# Patient Record
Sex: FEMALE | Race: WHITE | NOT HISPANIC OR LATINO | Employment: FULL TIME | URBAN - NONMETROPOLITAN AREA
[De-identification: names, ages, dates, MRNs, and addresses within clinical notes are randomized per-mention and may not be internally consistent; named-entity substitution may affect disease eponyms.]

---

## 2017-07-11 ENCOUNTER — OFFICE VISIT (OUTPATIENT)
Dept: FAMILY MEDICINE | Facility: CLINIC | Age: 27
End: 2017-07-11
Payer: COMMERCIAL

## 2017-07-11 VITALS
HEIGHT: 67 IN | BODY MASS INDEX: 20.56 KG/M2 | RESPIRATION RATE: 18 BRPM | DIASTOLIC BLOOD PRESSURE: 66 MMHG | TEMPERATURE: 97.7 F | SYSTOLIC BLOOD PRESSURE: 117 MMHG | HEART RATE: 80 BPM | WEIGHT: 131 LBS

## 2017-07-11 DIAGNOSIS — N76.0 BACTERIAL VAGINITIS: ICD-10-CM

## 2017-07-11 DIAGNOSIS — N89.8 VAGINAL DISCHARGE: Primary | ICD-10-CM

## 2017-07-11 DIAGNOSIS — B96.89 BACTERIAL VAGINITIS: ICD-10-CM

## 2017-07-11 LAB
MICRO REPORT STATUS: ABNORMAL
SPECIMEN SOURCE: ABNORMAL
WET PREP SPEC: ABNORMAL

## 2017-07-11 PROCEDURE — 87491 CHLMYD TRACH DNA AMP PROBE: CPT | Performed by: FAMILY MEDICINE

## 2017-07-11 PROCEDURE — 99213 OFFICE O/P EST LOW 20 MIN: CPT | Performed by: FAMILY MEDICINE

## 2017-07-11 PROCEDURE — 87210 SMEAR WET MOUNT SALINE/INK: CPT | Performed by: FAMILY MEDICINE

## 2017-07-11 PROCEDURE — 87591 N.GONORRHOEAE DNA AMP PROB: CPT | Performed by: FAMILY MEDICINE

## 2017-07-11 RX ORDER — METRONIDAZOLE 500 MG/1
500 TABLET ORAL 2 TIMES DAILY
Qty: 14 TABLET | Refills: 0 | Status: SHIPPED | OUTPATIENT
Start: 2017-07-11 | End: 2017-07-17

## 2017-07-11 NOTE — LETTER
[address] [address]  [phone]        July 13, 2017    Jackie Quispe  [address]  [facility] [place] [address]            Dear Jackie,    The results of you recent STD screening for chlamydia and gonorrhoeae were all normal.    If you have any questions or concerns, please call me or my nurse at [phone].    Sincerely,        Tru Savage MD

## 2017-07-11 NOTE — NURSING NOTE
"Chief Complaint   Patient presents with     Vaginal Problem       Initial /66 (Cuff Size: Adult Regular)  Pulse 80  Temp 97.7  F (36.5  C) (Tympanic)  Resp 18  Ht 5' 6.5\" (1.689 m)  Wt 131 lb (59.4 kg)  Breastfeeding? No  BMI 20.83 kg/m2 Estimated body mass index is 20.83 kg/(m^2) as calculated from the following:    Height as of this encounter: 5' 6.5\" (1.689 m).    Weight as of this encounter: 131 lb (59.4 kg).  Medication Reconciliation: complete    Health Maintenance that is potentially due pending provider review:  NONE    n/a    "

## 2017-07-11 NOTE — PATIENT INSTRUCTIONS
Bacterial Vaginosis    You have a vaginal infection called bacterial vaginosis (BV). Both good and bad bacteria are present in a healthy vagina. BV occurs when these bacteria get out of balance. The number of bad bacteria increase. And the number of good bacteria decrease.  BV may or may not cause symptoms. If symptoms do occur, they can include:    Thin, gray, milky-white, or sometimes green discharge    Unpleasant odor or  fishy  smell    Itching, burning, or pain in or around the vagina  It is not known what causes BV, but certain factors can make the problem more likely. This can include:    Douching    Having sex with a new partner    Having sex with more than one partner  BV will sometimes go away on its own. But treatment is usually recommended. This is because untreated BV can increase the risk of more serious health problems such as:    Pelvic inflammatory disease (PID)     delivery (giving birth to a baby early if you re pregnant)    HIV and certain other sexually transmitted diseases (STDs)    Infection after surgery on the reproductive organs  Home care  General care    BV is most often treated with medicines called antibiotics. These may be given as pills or as a vaginal cream. If antibiotics are prescribed, be sure to use them exactly as directed. Also, be sure to complete all of the medicine, even if your symptoms go away.    Avoid douching or having sex during treatment.    If you have sex with a female partner, ask your healthcare provider if she should also be treated.  Prevention    Limit or avoid douching.    Avoid having sex. If you do have sex, then take steps to lower your risk:    Use condoms when having sex.    Limit the number of partners you have sex with.  Follow-up care  Follow up with your healthcare provider, or as advised.  When to seek medical advice  Call your healthcare provider right away if:    You have a fever of 100.4 F (38 C) or higher, or as directed by your  provider.    Your symptoms worsen, or they don t go away within a few days of starting treatment.    You have new pain in the lower belly or pelvic region.    You have side effects that bother you or a reaction to the pills or cream you re prescribed.    You or any partners you have sex with have new symptoms, such as a rash, joint pain, or sores.  Date Last Reviewed: 7/30/2015 2000-2017 [employer]. [address] [place] [address], [place] [address]. All rights reserved. This information is not intended as a substitute for professional medical care. Always follow your healthcare professional's instructions.          Vaginal Infection: Bacterial Vaginosis  Both good and bad bacteria are present in a healthy vagina. Bacterial vaginosis (BV) occurs when these bacteria get out of balance. The numbers of good bacteria decrease. This allows the numbers of bad bacteria to increase and cause BV. In most cases, BV is not a serious problem. This sheet tells you more about BV and how it can be treated.  Causes of Bacterial Vaginosis  The cause of BV is not clear. Douching may lead to it. Having sex with a new partner or more than 1 partner makes it more likely.  Symptoms of Bacterial Vaginosis  Symptoms of BV vary for each woman. Some women have few symptoms or none at all. If symptoms are present, they can include:    Thin, milky white or gray discharge    Unpleasant  fishy  odor    Irritation, itching, and burning at opening of vagina.    Burning or irritation with sex or urination  Diagnosing Bacterial Vaginosis  Your health care provider will ask about your symptoms and health history. He or she will also perform a pelvic exam. This is an exam of your vagina and cervix. A sample of vaginal fluid or discharge may be taken. This sample is checked for signs of BV.  Treating Bacterial Vaginosis  BV is often treated with antibiotics. They may be given in oral pill form or as a vaginal cream. To use these  medications:    Be sure to take all of your medication, even if your symptoms go away.    If you re taking antibiotic pills, do not drink alcohol until you re finished with all of your medication.    If you re using vaginal cream, apply it as directed. Be aware that the cream may make condoms and diaphragms less effective.    Call your health care provider if symptoms do not go away within 4 days of starting treatment. Also call if you have a reaction to the medication.  Why Treatment Matters  Even if you have no symptoms or your symptoms are mild, BV should be treated. Untreated BV can lead to health problems such as:    Increased risk of  delivery if you re pregnant.    Increased risk of complications after surgery on the reproductive organs.    Possible increased risk of pelvic inflammatory disease (PID).     8031-3943 [employer] [address] [place] [address]. All rights reserved. This information is not intended as a substitute for professional medical care. Always follow your healthcare professional's instructions.        Patient Education    Butylparaben, Cetyl Alcohol, Methylparaben, Propylene Glycol, Propylparaben, Sodium Lauryl Sulfate, Stearyl Alcohol, Water Topical emulsion, Metronidazole Topical gel    Metronidazole Oral capsule    Metronidazole Oral tablet    Metronidazole Oral tablet, extended-release    Metronidazole Solution for injection    Metronidazole Topical cream    Metronidazole Topical gel    Metronidazole Topical lotion    Metronidazole Vaginal gel  Metronidazole Oral tablet  What is this medicine?  METRONIDAZOLE (me troe NI da zole) is an antiinfective. It is used to treat certain kinds of bacterial and protozoal infections. It will not work for colds, flu, or other viral infections.  This medicine may be used for other purposes; ask your health care provider or pharmacist if you have questions.  What should I tell my health care provider before I take  this medicine?  They need to know if you have any of these conditions:    anemia or other blood disorders    disease of the nervous system    fungal or yeast infection    if you drink alcohol containing drinks    liver disease    seizures    an unusual or allergic reaction to metronidazole, or other medicines, foods, dyes, or preservatives    pregnant or trying to get pregnant    breast-feeding  How should I use this medicine?  Take this medicine by mouth with a full glass of water. Follow the directions on the prescription label. Take your medicine at regular intervals. Do not take your medicine more often than directed. Take all of your medicine as directed even if you think you are better. Do not skip doses or stop your medicine early.  Talk to your pediatrician regarding the use of this medicine in children. Special care may be needed.  Overdosage: If you think you have taken too much of this medicine contact a poison control center or emergency room at once.  NOTE: This medicine is only for you. Do not share this medicine with others.  What if I miss a dose?  If you miss a dose, take it as soon as you can. If it is almost time for your next dose, take only that dose. Do not take double or extra doses.  What may interact with this medicine?  Do not take this medicine with any of the following medications:    alcohol or any product that contains alcohol    amprenavir oral solution    cisapride    disulfiram    dofetilide    dronedarone    paclitaxel injection    pimozide    ritonavir oral solution    sertraline oral solution    sulfamethoxazole-trimethoprim injection    thioridazine    ziprasidone  This medicine may also interact with the following medications:    birth control pills    cimetidine    lithium    other medicines that prolong the QT interval (cause an abnormal heart rhythm)    phenobarbital    phenytoin    warfarin  This list may not describe all possible interactions. Give your health care provider  a list of all the medicines, herbs, non-prescription drugs, or dietary supplements you use. Also tell them if you smoke, drink alcohol, or use illegal drugs. Some items may interact with your medicine.  What should I watch for while using this medicine?  Tell your doctor or health care professional if your symptoms do not improve or if they get worse.  You may get drowsy or dizzy. Do not drive, use machinery, or do anything that needs mental alertness until you know how this medicine affects you. Do not stand or sit up quickly, especially if you are an older patient. This reduces the risk of dizzy or fainting spells.  Avoid alcoholic drinks while you are taking this medicine and for three days afterward. Alcohol may make you feel dizzy, sick, or flushed.  If you are being treated for a sexually transmitted disease, avoid sexual contact until you have finished your treatment. Your sexual partner may also need treatment.  What side effects may I notice from receiving this medicine?  Side effects that you should report to your doctor or health care professional as soon as possible:    allergic reactions like skin rash or hives, swelling of the face, lips, or tongue    confusion, clumsiness    difficulty speaking    discolored or sore mouth    dizziness    fever, infection    numbness, tingling, pain or weakness in the hands or feet    trouble passing urine or change in the amount of urine    redness, blistering, peeling or loosening of the skin, including inside the mouth    seizures    unusually weak or tired    vaginal irritation, dryness, or discharge  Side effects that usually do not require medical attention (report to your doctor or health care professional if they continue or are bothersome):    diarrhea    headache    irritability    metallic taste    nausea    stomach pain or cramps    trouble sleeping  This list may not describe all possible side effects. Call your doctor for medical advice about side  effects. You may report side effects to FDA at 1-946-RZG-7276.  Where should I keep my medicine?  Keep out of the reach of children.  Store at room temperature below 25 degrees C (77 degrees F). Protect from light. Keep container tightly closed. Throw away any unused medicine after the expiration date.  NOTE:This sheet is a summary. It may not cover all possible information. If you have questions about this medicine, talk to your doctor, pharmacist, or health care provider. Copyright  2016 [employer]

## 2017-07-11 NOTE — MR AVS SNAPSHOT
After Visit Summary   2017    Jackie Quispe    MRN: [id number]           Patient Information     Date Of Birth          1990        Visit Information        Provider Department      2017 12:00 PM Tru Savage MD [facility]        Today's Diagnoses     Vaginal discharge    -  1    Bacterial vaginitis          Care Instructions      Bacterial Vaginosis    You have a vaginal infection called bacterial vaginosis (BV). Both good and bad bacteria are present in a healthy vagina. BV occurs when these bacteria get out of balance. The number of bad bacteria increase. And the number of good bacteria decrease.  BV may or may not cause symptoms. If symptoms do occur, they can include:    Thin, gray, milky-white, or sometimes green discharge    Unpleasant odor or  fishy  smell    Itching, burning, or pain in or around the vagina  It is not known what causes BV, but certain factors can make the problem more likely. This can include:    Douching    Having sex with a new partner    Having sex with more than one partner  BV will sometimes go away on its own. But treatment is usually recommended. This is because untreated BV can increase the risk of more serious health problems such as:    Pelvic inflammatory disease (PID)     delivery (giving birth to a baby early if you re pregnant)    HIV and certain other sexually transmitted diseases (STDs)    Infection after surgery on the reproductive organs  Home care  General care    BV is most often treated with medicines called antibiotics. These may be given as pills or as a vaginal cream. If antibiotics are prescribed, be sure to use them exactly as directed. Also, be sure to complete all of the medicine, even if your symptoms go away.    Avoid douching or having sex during treatment.    If you have sex with a female partner, ask your healthcare provider if she should also be treated.  Prevention    Limit or avoid  douching.    Avoid having sex. If you do have sex, then take steps to lower your risk:    Use condoms when having sex.    Limit the number of partners you have sex with.  Follow-up care  Follow up with your healthcare provider, or as advised.  When to seek medical advice  Call your healthcare provider right away if:    You have a fever of 100.4 F (38 C) or higher, or as directed by your provider.    Your symptoms worsen, or they don t go away within a few days of starting treatment.    You have new pain in the lower belly or pelvic region.    You have side effects that bother you or a reaction to the pills or cream you re prescribed.    You or any partners you have sex with have new symptoms, such as a rash, joint pain, or sores.  Date Last Reviewed: 7/30/2015 2000-2017 [employer]. [address] [place] [address]. All rights reserved. This information is not intended as a substitute for professional medical care. Always follow your healthcare professional's instructions.          Vaginal Infection: Bacterial Vaginosis  Both good and bad bacteria are present in a healthy vagina. Bacterial vaginosis (BV) occurs when these bacteria get out of balance. The numbers of good bacteria decrease. This allows the numbers of bad bacteria to increase and cause BV. In most cases, BV is not a serious problem. This sheet tells you more about BV and how it can be treated.  Causes of Bacterial Vaginosis  The cause of BV is not clear. Douching may lead to it. Having sex with a new partner or more than 1 partner makes it more likely.  Symptoms of Bacterial Vaginosis  Symptoms of BV vary for each woman. Some women have few symptoms or none at all. If symptoms are present, they can include:    Thin, milky white or gray discharge    Unpleasant  fishy  odor    Irritation, itching, and burning at opening of vagina.    Burning or irritation with sex or urination  Diagnosing Bacterial Vaginosis  Your health care  provider will ask about your symptoms and health history. He or she will also perform a pelvic exam. This is an exam of your vagina and cervix. A sample of vaginal fluid or discharge may be taken. This sample is checked for signs of BV.  Treating Bacterial Vaginosis  BV is often treated with antibiotics. They may be given in oral pill form or as a vaginal cream. To use these medications:    Be sure to take all of your medication, even if your symptoms go away.    If you re taking antibiotic pills, do not drink alcohol until you re finished with all of your medication.    If you re using vaginal cream, apply it as directed. Be aware that the cream may make condoms and diaphragms less effective.    Call your health care provider if symptoms do not go away within 4 days of starting treatment. Also call if you have a reaction to the medication.  Why Treatment Matters  Even if you have no symptoms or your symptoms are mild, BV should be treated. Untreated BV can lead to health problems such as:    Increased risk of  delivery if you re pregnant.    Increased risk of complications after surgery on the reproductive organs.    Possible increased risk of pelvic inflammatory disease (PID).     1389-4667 [employer]. [address] [place] [address]. All rights reserved. This information is not intended as a substitute for professional medical care. Always follow your healthcare professional's instructions.        Patient Education    Butylparaben, Cetyl Alcohol, Methylparaben, Propylene Glycol, Propylparaben, Sodium Lauryl Sulfate, Stearyl Alcohol, Water Topical emulsion, Metronidazole Topical gel    Metronidazole Oral capsule    Metronidazole Oral tablet    Metronidazole Oral tablet, extended-release    Metronidazole Solution for injection    Metronidazole Topical cream    Metronidazole Topical gel    Metronidazole Topical lotion    Metronidazole Vaginal gel  Metronidazole Oral tablet  What is  this medicine?  METRONIDAZOLE (ankit manrique) is an antiinfective. It is used to treat certain kinds of bacterial and protozoal infections. It will not work for colds, flu, or other viral infections.  This medicine may be used for other purposes; ask your health care provider or pharmacist if you have questions.  What should I tell my health care provider before I take this medicine?  They need to know if you have any of these conditions:    anemia or other blood disorders    disease of the nervous system    fungal or yeast infection    if you drink alcohol containing drinks    liver disease    seizures    an unusual or allergic reaction to metronidazole, or other medicines, foods, dyes, or preservatives    pregnant or trying to get pregnant    breast-feeding  How should I use this medicine?  Take this medicine by mouth with a full glass of water. Follow the directions on the prescription label. Take your medicine at regular intervals. Do not take your medicine more often than directed. Take all of your medicine as directed even if you think you are better. Do not skip doses or stop your medicine early.  Talk to your pediatrician regarding the use of this medicine in children. Special care may be needed.  Overdosage: If you think you have taken too much of this medicine contact a poison control center or emergency room at once.  NOTE: This medicine is only for you. Do not share this medicine with others.  What if I miss a dose?  If you miss a dose, take it as soon as you can. If it is almost time for your next dose, take only that dose. Do not take double or extra doses.  What may interact with this medicine?  Do not take this medicine with any of the following medications:    alcohol or any product that contains alcohol    amprenavir oral solution    cisapride    disulfiram    dofetilide    dronedarone    paclitaxel injection    pimozide    ritonavir oral solution    sertraline oral  solution    sulfamethoxazole-trimethoprim injection    thioridazine    ziprasidone  This medicine may also interact with the following medications:    birth control pills    cimetidine    lithium    other medicines that prolong the QT interval (cause an abnormal heart rhythm)    phenobarbital    phenytoin    warfarin  This list may not describe all possible interactions. Give your health care provider a list of all the medicines, herbs, non-prescription drugs, or dietary supplements you use. Also tell them if you smoke, drink alcohol, or use illegal drugs. Some items may interact with your medicine.  What should I watch for while using this medicine?  Tell your doctor or health care professional if your symptoms do not improve or if they get worse.  You may get drowsy or dizzy. Do not drive, use machinery, or do anything that needs mental alertness until you know how this medicine affects you. Do not stand or sit up quickly, especially if you are an older patient. This reduces the risk of dizzy or fainting spells.  Avoid alcoholic drinks while you are taking this medicine and for three days afterward. Alcohol may make you feel dizzy, sick, or flushed.  If you are being treated for a sexually transmitted disease, avoid sexual contact until you have finished your treatment. Your sexual partner may also need treatment.  What side effects may I notice from receiving this medicine?  Side effects that you should report to your doctor or health care professional as soon as possible:    allergic reactions like skin rash or hives, swelling of the face, lips, or tongue    confusion, clumsiness    difficulty speaking    discolored or sore mouth    dizziness    fever, infection    numbness, tingling, pain or weakness in the hands or feet    trouble passing urine or change in the amount of urine    redness, blistering, peeling or loosening of the skin, including inside the mouth    seizures    unusually weak or tired    vaginal  irritation, dryness, or discharge  Side effects that usually do not require medical attention (report to your doctor or health care professional if they continue or are bothersome):    diarrhea    headache    irritability    metallic taste    nausea    stomach pain or cramps    trouble sleeping  This list may not describe all possible side effects. Call your doctor for medical advice about side effects. You may report side effects to FDA at 0-719-AME-8157.  Where should I keep my medicine?  Keep out of the reach of children.  Store at room temperature below 25 degrees C (77 degrees F). Protect from light. Keep container tightly closed. Throw away any unused medicine after the expiration date.  NOTE:This sheet is a summary. It may not cover all possible information. If you have questions about this medicine, talk to your doctor, pharmacist, or health care provider. Copyright  2016 [employer]                Follow-ups after your visit        Who to contact     If you have questions or need follow up information about today's clinic visit or your schedule please contact [facility] directly at [phone].  Normal or non-critical lab and imaging results will be communicated to you by [employer], letter or phone within 4 business days after the clinic has received the results. If you do not hear from us within 7 days, please contact the clinic through [employer] or phone. If you have a critical or abnormal lab result, we will notify you by phone as soon as possible.  Submit refill requests through [employer] or call your pharmacy and they will forward the refill request to us. Please allow 3 business days for your refill to be completed.          Additional Information About Your Visit        [employer] Information     [employer] gives you secure access to your electronic health record. If you see a primary care provider, you can also send messages to your care team and make appointments. If you have questions,  "please call your primary care clinic.  If you do not have a primary care provider, please call [phone] and they will assist you.        Care EveryWhere ID     This is your Care EveryWhere ID. This could be used by other organizations to access your [facility] medical records  [id number]        Your Vitals Were     Pulse Temperature Respirations Height Breastfeeding? BMI (Body Mass Index)    80 97.7  F (36.5  C) (Tympanic) 18 5' 6.5\" (1.689 m) No 20.83 kg/m2       Blood Pressure from Last 3 Encounters:   07/11/17 117/66   09/13/16 118/66   08/02/16 114/66    Weight from Last 3 Encounters:   07/11/17 131 lb (59.4 kg)   09/13/16 132 lb (59.9 kg)   08/02/16 130 lb 12.8 oz (59.3 kg)              We Performed the Following     Chlamydia trachomatis PCR     Neisseria gonorrhoeae PCR     Wet prep          Today's Medication Changes          These changes are accurate as of: 7/11/17 12:33 PM.  If you have any questions, ask your nurse or doctor.               Start taking these medicines.        Dose/Directions    metroNIDAZOLE 500 MG tablet   Commonly known as:  FLAGYL   Used for:  Bacterial vaginitis   Started by:  Tru Savage MD        Dose:  500 mg   Take 1 tablet (500 mg) by mouth 2 times daily   Quantity:  14 tablet   Refills:  0         Stop taking these medicines if you haven't already. Please contact your care team if you have questions.     estradiol 1 MG tablet   Commonly known as:  ESTRACE   Stopped by:  Tru Savage MD           nicotine 14 MG/24HR 24 hr patch   Commonly known as:  NICODERM CQ   Stopped by:  Tru Savage MD           nicotine 21 MG/24HR 24 hr patch   Commonly known as:  NICODERM CQ   Stopped by:  Tru Savage MD           nicotine 7 MG/24HR 24 hr patch   Commonly known as:  NICODERM CQ   Stopped by:  Tru Savage MD                Where to get your medicines      [facility] [address] [place] [address] - [address]  [address] " , [facility] [address]     Phone:  [phone]     metroNIDAZOLE 500 MG tablet                Primary Care Provider Office Phone # Fax #    Tammy Beth [phone]       [facility] [address]  [facility] [address]        Equal Access to Services     DILMA QUINONES : Hadii aad ku hadasho Soomaali, waaxda luqadaha, qaybta kaalmada adeegyada, waxay idiin hayaan ademarino vargasdomingojazmin laamandeep lee. So [facility] [phone].    ATENCIÓN: Si habla español, tiene a dang disposición servicios gratuitos de asistencia lingüística. [facility] [phone].    We comply with applicable federal civil rights laws and [place] laws. We do not discriminate on the basis of race, color, national origin, age, disability sex, sexual orientation or gender identity.            Thank you!     Thank you for choosing [facility]  for your care. Our goal is always to provide you with excellent care. Hearing back from our patients is one way we can continue to improve our services. Please take a few minutes to complete the written survey that you may receive in the mail after your visit with us. Thank you!             Your Updated Medication List - Protect others around you: Learn how to safely use, store and throw away your medicines at www.disposemymeds.org.          This list is accurate as of: 7/11/17 12:33 PM.  Always use your most recent med list.                   Brand Name Dispense Instructions for use Diagnosis    levonorgestrel 20 MCG/24HR IUD    MIRENA    1 each    1 each (20 mcg) by Intrauterine route once 7/8/2016        metroNIDAZOLE 500 MG tablet    FLAGYL    14 tablet    Take 1 tablet (500 mg) by mouth 2 times daily    Bacterial vaginitis

## 2017-07-11 NOTE — PROGRESS NOTES
SUBJECTIVE:                                                    Jackie Quispe is a 27 year old female who presents to clinic today for the following health issues:      Vaginal Symptoms      Duration: about a month    Description  vaginal discharge - creamy and pelvic pain    Intensity:  moderate    Accompanying signs and symptoms (fever/dysuria/abdominal or back pain): None    History  Sexually active: yes, single partner, contraception - IUD  Possibility of pregnancy: No  Recent antibiotic use: no     Precipitating or alleviating factors: None    Therapies tried and outcome: none   Outcome:     LMP 06/05/2017, sexually active, no history of STD      Problem list and histories reviewed & adjusted, as indicated.  Additional history: as documented    Patient Active Problem List   Diagnosis     Smoker     Acne      mirena IUD     Dysfunctional uterine bleeding     Past Surgical History:   Procedure Laterality Date     MOUTH SURGERY      wisdom teeth       Social History   Substance Use Topics     Smoking status: Current Every Day Smoker     Packs/day: 0.50     Types: Cigarettes     Smokeless tobacco: Never Used      Comment: smoking  10 cig/day     Alcohol use Yes      Comment: Occas- quit with pregnacny     Family History   Problem Relation Age of Onset     Alcohol/Drug Maternal Grandmother      alcohol     Hypertension Paternal Grandfather      CANCER Paternal Grandfather      HEART DISEASE Paternal Grandfather      CANCER Maternal Grandfather      lung     Alzheimer Disease Paternal Grandmother      Family History Negative Mother      Cardiovascular Father 54     MI     HEART DISEASE Father          Current Outpatient Prescriptions   Medication Sig Dispense Refill     levonorgestrel (MIRENA) 20 MCG/24HR IUD 1 each (20 mcg) by Intrauterine route once 7/8/2016 1 each 0     No Known Allergies  Recent Labs   Lab Test  09/13/16   1750   ALT  17   CR  0.61   GFRESTIMATED  >90  Non  GFR Calc    "  GFRESTBLACK  >90   GFR Calc     POTASSIUM  3.9   TSH  1.22      BP Readings from Last 3 Encounters:   07/11/17 117/66   09/13/16 118/66   08/02/16 114/66    Wt Readings from Last 3 Encounters:   07/11/17 131 lb (59.4 kg)   09/13/16 132 lb (59.9 kg)   08/02/16 130 lb 12.8 oz (59.3 kg)                  Labs reviewed in EPIC    Reviewed and updated as needed this visit by clinical staff       Reviewed and updated as needed this visit by Provider         ROS:  Constitutional, HEENT, cardiovascular, pulmonary, gi and gu systems are negative, except as otherwise noted.    OBJECTIVE:     /66 (Cuff Size: Adult Regular)  Pulse 80  Temp 97.7  F (36.5  C) (Tympanic)  Resp 18  Ht 5' 6.5\" (1.689 m)  Wt 131 lb (59.4 kg)  Breastfeeding? No  BMI 20.83 kg/m2  Body mass index is 20.83 kg/(m^2).  GENERAL: healthy, alert and no distress  NECK: no adenopathy, no asymmetry, masses, or scars and thyroid normal to palpation  RESP: lungs clear to auscultation - no rales, rhonchi or wheezes  CV: regular rate and rhythm, normal S1 S2, no S3 or S4, no murmur, click or rub, no peripheral edema and peripheral pulses strong  ABDOMEN: soft, nontender, no hepatosplenomegaly, no masses and bowel sounds normal  MS: no gross musculoskeletal defects noted, no edema    Diagnostic Test Results:  Results for orders placed or performed in visit on 07/11/17 (from the past 24 hour(s))   Wet prep   Result Value Ref Range    Specimen Description Vagina     Wet Prep (A)      No Trichomonas seen  No yeast seen  Many Clue cells seen      Micro Report Status FINAL 07/11/2017        ASSESSMENT/PLAN:         ICD-10-CM    1. Vaginal discharge N89.8 Wet prep     Chlamydia trachomatis PCR     Neisseria gonorrhoeae PCR   2. Bacterial vaginitis N76.0 metroNIDAZOLE (FLAGYL) 500 MG tablet    B96.89        Symptoms likely secondary to bacterial vaginitis. Flagyl prescribed, common side effect discussed. Shared decision was made to do STD " screening as well. Written information provided. All questions answered.      MEDICATIONS:   Orders Placed This Encounter   Medications     metroNIDAZOLE (FLAGYL) 500 MG tablet     Sig: Take 1 tablet (500 mg) by mouth 2 times daily     Dispense:  14 tablet     Refill:  0       Patient Instructions       Bacterial Vaginosis    You have a vaginal infection called bacterial vaginosis (BV). Both good and bad bacteria are present in a healthy vagina. BV occurs when these bacteria get out of balance. The number of bad bacteria increase. And the number of good bacteria decrease.  BV may or may not cause symptoms. If symptoms do occur, they can include:    Thin, gray, milky-white, or sometimes green discharge    Unpleasant odor or  fishy  smell    Itching, burning, or pain in or around the vagina  It is not known what causes BV, but certain factors can make the problem more likely. This can include:    Douching    Having sex with a new partner    Having sex with more than one partner  BV will sometimes go away on its own. But treatment is usually recommended. This is because untreated BV can increase the risk of more serious health problems such as:    Pelvic inflammatory disease (PID)     delivery (giving birth to a baby early if you re pregnant)    HIV and certain other sexually transmitted diseases (STDs)    Infection after surgery on the reproductive organs  Home care  General care    BV is most often treated with medicines called antibiotics. These may be given as pills or as a vaginal cream. If antibiotics are prescribed, be sure to use them exactly as directed. Also, be sure to complete all of the medicine, even if your symptoms go away.    Avoid douching or having sex during treatment.    If you have sex with a female partner, ask your healthcare provider if she should also be treated.  Prevention    Limit or avoid douching.    Avoid having sex. If you do have sex, then take steps to lower your risk:    Use  condoms when having sex.    Limit the number of partners you have sex with.  Follow-up care  Follow up with your healthcare provider, or as advised.  When to seek medical advice  Call your healthcare provider right away if:    You have a fever of 100.4 F (38 C) or higher, or as directed by your provider.    Your symptoms worsen, or they don t go away within a few days of starting treatment.    You have new pain in the lower belly or pelvic region.    You have side effects that bother you or a reaction to the pills or cream you re prescribed.    You or any partners you have sex with have new symptoms, such as a rash, joint pain, or sores.  Date Last Reviewed: 7/30/2015 2000-2017 [employer]. [address] [place] [address], [place] [address]. All rights reserved. This information is not intended as a substitute for professional medical care. Always follow your healthcare professional's instructions.          Vaginal Infection: Bacterial Vaginosis  Both good and bad bacteria are present in a healthy vagina. Bacterial vaginosis (BV) occurs when these bacteria get out of balance. The numbers of good bacteria decrease. This allows the numbers of bad bacteria to increase and cause BV. In most cases, BV is not a serious problem. This sheet tells you more about BV and how it can be treated.  Causes of Bacterial Vaginosis  The cause of BV is not clear. Douching may lead to it. Having sex with a new partner or more than 1 partner makes it more likely.  Symptoms of Bacterial Vaginosis  Symptoms of BV vary for each woman. Some women have few symptoms or none at all. If symptoms are present, they can include:    Thin, milky white or gray discharge    Unpleasant  fishy  odor    Irritation, itching, and burning at opening of vagina.    Burning or irritation with sex or urination  Diagnosing Bacterial Vaginosis  Your health care provider will ask about your symptoms and health history. He or she will also perform a pelvic  exam. This is an exam of your vagina and cervix. A sample of vaginal fluid or discharge may be taken. This sample is checked for signs of BV.  Treating Bacterial Vaginosis  BV is often treated with antibiotics. They may be given in oral pill form or as a vaginal cream. To use these medications:    Be sure to take all of your medication, even if your symptoms go away.    If you re taking antibiotic pills, do not drink alcohol until you re finished with all of your medication.    If you re using vaginal cream, apply it as directed. Be aware that the cream may make condoms and diaphragms less effective.    Call your health care provider if symptoms do not go away within 4 days of starting treatment. Also call if you have a reaction to the medication.  Why Treatment Matters  Even if you have no symptoms or your symptoms are mild, BV should be treated. Untreated BV can lead to health problems such as:    Increased risk of  delivery if you re pregnant.    Increased risk of complications after surgery on the reproductive organs.    Possible increased risk of pelvic inflammatory disease (PID).     2950-4501 [employer]. [address] [place] [address]. All rights reserved. This information is not intended as a substitute for professional medical care. Always follow your healthcare professional's instructions.        Patient Education    Butylparaben, Cetyl Alcohol, Methylparaben, Propylene Glycol, Propylparaben, Sodium Lauryl Sulfate, Stearyl Alcohol, Water Topical emulsion, Metronidazole Topical gel    Metronidazole Oral capsule    Metronidazole Oral tablet    Metronidazole Oral tablet, extended-release    Metronidazole Solution for injection    Metronidazole Topical cream    Metronidazole Topical gel    Metronidazole Topical lotion    Metronidazole Vaginal gel  Metronidazole Oral tablet  What is this medicine?  METRONIDAZOLE (me amilcare NI da zole) is an antiinfective. It is used to treat  certain kinds of bacterial and protozoal infections. It will not work for colds, flu, or other viral infections.  This medicine may be used for other purposes; ask your health care provider or pharmacist if you have questions.  What should I tell my health care provider before I take this medicine?  They need to know if you have any of these conditions:    anemia or other blood disorders    disease of the nervous system    fungal or yeast infection    if you drink alcohol containing drinks    liver disease    seizures    an unusual or allergic reaction to metronidazole, or other medicines, foods, dyes, or preservatives    pregnant or trying to get pregnant    breast-feeding  How should I use this medicine?  Take this medicine by mouth with a full glass of water. Follow the directions on the prescription label. Take your medicine at regular intervals. Do not take your medicine more often than directed. Take all of your medicine as directed even if you think you are better. Do not skip doses or stop your medicine early.  Talk to your pediatrician regarding the use of this medicine in children. Special care may be needed.  Overdosage: If you think you have taken too much of this medicine contact a poison control center or emergency room at once.  NOTE: This medicine is only for you. Do not share this medicine with others.  What if I miss a dose?  If you miss a dose, take it as soon as you can. If it is almost time for your next dose, take only that dose. Do not take double or extra doses.  What may interact with this medicine?  Do not take this medicine with any of the following medications:    alcohol or any product that contains alcohol    amprenavir oral solution    cisapride    disulfiram    dofetilide    dronedarone    paclitaxel injection    pimozide    ritonavir oral solution    sertraline oral solution    sulfamethoxazole-trimethoprim injection    thioridazine    ziprasidone  This medicine may also interact  with the following medications:    birth control pills    cimetidine    lithium    other medicines that prolong the QT interval (cause an abnormal heart rhythm)    phenobarbital    phenytoin    warfarin  This list may not describe all possible interactions. Give your health care provider a list of all the medicines, herbs, non-prescription drugs, or dietary supplements you use. Also tell them if you smoke, drink alcohol, or use illegal drugs. Some items may interact with your medicine.  What should I watch for while using this medicine?  Tell your doctor or health care professional if your symptoms do not improve or if they get worse.  You may get drowsy or dizzy. Do not drive, use machinery, or do anything that needs mental alertness until you know how this medicine affects you. Do not stand or sit up quickly, especially if you are an older patient. This reduces the risk of dizzy or fainting spells.  Avoid alcoholic drinks while you are taking this medicine and for three days afterward. Alcohol may make you feel dizzy, sick, or flushed.  If you are being treated for a sexually transmitted disease, avoid sexual contact until you have finished your treatment. Your sexual partner may also need treatment.  What side effects may I notice from receiving this medicine?  Side effects that you should report to your doctor or health care professional as soon as possible:    allergic reactions like skin rash or hives, swelling of the face, lips, or tongue    confusion, clumsiness    difficulty speaking    discolored or sore mouth    dizziness    fever, infection    numbness, tingling, pain or weakness in the hands or feet    trouble passing urine or change in the amount of urine    redness, blistering, peeling or loosening of the skin, including inside the mouth    seizures    unusually weak or tired    vaginal irritation, dryness, or discharge  Side effects that usually do not require medical attention (report to your doctor  or health care professional if they continue or are bothersome):    diarrhea    headache    irritability    metallic taste    nausea    stomach pain or cramps    trouble sleeping  This list may not describe all possible side effects. Call your doctor for medical advice about side effects. You may report side effects to FDA at 3-003-YYB-3683.  Where should I keep my medicine?  Keep out of the reach of children.  Store at room temperature below 25 degrees C (77 degrees F). Protect from light. Keep container tightly closed. Throw away any unused medicine after the expiration date.  NOTE:This sheet is a summary. It may not cover all possible information. If you have questions about this medicine, talk to your doctor, pharmacist, or health care provider. Copyright  2016 [employer]            Tru Savage MD  [facility]

## 2017-07-12 LAB
C TRACH DNA SPEC QL NAA+PROBE: NORMAL
N GONORRHOEA DNA SPEC QL NAA+PROBE: NORMAL
SPECIMEN SOURCE: NORMAL
SPECIMEN SOURCE: NORMAL

## 2017-07-17 ENCOUNTER — OFFICE VISIT (OUTPATIENT)
Dept: FAMILY MEDICINE | Facility: CLINIC | Age: 27
End: 2017-07-17
Payer: COMMERCIAL

## 2017-07-17 VITALS
OXYGEN SATURATION: 99 % | BODY MASS INDEX: 20.67 KG/M2 | HEART RATE: 47 BPM | TEMPERATURE: 98.3 F | WEIGHT: 130 LBS | RESPIRATION RATE: 18 BRPM | SYSTOLIC BLOOD PRESSURE: 108 MMHG | DIASTOLIC BLOOD PRESSURE: 72 MMHG

## 2017-07-17 DIAGNOSIS — J02.0 ACUTE STREPTOCOCCAL PHARYNGITIS: ICD-10-CM

## 2017-07-17 DIAGNOSIS — R07.0 THROAT PAIN: Primary | ICD-10-CM

## 2017-07-17 LAB
DEPRECATED S PYO AG THROAT QL EIA: ABNORMAL
MICRO REPORT STATUS: ABNORMAL
SPECIMEN SOURCE: ABNORMAL

## 2017-07-17 PROCEDURE — 99213 OFFICE O/P EST LOW 20 MIN: CPT | Performed by: NURSE PRACTITIONER

## 2017-07-17 PROCEDURE — 87880 STREP A ASSAY W/OPTIC: CPT | Performed by: NURSE PRACTITIONER

## 2017-07-17 RX ORDER — PENICILLIN V POTASSIUM 500 MG/1
500 TABLET, FILM COATED ORAL 2 TIMES DAILY
Qty: 20 TABLET | Refills: 0 | Status: SHIPPED | OUTPATIENT
Start: 2017-07-17 | End: 2017-11-03

## 2017-07-17 NOTE — PROGRESS NOTES
SUBJECTIVE:                                                    Jackie Quispe is a 27 year old female who presents to clinic today for the following health issues:      ENT Symptoms             Symptoms: cc Present Absent Comment   Fever/Chills   x    Fatigue   x    Muscle Aches   x    Eye Irritation   x    Sneezing   x    Nasal Dean/Drg   x    Sinus Pressure/Pain   x    Loss of smell   x    Dental pain   x    Sore Throat  x     Swollen Glands  x     Ear Pain/Fullness  x     Cough  x     Wheeze   x    Chest Pain   x    Shortness of breath   x    Rash   x    Other         Symptom duration:  2 -3 days   Symptom severity:  Sore Throat   Treatments tried:  Tylenol, OTC cold and cough medication   Contacts:  Daughter has some symptoms of URI         Problem list and histories reviewed & adjusted, as indicated.  Additional history: as documented    Patient Active Problem List   Diagnosis     Smoker     Acne      mirena IUD     Dysfunctional uterine bleeding     Past Surgical History:   Procedure Laterality Date     MOUTH SURGERY      wisdom teeth       Social History   Substance Use Topics     Smoking status: Current Every Day Smoker     Packs/day: 0.50     Types: Cigarettes     Smokeless tobacco: Never Used      Comment: smoking  10 cig/day     Alcohol use Yes      Comment: Occas- quit with pregnacny     Family History   Problem Relation Age of Onset     Alcohol/Drug Maternal Grandmother      alcohol     Hypertension Paternal Grandfather      CANCER Paternal Grandfather      HEART DISEASE Paternal Grandfather      CANCER Maternal Grandfather      lung     Alzheimer Disease Paternal Grandmother      Family History Negative Mother      Cardiovascular Father 54     MI     HEART DISEASE Father            Reviewed and updated as needed this visit by clinical staff  Tobacco  Allergies  Med Hx  Surg Hx  Fam Hx  Soc Hx      Reviewed and updated as needed this visit by Provider         ROS:  Constitutional, HEENT,  cardiovascular, pulmonary, gi and gu systems are negative, except as otherwise noted.    OBJECTIVE:                                                    /72 (BP Location: Right arm, Patient Position: Chair, Cuff Size: Adult Regular)  Pulse (!) 47  Temp 98.3  F (36.8  C) (Tympanic)  Resp 18  Wt 130 lb (59 kg)  SpO2 99%  BMI 20.67 kg/m2  Body mass index is 20.67 kg/(m^2).  GENERAL APPEARANCE: healthy, alert and no distress  HENT: ear canals and TM's normal, nose and mouth without ulcers or lesions, tonsillar hypertrophy, tonsillar erythema and tonsillar exudate  NECK: no asymmetry, masses, or scars, thyroid normal to palpation and cervical adenopathy   RESP: lungs clear to auscultation - no rales, rhonchi or wheezes  CV: regular rates and rhythm, normal S1 S2, no S3 or S4 and no murmur, click or rub  ABDOMEN: soft, nontender, without hepatosplenomegaly or masses and bowel sounds normal  SKIN: no suspicious lesions or rashes  PSYCH: mentation appears normal and affect normal/bright    Diagnostic test results:  Diagnostic Test Results:  Results for orders placed or performed in visit on 07/17/17 (from the past 24 hour(s))   Strep, Rapid Screen   Result Value Ref Range    Specimen Description Throat     Rapid Strep A Screen (A)      POSITIVE: Group A Streptococcal antigen detected by immunoassay.    Micro Report Status FINAL 07/17/2017         ASSESSMENT/PLAN:                                                      Patient Instructions   1. Throat pain  - Strep, Rapid Screen    2. Acute streptococcal pharyngitis  - penicillin V potassium (VEETID) 500 MG tablet; Take 1 tablet (500 mg) by mouth 2 times daily  Dispense: 20 tablet; Refill: 0    Throw away tooth brush after treatment for 24 hours and then with completion of antibiotic   Symptomatic care as below         Pharyngitis: Strep (Confirmed)    You have had a positive test for strep throat. Strep throat is a contagious illness. It is spread by coughing,  kissing or by touching others after touching your mouth or nose. Symptoms include throat pain that is worse with swallowing, aching all over, headache, and fever. It is treated with antibiotic medicine. This should help you start to feel better in 1 to 2 days.  Home care    Rest at home. Drink plenty of fluids to you won't get dehydrated.    No work or school for the first 2 days of taking the antibiotics. After this time, you will not be contagious. You can then return to school or work if you are feeling better.     Take antibiotic medicine for the full 10 days, even if you feel better. This is very important to ensure the infection is treated. It is also important to prevent medicine-resistant germs from developing. If you were given an antibiotic shot, you don't need any more antibiotics.    You may use acetaminophen or ibuprofen to control pain or fever, unless another medicine was prescribed for this. Talk with your doctor before taking these medicines if you have chronic liver or kidney disease. Also talk with your doctor if you have had a stomach ulcer or GI bleeding.    Throat lozenges or sprays help reduce pain. Gargling with warm saltwater will also reduce throat pain. Dissolve 1/2 teaspoon of salt in 1 glass of warm water. This may be useful just before meals.     Soft foods are OK. Avoid salty or spicy foods.  Follow-up care  Follow up with your healthcare provider or our staff if you don't get better over the next week.  When to seek medical advice  Call your healthcare provider right away if any of these occur:    Fever of 100.4 F (38 C) or higher, or as directed by your healthcare provider    New or worsening ear pain, sinus pain, or headache    Painful lumps in the back of neck    Stiff neck    Lymph nodes getting larger or becoming soft in the middle    You can't swallow liquids or you can't open your mouth wide because of throat pain    Signs of dehydration. These include very dark urine or no  urine, sunken eyes, and dizziness.    Trouble breathing or noisy breathing    Muffled voice    Rash  Date Last Reviewed: 4/13/2015 2000-2017 [employer]. [address] [place] [address], [place] [address]. All rights reserved. This information is not intended as a substitute for professional medical care. Always follow your healthcare professional's instructions.            Ernestine Campos NP  [facility]

## 2017-07-17 NOTE — PATIENT INSTRUCTIONS
1. Throat pain  - Strep, Rapid Screen    2. Acute streptococcal pharyngitis  - penicillin V potassium (VEETID) 500 MG tablet; Take 1 tablet (500 mg) by mouth 2 times daily  Dispense: 20 tablet; Refill: 0    Throw away tooth brush after treatment for 24 hours and then with completion of antibiotic   Symptomatic care as below         Pharyngitis: Strep (Confirmed)    You have had a positive test for strep throat. Strep throat is a contagious illness. It is spread by coughing, kissing or by touching others after touching your mouth or nose. Symptoms include throat pain that is worse with swallowing, aching all over, headache, and fever. It is treated with antibiotic medicine. This should help you start to feel better in 1 to 2 days.  Home care    Rest at home. Drink plenty of fluids to you won't get dehydrated.    No work or school for the first 2 days of taking the antibiotics. After this time, you will not be contagious. You can then return to school or work if you are feeling better.     Take antibiotic medicine for the full 10 days, even if you feel better. This is very important to ensure the infection is treated. It is also important to prevent medicine-resistant germs from developing. If you were given an antibiotic shot, you don't need any more antibiotics.    You may use acetaminophen or ibuprofen to control pain or fever, unless another medicine was prescribed for this. Talk with your doctor before taking these medicines if you have chronic liver or kidney disease. Also talk with your doctor if you have had a stomach ulcer or GI bleeding.    Throat lozenges or sprays help reduce pain. Gargling with warm saltwater will also reduce throat pain. Dissolve 1/2 teaspoon of salt in 1 glass of warm water. This may be useful just before meals.     Soft foods are OK. Avoid salty or spicy foods.  Follow-up care  Follow up with your healthcare provider or our staff if you don't get better over the next week.  When to  seek medical advice  Call your healthcare provider right away if any of these occur:    Fever of 100.4 F (38 C) or higher, or as directed by your healthcare provider    New or worsening ear pain, sinus pain, or headache    Painful lumps in the back of neck    Stiff neck    Lymph nodes getting larger or becoming soft in the middle    You can't swallow liquids or you can't open your mouth wide because of throat pain    Signs of dehydration. These include very dark urine or no urine, sunken eyes, and dizziness.    Trouble breathing or noisy breathing    Muffled voice    Rash  Date Last Reviewed: 4/13/2015 2000-2017 [employer]. [address] [place] [address]. All rights reserved. This information is not intended as a substitute for professional medical care. Always follow your healthcare professional's instructions.

## 2017-07-17 NOTE — MR AVS SNAPSHOT
After Visit Summary   7/17/2017    Jackie Quispe    MRN: [id number]           Patient Information     Date Of Birth          1990        Visit Information        Provider Department      7/17/2017 8:40 AM Ernestine Campos NP [facility]        Today's Diagnoses     Throat pain    -  1    Acute streptococcal pharyngitis          Care Instructions    1. Throat pain  - Strep, Rapid Screen    2. Acute streptococcal pharyngitis  - penicillin V potassium (VEETID) 500 MG tablet; Take 1 tablet (500 mg) by mouth 2 times daily  Dispense: 20 tablet; Refill: 0    Throw away tooth brush after treatment for 24 hours and then with completion of antibiotic   Symptomatic care as below         Pharyngitis: Strep (Confirmed)    You have had a positive test for strep throat. Strep throat is a contagious illness. It is spread by coughing, kissing or by touching others after touching your mouth or nose. Symptoms include throat pain that is worse with swallowing, aching all over, headache, and fever. It is treated with antibiotic medicine. This should help you start to feel better in 1 to 2 days.  Home care    Rest at home. Drink plenty of fluids to you won't get dehydrated.    No work or school for the first 2 days of taking the antibiotics. After this time, you will not be contagious. You can then return to school or work if you are feeling better.     Take antibiotic medicine for the full 10 days, even if you feel better. This is very important to ensure the infection is treated. It is also important to prevent medicine-resistant germs from developing. If you were given an antibiotic shot, you don't need any more antibiotics.    You may use acetaminophen or ibuprofen to control pain or fever, unless another medicine was prescribed for this. Talk with your doctor before taking these medicines if you have chronic liver or kidney disease. Also talk with your doctor if you have had a stomach ulcer or GI  bleeding.    Throat lozenges or sprays help reduce pain. Gargling with warm saltwater will also reduce throat pain. Dissolve 1/2 teaspoon of salt in 1 glass of warm water. This may be useful just before meals.     Soft foods are OK. Avoid salty or spicy foods.  Follow-up care  Follow up with your healthcare provider or our staff if you don't get better over the next week.  When to seek medical advice  Call your healthcare provider right away if any of these occur:    Fever of 100.4 F (38 C) or higher, or as directed by your healthcare provider    New or worsening ear pain, sinus pain, or headache    Painful lumps in the back of neck    Stiff neck    Lymph nodes getting larger or becoming soft in the middle    You can't swallow liquids or you can't open your mouth wide because of throat pain    Signs of dehydration. These include very dark urine or no urine, sunken eyes, and dizziness.    Trouble breathing or noisy breathing    Muffled voice    Rash  Date Last Reviewed: 4/13/2015 2000-2017 The "[employer]. [address] [place] [address]. All rights reserved. This information is not intended as a substitute for professional medical care. Always follow your healthcare professional's instructions.                Follow-ups after your visit        Who to contact     If you have questions or need follow up information about today's clinic visit or your schedule please contact [facility] directly at [phone].  Normal or non-critical lab and imaging results will be communicated to you by MyChart, letter or phone within 4 business days after the clinic has received the results. If you do not hear from us within 7 days, please contact the clinic through [employer] or phone. If you have a critical or abnormal lab result, we will notify you by phone as soon as possible.  Submit refill requests through [employer] or call your pharmacy and they will forward the refill request to us. Please  allow 3 business days for your refill to be completed.          Additional Information About Your Visit        MyChart Information     [employer] gives you secure access to your electronic health record. If you see a primary care provider, you can also send messages to your care team and make appointments. If you have questions, please call your primary care clinic.  If you do not have a primary care provider, please call [phone] and they will assist you.        Care EveryWhere ID     This is your Care EveryWhere ID. This could be used by other organizations to access your [facility] medical records  [id number]        Your Vitals Were     Pulse Temperature Respirations Pulse Oximetry BMI (Body Mass Index)       47 98.3  F (36.8  C) (Tympanic) 18 99% 20.67 kg/m2        Blood Pressure from Last 3 Encounters:   07/17/17 108/72   07/11/17 117/66   09/13/16 118/66    Weight from Last 3 Encounters:   07/17/17 130 lb (59 kg)   07/11/17 131 lb (59.4 kg)   09/13/16 132 lb (59.9 kg)              We Performed the Following     Strep, Rapid Screen          Today's Medication Changes          These changes are accurate as of: 7/17/17  8:50 AM.  If you have any questions, ask your nurse or doctor.               Start taking these medicines.        Dose/Directions    penicillin V potassium 500 MG tablet   Commonly known as:  VEETID   Used for:  Acute streptococcal pharyngitis   Started by:  Ernestine Campos NP        Dose:  500 mg   Take 1 tablet (500 mg) by mouth 2 times daily   Quantity:  20 tablet   Refills:  0            Where to get your medicines      These medications were sent to [facility] [address] [place] [address] [address]  [address] [facility] [address]     Phone:  [phone]     penicillin V potassium 500 MG tablet                Primary Care Provider Office Phone # Fax #    Tammy Beth [phone]       [facility] [address] [facility] [address]         Equal Access to Services     [facility]: Hadii aad ku hadrobertoraven Vanesamontrell, waenzoda luqlauraha, qabrodie fernandaamilcarsunni pappas. So [facility] [phone].    ATENCIÓN: Si habla español, tiene a dang disposición servicios gratuitos de asistencia lingüística. Llame al [phone].    We comply with applicable federal civil rights laws and [place] laws. We do not discriminate on the basis of race, color, national origin, age, disability sex, sexual orientation or gender identity.            Thank you!     Thank you for choosing [facility]  for your care. Our goal is always to provide you with excellent care. Hearing back from our patients is one way we can continue to improve our services. Please take a few minutes to complete the written survey that you may receive in the mail after your visit with us. Thank you!             Your Updated Medication List - Protect others around you: Learn how to safely use, store and throw away your medicines at www.disposemymeds.org.          This list is accurate as of: 7/17/17  8:50 AM.  Always use your most recent med list.                   Brand Name Dispense Instructions for use Diagnosis    levonorgestrel 20 MCG/24HR IUD    MIRENA    1 each    1 each (20 mcg) by Intrauterine route once 7/8/2016        penicillin V potassium 500 MG tablet    VEETID    20 tablet    Take 1 tablet (500 mg) by mouth 2 times daily    Acute streptococcal pharyngitis

## 2017-07-17 NOTE — LETTER
[facility]  [address] [facility] [address]  Phone: [phone]  Fax: [phone]    July 17, 2017        Jackie Quispe  [address]  [facility] [address]          To whom it may concern:    RE: Jackie Quispe    Patient was seen and treated today at our clinic and missed work due to illness.     Please contact me for questions or concerns.      Sincerely,        Ernestine Campos NP

## 2017-07-17 NOTE — NURSING NOTE
"Chief Complaint   Patient presents with     URI       Initial /72 (BP Location: Right arm, Patient Position: Chair, Cuff Size: Adult Regular)  Pulse (!) 47  Temp 98.3  F (36.8  C) (Tympanic)  Resp 18  Wt 130 lb (59 kg)  SpO2 99%  BMI 20.67 kg/m2 Estimated body mass index is 20.67 kg/(m^2) as calculated from the following:    Height as of 7/11/17: 5' 6.5\" (1.689 m).    Weight as of this encounter: 130 lb (59 kg).  Medication Reconciliation: complete    Health Maintenance that is potentially due pending provider review:  NONE    n/a      "

## 2017-11-03 ENCOUNTER — OFFICE VISIT (OUTPATIENT)
Dept: FAMILY MEDICINE | Facility: CLINIC | Age: 27
End: 2017-11-03
Payer: COMMERCIAL

## 2017-11-03 VITALS
BODY MASS INDEX: 20.35 KG/M2 | OXYGEN SATURATION: 98 % | SYSTOLIC BLOOD PRESSURE: 108 MMHG | DIASTOLIC BLOOD PRESSURE: 62 MMHG | TEMPERATURE: 98 F | RESPIRATION RATE: 16 BRPM | HEART RATE: 102 BPM | WEIGHT: 128 LBS

## 2017-11-03 DIAGNOSIS — F41.1 GAD (GENERALIZED ANXIETY DISORDER): Primary | ICD-10-CM

## 2017-11-03 PROCEDURE — 99214 OFFICE O/P EST MOD 30 MIN: CPT | Performed by: NURSE PRACTITIONER

## 2017-11-03 RX ORDER — ESCITALOPRAM OXALATE 20 MG/1
TABLET ORAL
Qty: 30 TABLET | Refills: 1 | Status: SHIPPED | OUTPATIENT
Start: 2017-11-03 | End: 2017-12-18

## 2017-11-03 RX ORDER — HYDROXYZINE HYDROCHLORIDE 25 MG/1
25-50 TABLET, FILM COATED ORAL EVERY 6 HOURS PRN
Qty: 60 TABLET | Refills: 1 | Status: SHIPPED | OUTPATIENT
Start: 2017-11-03 | End: 2018-05-30

## 2017-11-03 ASSESSMENT — ANXIETY QUESTIONNAIRES
3. WORRYING TOO MUCH ABOUT DIFFERENT THINGS: NEARLY EVERY DAY
2. NOT BEING ABLE TO STOP OR CONTROL WORRYING: NEARLY EVERY DAY
5. BEING SO RESTLESS THAT IT IS HARD TO SIT STILL: NOT AT ALL
7. FEELING AFRAID AS IF SOMETHING AWFUL MIGHT HAPPEN: NOT AT ALL
6. BECOMING EASILY ANNOYED OR IRRITABLE: MORE THAN HALF THE DAYS
1. FEELING NERVOUS, ANXIOUS, OR ON EDGE: NEARLY EVERY DAY
GAD7 TOTAL SCORE: 13

## 2017-11-03 ASSESSMENT — PATIENT HEALTH QUESTIONNAIRE - PHQ9
SUM OF ALL RESPONSES TO PHQ QUESTIONS 1-9: 5
5. POOR APPETITE OR OVEREATING: MORE THAN HALF THE DAYS

## 2017-11-03 NOTE — PROGRESS NOTES
SUBJECTIVE:   Jackie Quispe is a 27 year old female who presents to clinic today for the following health issues:      Abnormal Mood Symptoms      Duration: Few Months    Description:  Depression: no   Anxiety: YES  Panic attacks: YES- not quite panic attacks     Accompanying signs and symptoms: see PHQ-9 and FIDELIA scores    History (similar episodes/previous evaluation): None    Precipitating or alleviating factors: Has some issues going on at home, moving    Therapies tried and outcome: Herbal supplements, helps a little bit but not improving symptoms    Family HX: Mom and sister both with anxiety, reports moms side of family has lots of depression and anxiety     No personal hx of past FIDELIA/depression   Having trouble with relationship issues, her and her boyfriend and seperating   They have 2 children together  Neither of them are happy anymore   They have been together for 6 years   She plans on moving out once she finds a place  Will take the kids every other day   Has been seeing Vivien Alcantara [place] has gone twice   Has tried couples counseling     TSH   Date Value Ref Range Status   09/13/2016 1.22 0.40 - 4.00 mU/L Final         Problem list and histories reviewed & adjusted, as indicated.  Additional history: as documented    Patient Active Problem List   Diagnosis     Smoker     Acne      mirena IUD     Dysfunctional uterine bleeding     Past Surgical History:   Procedure Laterality Date     MOUTH SURGERY      wisdom teeth       Social History   Substance Use Topics     Smoking status: Current Every Day Smoker     Packs/day: 0.50     Types: Cigarettes     Smokeless tobacco: Never Used      Comment: smoking  10 cig/day     Alcohol use Yes      Comment: Occas- quit with pregnacny     Family History   Problem Relation Age of Onset     Alcohol/Drug Maternal Grandmother      alcohol     Hypertension Paternal Grandfather      CANCER Paternal Grandfather      HEART DISEASE Paternal Grandfather      CANCER  Maternal Grandfather      lung     Alzheimer Disease Paternal Grandmother      Family History Negative Mother      Cardiovascular Father 54     MI     HEART DISEASE Father              Reviewed and updated as needed this visit by clinical staffTobacco  Allergies  Med Hx  Surg Hx  Fam Hx  Soc Hx      Reviewed and updated as needed this visit by Provider         ROS:  Constitutional, HEENT, cardiovascular, pulmonary, gi and gu systems are negative, except as otherwise noted.      OBJECTIVE:                                                    /62  Pulse 102  Temp 98  F (36.7  C) (Tympanic)  Resp 16  Wt 128 lb (58.1 kg)  SpO2 98%  BMI 20.35 kg/m2  Body mass index is 20.35 kg/(m^2).  GENERAL APPEARANCE: healthy, alert and no distress  PSYCH: tearful at times     Diagnostic test results:  Diagnostic Test Results:  none        ASSESSMENT/PLAN:                                                    FIDELIA (generalized anxiety disorder)    Continue counseling   Start lexapro- titration start as below   Hydroxyzine as needed for severe anxiety (do not drive until you know how you respond)  - escitalopram (LEXAPRO) 20 MG tablet; Take 1/2 tablet (10 mg) for 1-2 weeks, then increase to 1 tablet orally daily  Dispense: 30 tablet; Refill: 1  - hydrOXYzine (ATARAX) 25 MG tablet; Take 1-2 tablets (25-50 mg) by mouth every 6 hours as needed for anxiety  Dispense: 60 tablet; Refill: 1    Self care discussed- diet, exercise      See me back in 6 weeks for recheck  FYJAYLA due for physical and pap           Ernestine Campos NP  [facility]

## 2017-11-03 NOTE — PATIENT INSTRUCTIONS
FIDELIA (generalized anxiety disorder)  Continue counseling   Start lexapro- titration start as below   Hydroxyzine as needed for severe anxiety (do not drive until you know how you respond)  - escitalopram (LEXAPRO) 20 MG tablet; Take 1/2 tablet (10 mg) for 1-2 weeks, then increase to 1 tablet orally daily  Dispense: 30 tablet; Refill: 1  - hydrOXYzine (ATARAX) 25 MG tablet; Take 1-2 tablets (25-50 mg) by mouth every 6 hours as needed for anxiety  Dispense: 60 tablet; Refill: 1    Self care discussed     See me back in 6 weeks for recheck  FYI due for physical and pap

## 2017-11-03 NOTE — NURSING NOTE
"Chief Complaint   Patient presents with     Anxiety       Initial /62  Pulse 102  Temp 98  F (36.7  C) (Tympanic)  Resp 16  Wt 128 lb (58.1 kg)  SpO2 98%  BMI 20.35 kg/m2 Estimated body mass index is 20.35 kg/(m^2) as calculated from the following:    Height as of 7/11/17: 5' 6.5\" (1.689 m).    Weight as of this encounter: 128 lb (58.1 kg).  Medication Reconciliation: complete    Health Maintenance that is potentially due pending provider review:  Pap    Will schedule at a different time.    Is there anyone who you would like to be able to receive your results? No  If yes have patient fill out JESSICA      "

## 2017-11-03 NOTE — MR AVS SNAPSHOT
After Visit Summary   11/3/2017    Jackie Quispe    MRN: [id number]           Patient Information     Date Of Birth          1990        Visit Information        Provider Department      11/3/2017 2:00 PM Ernestine Campos NP [facility]        Today's Diagnoses     FIDELIA (generalized anxiety disorder)    -  1      Care Instructions    FIDELIA (generalized anxiety disorder)  Continue counseling   Start lexapro- titration start as below   Hydroxyzine as needed for severe anxiety (do not drive until you know how you respond)  - escitalopram (LEXAPRO) 20 MG tablet; Take 1/2 tablet (10 mg) for 1-2 weeks, then increase to 1 tablet orally daily  Dispense: 30 tablet; Refill: 1  - hydrOXYzine (ATARAX) 25 MG tablet; Take 1-2 tablets (25-50 mg) by mouth every 6 hours as needed for anxiety  Dispense: 60 tablet; Refill: 1    Self care discussed     See me back in 6 weeks for recheck  FYI due for physical and pap               Follow-ups after your visit        Who to contact     If you have questions or need follow up information about today's clinic visit or your schedule please contact [facility] directly at [phone].  Normal or non-critical lab and imaging results will be communicated to you by MyChart, letter or phone within 4 business days after the clinic has received the results. If you do not hear from us within 7 days, please contact the clinic through [employer] or phone. If you have a critical or abnormal lab result, we will notify you by phone as soon as possible.  Submit refill requests through [employer] or call your pharmacy and they will forward the refill request to us. Please allow 3 business days for your refill to be completed.          Additional Information About Your Visit        MyChart Information     [employer] gives you secure access to your electronic health record. If you see a primary care provider, you can also send messages to your care team and make  appointments. If you have questions, please call your primary care clinic.  If you do not have a primary care provider, please call [phone] and they will assist you.        Care EveryWhere ID     This is your Care EveryWhere ID. This could be used by other organizations to access your [facility] medical records  [id number]        Your Vitals Were     Pulse Temperature Respirations Pulse Oximetry BMI (Body Mass Index)       102 98  F (36.7  C) (Tympanic) 16 98% 20.35 kg/m2        Blood Pressure from Last 3 Encounters:   11/03/17 108/62   07/17/17 108/72   07/11/17 117/66    Weight from Last 3 Encounters:   11/03/17 128 lb (58.1 kg)   07/17/17 130 lb (59 kg)   07/11/17 131 lb (59.4 kg)              Today, you had the following     No orders found for display         Today's Medication Changes          These changes are accurate as of: 11/3/17  2:12 PM.  If you have any questions, ask your nurse or doctor.               Start taking these medicines.        Dose/Directions    escitalopram 20 MG tablet   Commonly known as:  LEXAPRO   Used for:  FIDELIA (generalized anxiety disorder)   Started by:  Ernestine Campos NP        Take 1/2 tablet (10 mg) for 1-2 weeks, then increase to 1 tablet orally daily   Quantity:  30 tablet   Refills:  1       hydrOXYzine 25 MG tablet   Commonly known as:  ATARAX   Used for:  FIDELIA (generalized anxiety disorder)   Started by:  Ernestine Campos NP        Dose:  25-50 mg   Take 1-2 tablets (25-50 mg) by mouth every 6 hours as needed for anxiety   Quantity:  60 tablet   Refills:  1            Where to get your medicines      These medications were [id number] [address] [facility]  [address] , [facility] [address]     Phone:  [phone]     escitalopram 20 MG tablet    hydrOXYzine 25 MG tablet                Primary Care Provider Office Phone # Fax #    Tammy Beth [phone] [address] [facility] [place] [address]        Equal  Access to Services     [facility]: Hadii aad ku hadrobertoraven Vanesamontrell, waenzoda luqadaha, qatristenta kaamilcarsunni pappas. So [facility] [phone].    ATENCIÓN: Si habla español, tiene a dang disposición servicios gratuitos de asistencia lingüística. Llame al [phone].    We comply with applicable federal civil rights laws and [place] laws. We do not discriminate on the basis of race, color, national origin, age, disability, sex, sexual orientation, or gender identity.            Thank you!     Thank you for choosing [facility]  for your care. Our goal is always to provide you with excellent care. Hearing back from our patients is one way we can continue to improve our services. Please take a few minutes to complete the written survey that you may receive in the mail after your visit with us. Thank you!             Your Updated Medication List - Protect others around you: Learn how to safely use, store and throw away your medicines at www.disposemymeds.org.          This list is accurate as of: 11/3/17  2:12 PM.  Always use your most recent med list.                   Brand Name Dispense Instructions for use Diagnosis    escitalopram 20 MG tablet    LEXAPRO    30 tablet    Take 1/2 tablet (10 mg) for 1-2 weeks, then increase to 1 tablet orally daily    FIDELIA (generalized anxiety disorder)       hydrOXYzine 25 MG tablet    ATARAX    60 tablet    Take 1-2 tablets (25-50 mg) by mouth every 6 hours as needed for anxiety    FIDELIA (generalized anxiety disorder)       levonorgestrel 20 MCG/24HR IUD    MIRENA    1 each    1 each (20 mcg) by Intrauterine route once 7/8/2016

## 2017-11-04 ASSESSMENT — ANXIETY QUESTIONNAIRES: GAD7 TOTAL SCORE: 13

## 2017-12-18 ENCOUNTER — OFFICE VISIT (OUTPATIENT)
Dept: FAMILY MEDICINE | Facility: CLINIC | Age: 27
End: 2017-12-18
Payer: COMMERCIAL

## 2017-12-18 VITALS
RESPIRATION RATE: 18 BRPM | WEIGHT: 129 LBS | BODY MASS INDEX: 20.51 KG/M2 | HEART RATE: 78 BPM | DIASTOLIC BLOOD PRESSURE: 77 MMHG | TEMPERATURE: 98.3 F | SYSTOLIC BLOOD PRESSURE: 115 MMHG

## 2017-12-18 DIAGNOSIS — F41.1 GAD (GENERALIZED ANXIETY DISORDER): ICD-10-CM

## 2017-12-18 PROCEDURE — 99213 OFFICE O/P EST LOW 20 MIN: CPT | Performed by: NURSE PRACTITIONER

## 2017-12-18 RX ORDER — ESCITALOPRAM OXALATE 20 MG/1
20 TABLET ORAL DAILY
Qty: 90 TABLET | Refills: 1 | Status: SHIPPED | OUTPATIENT
Start: 2017-12-18 | End: 2018-03-02

## 2017-12-18 ASSESSMENT — ANXIETY QUESTIONNAIRES
6. BECOMING EASILY ANNOYED OR IRRITABLE: NOT AT ALL
3. WORRYING TOO MUCH ABOUT DIFFERENT THINGS: NOT AT ALL
2. NOT BEING ABLE TO STOP OR CONTROL WORRYING: SEVERAL DAYS
IF YOU CHECKED OFF ANY PROBLEMS ON THIS QUESTIONNAIRE, HOW DIFFICULT HAVE THESE PROBLEMS MADE IT FOR YOU TO DO YOUR WORK, TAKE CARE OF THINGS AT HOME, OR GET ALONG WITH OTHER PEOPLE: NOT DIFFICULT AT ALL
5. BEING SO RESTLESS THAT IT IS HARD TO SIT STILL: NOT AT ALL
GAD7 TOTAL SCORE: 2
1. FEELING NERVOUS, ANXIOUS, OR ON EDGE: SEVERAL DAYS
7. FEELING AFRAID AS IF SOMETHING AWFUL MIGHT HAPPEN: NOT AT ALL

## 2017-12-18 ASSESSMENT — PATIENT HEALTH QUESTIONNAIRE - PHQ9
SUM OF ALL RESPONSES TO PHQ QUESTIONS 1-9: 4
5. POOR APPETITE OR OVEREATING: NOT AT ALL

## 2017-12-18 NOTE — PATIENT INSTRUCTIONS
Refilled lexapro for 6 months   Follow up in 6 months     Vitamin D 800 units daily   Complex B vitamin   Fish oil         Due for physical

## 2017-12-18 NOTE — NURSING NOTE
"Chief Complaint   Patient presents with     Anxiety       Initial /77  Pulse 78  Temp 98.3  F (36.8  C)  Resp 18  Wt 129 lb (58.5 kg)  BMI 20.51 kg/m2 Estimated body mass index is 20.51 kg/(m^2) as calculated from the following:    Height as of 7/11/17: 5' 6.5\" (1.689 m).    Weight as of this encounter: 129 lb (58.5 kg).  Medication Reconciliation: complete   Leyda Jeffers CMA      "

## 2017-12-18 NOTE — MR AVS SNAPSHOT
After Visit Summary   12/18/2017    Jackie Quispe    MRN: [id number]           Patient Information     Date Of Birth          1990        Visit Information        Provider Department      12/18/2017 2:40 PM Ernestine Campos NP [facility]        Today's Diagnoses     FIDELIA (generalized anxiety disorder)          Care Instructions    Refilled lexapro for 6 months   Follow up in 6 months     Vitamin D 800 units daily   Complex B vitamin   Fish oil         Due for physical           Follow-ups after your visit        Who to contact     If you have questions or need follow up information about today's clinic visit or your schedule please contact [facility] directly at [phone].  Normal or non-critical lab and imaging results will be communicated to you by MyChart, letter or phone within 4 business days after the clinic has received the results. If you do not hear from us within 7 days, please contact the clinic through [email or web address] or phone. If you have a critical or abnormal lab result, we will notify you by phone as soon as possible.  Submit refill requests through [facility] or call your pharmacy and they will forward the refill request to us. Please allow 3 business days for your refill to be completed.          Additional Information About Your Visit        MyChart Information     [facility] gives you secure access to your electronic health record. If you see a primary care provider, you can also send messages to your care team and make appointments. If you have questions, please call your primary care clinic.  If you do not have a primary care provider, please call [phone] and they will assist you.        Care EveryWhere ID     This is your Care EveryWhere ID. This could be used by other organizations to access your [place] medical records  [id number]        Your Vitals Were     Pulse Temperature Respirations BMI (Body Mass Index)          78 98.3  F (36.8  C) 18  20.51 kg/m2         Blood Pressure from Last 3 Encounters:   12/18/17 115/77   11/03/17 108/62   07/17/17 108/72    Weight from Last 3 Encounters:   12/18/17 129 lb (58.5 kg)   11/03/17 128 lb (58.1 kg)   07/17/17 130 lb (59 kg)              Today, you had the following     No orders found for display         Today's Medication Changes          These changes are accurate as of: 12/18/17  2:58 PM.  If you have any questions, ask your nurse or doctor.               These medicines have changed or have updated prescriptions.        Dose/Directions    escitalopram 20 MG tablet   Commonly known as:  LEXAPRO   This may have changed:    - how much to take  - how to take this  - when to take this  - additional instructions   Used for:  FIDELIA (generalized anxiety disorder)   Changed by:  Ernestine Campos NP        Dose:  20 mg   Take 1 tablet (20 mg) by mouth daily   Quantity:  90 tablet   Refills:  1            Where to get your medicines      These medications were sent to [facility] [address] [place] [address]  [address] [facility] [address]     Phone:  [phone]     escitalopram 20 MG tablet                Primary Care Provider Office Phone # Fax #    Tammy Beth [phone] [address] [facility] [address]        Equal Access to Services     DILMA QUINONES AH: Nikole marshall hadasho Somarquitaali, waaxda luqadaha, qaybta kaalmada adeegyada, sunni lee. So [facility] [phone].    ATENCIÓN: Si habla español, tiene a dang disposición servicios gratuitos de asistencia lingüística. Llame al [phone].    We comply with applicable federal civil rights laws and [place] laws. We do not discriminate on the basis of race, color, national origin, age, disability, sex, sexual orientation, or gender identity.            Thank you!     Thank you for choosing [facility]  for your care. Our goal is always to provide you with excellent care.  Hearing back from our patients is one way we can continue to improve our services. Please take a few minutes to complete the written survey that you may receive in the mail after your visit with us. Thank you!             Your Updated Medication List - Protect others around you: Learn how to safely use, store and throw away your medicines at www.disposemymeds.org.          This list is accurate as of: 12/18/17  2:58 PM.  Always use your most recent med list.                   Brand Name Dispense Instructions for use Diagnosis    escitalopram 20 MG tablet    LEXAPRO    90 tablet    Take 1 tablet (20 mg) by mouth daily    FIDELIA (generalized anxiety disorder)       hydrOXYzine 25 MG tablet    ATARAX    60 tablet    Take 1-2 tablets (25-50 mg) by mouth every 6 hours as needed for anxiety    FIDELIA (generalized anxiety disorder)       levonorgestrel 20 MCG/24HR IUD    MIRENA    1 each    1 each (20 mcg) by Intrauterine route once 7/8/2016

## 2017-12-18 NOTE — PROGRESS NOTES
SUBJECTIVE:   Jackie Quispe is a 27 year old female who presents to clinic today for the following health issues:      Anxiety Follow-Up    Status since last visit: Improved     Other associated symptoms:None    Complicating factors:   Significant life event: Yes-  Moved    Current substance abuse: None  Depression symptoms: No  FIDELIA-7 SCORE 11/3/2017 12/18/2017   Total Score 13 2     PHQ-9 SCORE 1/14/2016 11/3/2017 12/18/2017   Total Score - - -   Total Score 2 5 4       GAD7              Amount of exercise or physical activity: None    Problems taking medications regularly: No    Medication side effects: loss  of appetite     Diet: regular (no restrictions)    lexapro has been helpful   Some poor appetite      Wt Readings from Last 10 Encounters:   12/18/17 129 lb (58.5 kg)   11/03/17 128 lb (58.1 kg)   07/17/17 130 lb (59 kg)   07/11/17 131 lb (59.4 kg)   09/13/16 132 lb (59.9 kg)   08/02/16 130 lb 12.8 oz (59.3 kg)   07/08/16 127 lb 3.2 oz (57.7 kg)   01/14/16 136 lb 6.4 oz (61.9 kg)   12/01/15 153 lb 3.2 oz (69.5 kg)   11/24/15 152 lb 3.2 oz (69 kg)         Problem list and histories reviewed & adjusted, as indicated.  Additional history: as documented    Patient Active Problem List   Diagnosis     Smoker     Acne      mirena IUD     Dysfunctional uterine bleeding     FIDELIA (generalized anxiety disorder)     Past Surgical History:   Procedure Laterality Date     MOUTH SURGERY      wisdom teeth       Social History   Substance Use Topics     Smoking status: Current Every Day Smoker     Packs/day: 0.50     Types: Cigarettes     Smokeless tobacco: Never Used      Comment: smoking  10 cig/day     Alcohol use Yes      Comment: Occas- quit with pregnacny     Family History   Problem Relation Age of Onset     Alcohol/Drug Maternal Grandmother      alcohol     Hypertension Paternal Grandfather      CANCER Paternal Grandfather      HEART DISEASE Paternal Grandfather      CANCER Maternal Grandfather      lung      Alzheimer Disease Paternal Grandmother      Family History Negative Mother      Cardiovascular Father 54     MI     HEART DISEASE Father              Reviewed and updated as needed this visit by clinical staffTobacco  Allergies  Meds  Med Hx  Surg Hx  Fam Hx  Soc Hx      Reviewed and updated as needed this visit by Provider         ROS:  Constitutional, HEENT, cardiovascular, pulmonary, gi and gu systems are negative, except as otherwise noted.      OBJECTIVE:                                                    /77  Pulse 78  Temp 98.3  F (36.8  C)  Resp 18  Wt 129 lb (58.5 kg)  BMI 20.51 kg/m2  Body mass index is 20.51 kg/(m^2).  GENERAL APPEARANCE: healthy, alert and no distress  PSYCH: mentation appears normal and affect normal/bright    Diagnostic test results:  Diagnostic Test Results:  none        ASSESSMENT/PLAN:                                                    1. FIDELIA (generalized anxiety disorder)  Much improved  - escitalopram (LEXAPRO) 20 MG tablet; Take 1 tablet (20 mg) by mouth daily  Dispense: 90 tablet; Refill: 1      Patient Instructions   Refilled lexapro for 6 months   Follow up in 6 months     Vitamin D 800 units daily   Complex B vitamin   Fish oil         Due for physical       Ernestine Campos NP  [facility]

## 2017-12-19 ASSESSMENT — ANXIETY QUESTIONNAIRES: GAD7 TOTAL SCORE: 2

## 2018-01-07 ENCOUNTER — HEALTH MAINTENANCE LETTER (OUTPATIENT)
Age: 28
End: 2018-01-07

## 2018-01-10 ENCOUNTER — OFFICE VISIT (OUTPATIENT)
Dept: FAMILY MEDICINE | Facility: CLINIC | Age: 28
End: 2018-01-10
Payer: COMMERCIAL

## 2018-01-10 VITALS
HEIGHT: 67 IN | WEIGHT: 126 LBS | SYSTOLIC BLOOD PRESSURE: 126 MMHG | HEART RATE: 76 BPM | TEMPERATURE: 98.1 F | DIASTOLIC BLOOD PRESSURE: 72 MMHG | BODY MASS INDEX: 19.78 KG/M2 | RESPIRATION RATE: 20 BRPM

## 2018-01-10 DIAGNOSIS — F17.200 SMOKER: ICD-10-CM

## 2018-01-10 DIAGNOSIS — Z13.6 CARDIOVASCULAR SCREENING; LDL GOAL LESS THAN 160: ICD-10-CM

## 2018-01-10 DIAGNOSIS — Z00.00 ROUTINE GENERAL MEDICAL EXAMINATION AT A HEALTH CARE FACILITY: Primary | ICD-10-CM

## 2018-01-10 DIAGNOSIS — Z11.3 SCREEN FOR STD (SEXUALLY TRANSMITTED DISEASE): ICD-10-CM

## 2018-01-10 DIAGNOSIS — Z12.4 CERVICAL CANCER SCREENING: ICD-10-CM

## 2018-01-10 LAB
SPECIMEN SOURCE: NORMAL
WET PREP SPEC: NORMAL

## 2018-01-10 PROCEDURE — 99395 PREV VISIT EST AGE 18-39: CPT | Performed by: NURSE PRACTITIONER

## 2018-01-10 PROCEDURE — 87210 SMEAR WET MOUNT SALINE/INK: CPT | Performed by: NURSE PRACTITIONER

## 2018-01-10 PROCEDURE — 87591 N.GONORRHOEAE DNA AMP PROB: CPT | Performed by: NURSE PRACTITIONER

## 2018-01-10 PROCEDURE — 87491 CHLMYD TRACH DNA AMP PROBE: CPT | Performed by: NURSE PRACTITIONER

## 2018-01-10 PROCEDURE — G0145 SCR C/V CYTO,THINLAYER,RESCR: HCPCS | Performed by: NURSE PRACTITIONER

## 2018-01-10 NOTE — LETTER
[facility]  [address] [facility] [address]  [phone]        January 18, 2019    Jackie Quispe  [address] [place]   \[place]\"" [id number]              Dear Jackie Quispe    This is to remind you that your provider wanted you to return to the clinic for fasting lab test(s),    please fast for 10-12 hours. Morning medications can be taken with water.    You may call our office at [facility] at [phone] to schedule an appointment.    Please disregard this notice if you have already had your labs drawn or made an appointment.        Sincerely,        Debbi Nam CNP/ nava

## 2018-01-10 NOTE — NURSING NOTE
"Chief Complaint   Patient presents with     Physical       Initial /72 (BP Location: Right arm, Patient Position: Sitting, Cuff Size: Adult Regular)  Pulse 76  Temp 98.1  F (36.7  C) (Tympanic)  Resp 20  Ht 5' 6.75\" (1.695 m)  Wt 126 lb (57.2 kg)  BMI 19.88 kg/m2 Estimated body mass index is 19.88 kg/(m^2) as calculated from the following:    Height as of this encounter: 5' 6.75\" (1.695 m).    Weight as of this encounter: 126 lb (57.2 kg).  Medication Reconciliation: complete    Health Maintenance that is potentially due pending provider review:  Pap Smear    Possibly completing today per provider review.    Is there anyone who you would like to be able to receive your results? No  If yes have patient fill out JESSICA    "

## 2018-01-10 NOTE — PROGRESS NOTES
SUBJECTIVE:   CC: Jackie Quispe is an 27 year old woman who presents for preventive health visit.     Healthy Habits:    Do you get at least three servings of calcium containing foods daily (dairy, green leafy vegetables, etc.)? yes    Amount of exercise or daily activities, outside of work: none    Problems taking medications regularly No    Medication side effects: No    Have you had an eye exam in the past two years? yes    Do you see a dentist twice per year? yes    Do you have sleep apnea, excessive snoring or daytime drowsiness?no    She would like STD testing    Today's PHQ-2 Score:   PHQ-2 ( 1999 Pf[employer]) 1/10/2018 1/14/2016   Q1: Little interest or pleasure in doing things 0 0   Q2: Feeling down, depressed or hopeless 0 0   PHQ-2 Score 0 0     Abuse: Current or Past(Physical, Sexual or Emotional)- No  Do you feel safe in your environment - Yes    Social History   Substance Use Topics     Smoking status: Current Every Day Smoker     Packs/day: 0.50     Types: Cigarettes     Smokeless tobacco: Never Used      Comment: smoking  10 cig/day     Alcohol use Yes      Comment: Occas- quit with pregnacny     If you drink alcohol do you typically have >3 drinks per day or >7 drinks per week? No                     Reviewed orders with patient.  Reviewed health maintenance and updated orders accordingly - Yes  Labs reviewed in EPIC  Patient Active Problem List   Diagnosis     Smoker     Acne      mirena IUD     Dysfunctional uterine bleeding     FIDELIA (generalized anxiety disorder)     CARDIOVASCULAR SCREENING; LDL GOAL LESS THAN 160     Past Surgical History:   Procedure Laterality Date     MOUTH SURGERY      wisdom teeth       Social History   Substance Use Topics     Smoking status: Current Every Day Smoker     Packs/day: 0.50     Types: Cigarettes     Smokeless tobacco: Never Used      Comment: smoking  10 cig/day     Alcohol use Yes      Comment: Occas- quit with pregnacny     Family History   Problem Relation  Age of Onset     Alcohol/Drug Maternal Grandmother      alcohol     Hypertension Paternal Grandfather      CANCER Paternal Grandfather      HEART DISEASE Paternal Grandfather      CANCER Maternal Grandfather      lung     Alzheimer Disease Paternal Grandmother      Family History Negative Mother      Cardiovascular Father 54     MI     HEART DISEASE Father          Current Outpatient Prescriptions   Medication Sig Dispense Refill     escitalopram (LEXAPRO) 20 MG tablet Take 1 tablet (20 mg) by mouth daily 90 tablet 1     hydrOXYzine (ATARAX) 25 MG tablet Take 1-2 tablets (25-50 mg) by mouth every 6 hours as needed for anxiety 60 tablet 1     levonorgestrel (MIRENA) 20 MCG/24HR IUD 1 each (20 mcg) by Intrauterine route once 7/8/2016 1 each 0         Mammogram not appropriate for this patient based on age.    Pertinent mammograms are reviewed under the imaging tab.  History of abnormal Pap smear: NO - age 21-29 PAP every 3 years recommended  Lab Results   Component Value Date    PAP NIL 11/03/2014    PAP NIL 05/08/2012         Reviewed and updated as needed this visit by clinical staffTobacco  Allergies  Meds  Problems  Med Hx  Surg Hx  Fam Hx  Soc Hx          Reviewed and updated as needed this visit by Provider  Allergies  Meds  Problems         ROS:  C: NEGATIVE for fever, chills, change in weight  I: NEGATIVE for worrisome rashes, moles or lesions  E: NEGATIVE for vision changes or irritation, glasses for distance  ENT: NEGATIVE for ear, mouth and throat problems  R: NEGATIVE for significant cough or SOB  B: NEGATIVE for masses, tenderness or discharge  CV: NEGATIVE for chest pain, palpitations or peripheral edema  GI: NEGATIVE for nausea, abdominal pain, heartburn, or change in bowel habits  : NEGATIVE for unusual urinary or vaginal symptoms. Periods are regular.  M: NEGATIVE for significant arthralgias or myalgia  N: NEGATIVE for weakness, dizziness or paresthesias  P: NEGATIVE for changes in mood or  "affect    OBJECTIVE:   /72 (BP Location: Right arm, Patient Position: Sitting, Cuff Size: Adult Regular)  Pulse 76  Temp 98.1  F (36.7  C) (Tympanic)  Resp 20  Ht 5' 6.75\" (1.695 m)  Wt 126 lb (57.2 kg)  BMI 19.88 kg/m2  EXAM:  GENERAL: healthy, alert and no distress  EYES: Eyes grossly normal to inspection, PERRL and conjunctivae and sclerae normal  HENT: ear canals and TM's normal, nose and mouth without ulcers or lesions  NECK: no adenopathy, no asymmetry, masses, or scars and thyroid normal to palpation  RESP: lungs clear to auscultation - no rales, rhonchi or wheezes  BREAST:deferred  CV: regular rate and rhythm, normal S1 S2, no S3 or S4, no murmur, click or rub, no peripheral edema and peripheral pulses strong  ABDOMEN: soft, nontender, no hepatosplenomegaly, no masses and bowel sounds normal   (female): normal female external genitalia, normal urethral meatus, vaginal mucosa pink, moist, well rugated, and normal cervix/adnexa/uterus without masses or discharge, IUD string in place  MS: no gross musculoskeletal defects noted, no edema  SKIN: no suspicious lesions or rashes  NEURO: Normal strength and tone, mentation intact and speech normal  PSYCH: mentation appears normal, affect normal/bright    ASSESSMENT/PLAN:     1. Routine general medical examination at a health care facility  Physical    2. Screen for STD (sexually transmitted disease)  Screen  - Wet prep  - NEISSERIA GONORRHOEA PCR  - CHLAMYDIA TRACHOMATIS PCR    3. Cervical cancer screening  Screen  - PAP imaged thin layer screen reflex to HPV if ASCUS - recommended age 25 - 29 years    4. Smoker  Chronic, stable  - Let me know if you feel like you want to use something to help you quit- info below.   QUITPLAN: 2-953-327-PLAN (5430)  Everyone in [place] has access to free telephone helpline support to quit tobacco either through their health plan or through QUITPLAN Services. The QUITPLAN web program is free to everyone regardless of " coverage.   The QUITPLAN Helpline, including gum, patches, lozenges, is free to the uninsured and those without coverage.     5. CARDIOVASCULAR SCREENING; LDL GOAL LESS THAN 160  Screen  - Lipid panel reflex to direct LDL Fasting; Future  - Schedule a lab only appointment anytime (fasting 8 hours)    Preventive Health Recommendations  Female Ages 26 - 39  Yearly exam:   See your health care provider every year in order to    Review health changes.     Discuss preventive care.      Review your medicines if you your doctor has prescribed any.    Until age 30: Get a Pap test every three years (more often if you have had an abnormal result).    After age 30: Talk to your doctor about whether you should have a Pap test every 3 years or have a Pap test with HPV screening every 5 years.   You do not need a Pap test if your uterus was removed (hysterectomy) and you have not had cancer.  You should be tested each year for STDs (sexually transmitted diseases), if you're at risk.   Talk to your provider about how often to have your cholesterol checked.  If you are at risk for diabetes, you should have a diabetes test (fasting glucose).  Shots: Get a flu shot each year. Get a tetanus shot every 10 years.   Nutrition:     Eat at least 5 servings of fruits and vegetables each day.    Eat whole-grain bread, whole-wheat pasta and brown rice instead of white grains and rice.    Talk to your provider about Calcium and Vitamin D.     Lifestyle    Exercise at least 150 minutes a week (30 minutes a day, 5 days of the week). This will help you control your weight and prevent disease.    Limit alcohol to one drink per day.    No smoking.     Wear sunscreen to prevent skin cancer.    See your dentist every six months for an exam and cleaning.    HOW TO STOP SMOKING    Cigarette smoking can be a hard habit to break that s because:  Nicotine is physically addicting.  You actually feel a craving for cigarettes.  The force of habit is strong.   Smoking may seem a necessary part of your daily life.    Have you tried quitting already?  You re not alone.  If you re like most smokers, you:  would like to break the habit  have tried to quit at least once  must try to quit several times before you succeed.    Smoking harms your health in many ways.  It s no secret that smoking can lead to:  Respiratory Problems and many different types of cancer.    When you quit smoking, you ll feel better!  You ll breathe more easily, and have more stamina.  Smoker s cough should disappear.  Your sense of taste and smell will improve, and your digestion may improve  Risks will go down for both heart attack and cancer.  Your lungs will work better as harmful elements are cleaned out.    There are medications that can help you to quit smoking-ask your physician.    Consider nicotine replacement.  Your chances of quitting are much better with the nicotine patch or gum - and you don t need a prescription.    Get help.  The more support you get, the better your chances of quitting.  Talk to someone who supports your effort to quit.    Counseling and nicotine replacement together are more effective than either one alone.    Where can I get more information about quitting?  For information and referrals to smoking cessation programs in you area, call:  [place] Quit plan helpline - 2-647-088-PLAN (1-746.115.7513)                    http://quitplan.[employer].com/p/quitplan/triage.jl  [place] Tobacco Quit Line - 1-357-603-STOP (8-657-316-1267)  American Cancer Society- 1-463-VNB-2345 ( 2-552-625-8407)  American Lung Association - 1-800-LUNG-[place] (0-466-398-2167)  National Cancer [place] - 9-380-0-CANCER   (1-562.203.3716)    Don t let your health go up in smoke!  It is possible to quit!  Decide that you want to quit  Set a quit date and stick to it  Get support from your friends, family and healthcare provider        Genital Herpes    Genital herpes is a common sexually transmitted  disease (STD). It is caused by the herpes simplex virus (HSV). One out of five teens and adults carry the herpes virus. During an outbreak, it causes small blisters that break open, leaving small, painful sores in the genital area. Eventually, scabs form and the sores heal. In women, these show up most often on the skin just outside the vaginal opening. They can occur on the buttocks, anus, or cervix. In men, the sores are usually on the tip, sides, or base of the penis. They also occur on the scrotum, buttocks, or thighs.  The first outbreak begins within 2 to 3 weeks after exposure to an infected sexual partner. It may last 1 to 3 weeks. It may cause headache, muscle ache, and fevers. The first outbreak is usually the worst. Because the virus remains in the body even after the sores heal, most people will have more outbreaks. The frequency of outbreaks is different for each person. Some people will never have another outbreak. Others will have several episodes a year. Later outbreaks are usually shorter, milder, and less painful. For many, the number of outbreaks tends to decrease over time. Various factors may trigger an outbreak. These include:    Emotional stress    Menstruation    Presence of another illness (cold, flu, or fever from any cause)    Overexertion and fatigue    Weakened immune system  Home care    It is very important that you do not have sexual relations until all the herpes sores have healed completely.    Wash the affected area gently with mild soap and water. Wash your hands after touching the affected area.    You may use over-the-counter pain medicine unless another pain medicine was prescribed. (Note: If you have chronic liver or kidney disease or have ever had a stomach ulcer or gastrointestinal bleeding, talk with your healthcare provider before using these medicines. Also talk to your provider if you are taking medicine to prevent blood clots.) Aspirin should never be given to anyone  younger than 18 years of age who is ill with a viral infection or fever. It may cause severe liver or brain damage.    Your healthcare provider may prescribe antiviral medicine during the first outbreak. This will help the sores heal faster. Antiviral medicine may also be prescribed so that you have it ready to take at the first sign of another outbreak. This will help the symptoms go away sooner. For people with frequent outbreaks, daily therapy may be prescribed. This will help reduce the frequency of attacks. Daily therapy may also reduce risk of spread of herpes to your sexual partner. Discuss the risks and benefits of daily therapy with your healthcare provider.    If you are a woman who is pregnant now or may become pregnant in the future, let your healthcare provider know that you have had herpes. This may affect the way your baby is delivered.  Preventing spread to others  The virus is spread by sexual contact with someone who has the herpes virus. The risk of spread is highest when the sores are present. However, there is a chance of spreading the virus even when sores are not visible. Inform future sexual partners that you have herpes and that they may become infected. To reduce the risk of passing the virus to a partner who has never had herpes, avoid sexual relations at the first sign of an outbreak and until the sores are fully healed. Latex barriers, such as condoms, reduce the risk of spread between outbreaks if the infected site is covered, but they do not guarantee protection.  Follow-up care  Follow up with your healthcare provider, or as advised.  People who have just learned that they have herpes may feel upset. Getting the facts about herpes can help you feel more in control. Follow up with your healthcare provider or the public health department for complete STD screening, including HIV testing. For more information about herpes, visit the Centers for Disease Control (CDC) Genital Herpes  "website at: www.cdc.gov/std/Herpes/default.htm.  When to seek medical advice  Call your healthcare provider right away if any of these occur:    Inability to urinate due to pain    Swelling or increasing redness in the genital area    Unusual drowsiness, weakness, or confusion    Headache or stiff neck    Discharge from the vagina or penis    Increasing back or abdominal pain    Rash or joint pain  Date Last Reviewed: 9/25/2015 2000-2017 [employer]. [address] [place] [address]. All rights reserved. This information is not intended as a substitute for professional medical care. Always follow your healthcare professional's instructions.              COUNSELING:   Reviewed preventive health counseling, as reflected in patient instructions       Regular exercise       Healthy diet/nutrition       Safe sex practices/STD prevention       Vitamin D         reports that she has been smoking Cigarettes.  She has been smoking about 0.50 packs per day. She has never used smokeless tobacco.  Tobacco Cessation Action Plan: Information offered: Patient not interested at this time  Estimated body mass index is 19.88 kg/(m^2) as calculated from the following:    Height as of this encounter: 5' 6.75\" (1.695 m).    Weight as of this encounter: 126 lb (57.2 kg).       Counseling Resources:  ATP IV Guidelines  Pooled Cohorts Equation Calculator  Breast Cancer Risk Calculator  FRAX Risk Assessment  ICSI Preventive Guidelines  Dietary Guidelines for Americans, 2010  USDA's MyPlate  ASA Prophylaxis  Lung CA Screening    Debbi Nam, CNP  [facility]  "

## 2018-01-10 NOTE — LETTER
January 11, 2018      Jackie Quispe  [address] [facility] [address]        Dear ,    We are writing to inform you of your test results.    Negative Chlamydia and gonorrhea.      Resulted Orders   Wet prep   Result Value Ref Range    Specimen Description Vagina     Wet Prep No Trichomonas seen     Wet Prep No clue cells seen     Wet Prep No yeast seen    NEISSERIA GONORRHOEA PCR   Result Value Ref Range    Specimen Descrip Cervix     N Gonorrhea PCR Negative NEG^Negative      Comment:      Negative for N. gonorrhoeae rRNA by transcription mediated amplification.  A negative result by transcription mediated amplification does not preclude   the presence of N. gonorrhoeae infection because results are dependent on   proper and adequate collection, absence of inhibitors, and sufficient rRNA to   be detected.     CHLAMYDIA TRACHOMATIS PCR   Result Value Ref Range    Specimen Description Cervix     Chlamydia Trachomatis PCR Negative NEG^Negative      Comment:      Negative for C. trachomatis rRNA by transcription mediated amplification.  A negative result by transcription mediated amplification does not preclude   the presence of C. trachomatis infection because results are dependent on   proper and adequate collection, absence of inhibitors, and sufficient rRNA to   be detected.     Sincerely, Debbi Nam CNP    If you have any questions or concerns, please call the clinic at the number listed above.

## 2018-01-10 NOTE — MR AVS SNAPSHOT
After Visit Summary   1/10/2018    Jackie Quispe    MRN: [id number]           Patient Information     Date Of Birth          1990        Visit Information        Provider Department      1/10/2018 4:00 PM Debbi Nam, CNP [facility]        Today's Diagnoses     Routine general medical examination at a health care facility    -  1    Screen for STD (sexually transmitted disease)        Cervical cancer screening        Smoker        CARDIOVASCULAR SCREENING; LDL GOAL LESS THAN 160          Care Instructions    1. Routine general medical examination at a health care facility  Physical    2. Screen for STD (sexually transmitted disease)  Screen  - Wet prep  - NEISSERIA GONORRHOEA PCR  - CHLAMYDIA TRACHOMATIS PCR    3. Cervical cancer screening  Screen  - PAP imaged thin layer screen reflex to HPV if ASCUS - recommended age 25 - 29 years    4. Smoker  Chronic, stable  - Let me know if you feel like you want to use something to help you quit- info below.   QUITPLAN: 5-455-313-PLAN (4146)  Everyone in [place] has access to free telephone helpline support to quit tobacco either through their health plan or through QUITPLAN Services. The QUITPLAN web program is free to everyone regardless of coverage.   The QUITPLAN Helpline, including gum, patches, lozenges, is free to the uninsured and those without coverage.     5. CARDIOVASCULAR SCREENING; LDL GOAL LESS THAN 160  Screen  - Lipid panel reflex to direct LDL Fasting; Future  - Schedule a lab only appointment anytime (fasting 8 hours)    Preventive Health Recommendations  Female Ages 26 - 39  Yearly exam:   See your health care provider every year in order to    Review health changes.     Discuss preventive care.      Review your medicines if you your doctor has prescribed any.    Until age 30: Get a Pap test every three years (more often if you have had an abnormal result).    After age 30: Talk to your doctor about whether  you should have a Pap test every 3 years or have a Pap test with HPV screening every 5 years.   You do not need a Pap test if your uterus was removed (hysterectomy) and you have not had cancer.  You should be tested each year for STDs (sexually transmitted diseases), if you're at risk.   Talk to your provider about how often to have your cholesterol checked.  If you are at risk for diabetes, you should have a diabetes test (fasting glucose).  Shots: Get a flu shot each year. Get a tetanus shot every 10 years.   Nutrition:     Eat at least 5 servings of fruits and vegetables each day.    Eat whole-grain bread, whole-wheat pasta and brown rice instead of white grains and rice.    Talk to your provider about Calcium and Vitamin D.     Lifestyle    Exercise at least 150 minutes a week (30 minutes a day, 5 days of the week). This will help you control your weight and prevent disease.    Limit alcohol to one drink per day.    No smoking.     Wear sunscreen to prevent skin cancer.    See your dentist every six months for an exam and cleaning.    HOW TO STOP SMOKING    Cigarette smoking can be a hard habit to break that s because:  Nicotine is physically addicting.  You actually feel a craving for cigarettes.  The force of habit is strong.  Smoking may seem a necessary part of your daily life.    Have you tried quitting already?  You re not alone.  If you re like most smokers, you:  would like to break the habit  have tried to quit at least once  must try to quit several times before you succeed.    Smoking harms your health in many ways.  It s no secret that smoking can lead to:  Respiratory Problems and many different types of cancer.    When you quit smoking, you ll feel better!  You ll breathe more easily, and have more stamina.  Smoker s cough should disappear.  Your sense of taste and smell will improve, and your digestion may improve  Risks will go down for both heart attack and cancer.  Your lungs will work better  as harmful elements are cleaned out.    There are medications that can help you to quit smoking-ask your physician.    Consider nicotine replacement.  Your chances of quitting are much better with the nicotine patch or gum - and you don t need a prescription.    Get help.  The more support you get, the better your chances of quitting.  Talk to someone who supports your effort to quit.    Counseling and nicotine replacement together are more effective than either one alone.    Where can I get more information about quitting?  For information and referrals to smoking cessation programs in you area, call:  [place] Quit plan helpline - 8-441-722-PLAN (1-376.349.3548)                    http://quitplan.[email or web address]/quitplan/[email or web address]  [place] Tobacco Quit Line - 6-508-173-STOP (1-640.441.4553)  American Cancer Society- 1-695-KDL-2345 ( 1-714.952.9357)  American Lung Association - 1-800-LUNG-USA (1-235.897.1484)  National Cancer [place] - 3-624-6-CANCER   (1-610.595.2847)    Don t let your health go up in smoke!  It is possible to quit!  Decide that you want to quit  Set a quit date and stick to it  Get support from your friends, family and healthcare provider        Genital Herpes    Genital herpes is a common sexually transmitted disease (STD). It is caused by the herpes simplex virus (HSV). One out of five teens and adults carry the herpes virus. During an outbreak, it causes small blisters that break open, leaving small, painful sores in the genital area. Eventually, scabs form and the sores heal. In women, these show up most often on the skin just outside the vaginal opening. They can occur on the buttocks, anus, or cervix. In men, the sores are usually on the tip, sides, or base of the penis. They also occur on the scrotum, buttocks, or thighs.  The first outbreak begins within 2 to 3 weeks after exposure to an infected sexual partner. It may last 1 to 3 weeks. It may cause headache, muscle ache, and  fevers. The first outbreak is usually the worst. Because the virus remains in the body even after the sores heal, most people will have more outbreaks. The frequency of outbreaks is different for each person. Some people will never have another outbreak. Others will have several episodes a year. Later outbreaks are usually shorter, milder, and less painful. For many, the number of outbreaks tends to decrease over time. Various factors may trigger an outbreak. These include:    Emotional stress    Menstruation    Presence of another illness (cold, flu, or fever from any cause)    Overexertion and fatigue    Weakened immune system  Home care    It is very important that you do not have sexual relations until all the herpes sores have healed completely.    Wash the affected area gently with mild soap and water. Wash your hands after touching the affected area.    You may use over-the-counter pain medicine unless another pain medicine was prescribed. (Note: If you have chronic liver or kidney disease or have ever had a stomach ulcer or gastrointestinal bleeding, talk with your healthcare provider before using these medicines. Also talk to your provider if you are taking medicine to prevent blood clots.) Aspirin should never be given to anyone younger than 18 years of age who is ill with a viral infection or fever. It may cause severe liver or brain damage.    Your healthcare provider may prescribe antiviral medicine during the first outbreak. This will help the sores heal faster. Antiviral medicine may also be prescribed so that you have it ready to take at the first sign of another outbreak. This will help the symptoms go away sooner. For people with frequent outbreaks, daily therapy may be prescribed. This will help reduce the frequency of attacks. Daily therapy may also reduce risk of spread of herpes to your sexual partner. Discuss the risks and benefits of daily therapy with your healthcare provider.    If you are  a woman who is pregnant now or may become pregnant in the future, let your healthcare provider know that you have had herpes. This may affect the way your baby is delivered.  Preventing spread to others  The virus is spread by sexual contact with someone who has the herpes virus. The risk of spread is highest when the sores are present. However, there is a chance of spreading the virus even when sores are not visible. Inform future sexual partners that you have herpes and that they may become infected. To reduce the risk of passing the virus to a partner who has never had herpes, avoid sexual relations at the first sign of an outbreak and until the sores are fully healed. Latex barriers, such as condoms, reduce the risk of spread between outbreaks if the infected site is covered, but they do not guarantee protection.  Follow-up care  Follow up with your healthcare provider, or as advised.  People who have just learned that they have herpes may feel upset. Getting the facts about herpes can help you feel more in control. Follow up with your healthcare provider or the public health department for complete STD screening, including HIV testing. For more information about herpes, visit the Centers for Disease Control (CDC) Genital Herpes website at: www.cdc.gov/std/Herpes/default.htm.  When to seek medical advice  Call your healthcare provider right away if any of these occur:    Inability to urinate due to pain    Swelling or increasing redness in the genital area    Unusual drowsiness, weakness, or confusion    Headache or stiff neck    Discharge from the vagina or penis    Increasing back or abdominal pain    Rash or joint pain  Date Last Reviewed: 9/25/2015 2000-2017 [employer]. [address] [place] [address], [place] [address]. All rights reserved. This information is not intended as a substitute for professional medical care. Always follow your healthcare professional's instructions.                 "Follow-ups after your visit        Future tests that were ordered for you today     Open Future Orders        Priority Expected Expires Ordered    Lipid panel reflex to direct LDL Fasting Routine 1/10/2018 1/10/2019 1/10/2018            Who to contact     If you have questions or need follow up information about today's clinic visit or your schedule please contact [facility] directly at [phone].  Normal or non-critical lab and imaging results will be communicated to you by Astrostarhart, letter or phone within 4 business days after the clinic has received the results. If you do not hear from us within 7 days, please contact the clinic through [employer] Intelligence System[employer] or phone. If you have a critical or abnormal lab result, we will notify you by phone as soon as possible.  Submit refill requests through [employer]ink or call your pharmacy and they will forward the refill request to us. Please allow 3 business days for your refill to be completed.          Additional Information About Your Visit        Astrostarhart Information     [employer] gives you secure access to your electronic health record. If you see a primary care provider, you can also send messages to your care team and make appointments. If you have questions, please call your primary care clinic.  If you do not have a primary care provider, please call [phone] and they will assist you.        Care EveryWhere ID     This is your Care EveryWhere ID. This could be used by other organizations to access your [facility] medical records  [id number]        Your Vitals Were     Pulse Temperature Respirations Height BMI (Body Mass Index)       76 98.1  F (36.7  C) (Tympanic) 20 5' 6.75\" (1.695 m) 19.88 kg/m2        Blood Pressure from Last 3 Encounters:   01/10/18 126/72   12/18/17 115/77   11/03/17 108/62    Weight from Last 3 Encounters:   01/10/18 126 lb (57.2 kg)   12/18/17 129 lb (58.5 kg)   11/03/17 128 lb (58.1 kg)              We Performed the Following     CHLAMYDIA " TRACHOMATIS PCR     NEISSERIA GONORRHOEA PCR     PAP imaged thin layer screen reflex to HPV if ASCUS - recommended age 25 - 29 years     Wet prep        Primary Care Provider Office Phone # Fax #    Tammy Beth [phone] [address] [facility] [place] [place] [address]        Equal Access to Services     DILMA QUINONES : Hadii aad ku hadasho Soomaali, waaxda luqadaha, qaybta kaalmada ademarinoyada, sunni vargasdomingojazmin lee. So [facility] [phone].    ATENCIÓN: Si habla español, tiene a dang disposición servicios gratuitos de asistencia lingüística. [facility] [phone].    We comply with applicable federal civil rights laws and [place] laws. We do not discriminate on the basis of race, color, national origin, age, disability, sex, sexual orientation, or gender identity.            Thank you!     Thank you for choosing [facility]  for your care. Our goal is always to provide you with excellent care. Hearing back from our patients is one way we can continue to improve our services. Please take a few minutes to complete the written survey that you may receive in the mail after your visit with us. Thank you!             Your Updated Medication List - Protect others around you: Learn how to safely use, store and throw away your medicines at www.disposemymeds.org.          This list is accurate as of: 1/10/18  5:13 PM.  Always use your most recent med list.                   Brand Name Dispense Instructions for use Diagnosis    escitalopram 20 MG tablet    LEXAPRO    90 tablet    Take 1 tablet (20 mg) by mouth daily    FIDELIA (generalized anxiety disorder)       hydrOXYzine 25 MG tablet    ATARAX    60 tablet    Take 1-2 tablets (25-50 mg) by mouth every 6 hours as needed for anxiety    FIDELIA (generalized anxiety disorder)       levonorgestrel 20 MCG/24HR IUD    MIRENA    1 each    1 each (20 mcg) by Intrauterine route once 7/8/2016

## 2018-01-10 NOTE — PATIENT INSTRUCTIONS
1. Routine general medical examination at a health care facility  Physical    2. Screen for STD (sexually transmitted disease)  Screen  - Wet prep  - NEISSERIA GONORRHOEA PCR  - CHLAMYDIA TRACHOMATIS PCR    3. Cervical cancer screening  Screen  - PAP imaged thin layer screen reflex to HPV if ASCUS - recommended age 25 - 29 years    4. Smoker  Chronic, stable  - Let me know if you feel like you want to use something to help you quit- info below.   QUITPLAN: 6-828-976-PLAN (2190)  Everyone in [place] has access to free telephone helpline support to quit tobacco either through their health plan or through QUITPLAN Services. The QUITPLAN web program is free to everyone regardless of coverage.   The [employer] Helpline, including gum, patches, lozenges, is free to the uninsured and those without coverage.     5. CARDIOVASCULAR SCREENING; LDL GOAL LESS THAN 160  Screen  - Lipid panel reflex to direct LDL Fasting; Future  - Schedule a lab only appointment anytime (fasting 8 hours)    Preventive Health Recommendations  Female Ages 26 - 39  Yearly exam:   See your health care provider every year in order to    Review health changes.     Discuss preventive care.      Review your medicines if you your doctor has prescribed any.    Until age 30: Get a Pap test every three years (more often if you have had an abnormal result).    After age 30: Talk to your doctor about whether you should have a Pap test every 3 years or have a Pap test with HPV screening every 5 years.   You do not need a Pap test if your uterus was removed (hysterectomy) and you have not had cancer.  You should be tested each year for STDs (sexually transmitted diseases), if you're at risk.   Talk to your provider about how often to have your cholesterol checked.  If you are at risk for diabetes, you should have a diabetes test (fasting glucose).  Shots: Get a flu shot each year. Get a tetanus shot every 10 years.   Nutrition:     Eat at least 5 servings of  fruits and vegetables each day.    Eat whole-grain bread, whole-wheat pasta and brown rice instead of white grains and rice.    Talk to your provider about Calcium and Vitamin D.     Lifestyle    Exercise at least 150 minutes a week (30 minutes a day, 5 days of the week). This will help you control your weight and prevent disease.    Limit alcohol to one drink per day.    No smoking.     Wear sunscreen to prevent skin cancer.    See your dentist every six months for an exam and cleaning.    HOW TO STOP SMOKING    Cigarette smoking can be a hard habit to break that s because:  Nicotine is physically addicting.  You actually feel a craving for cigarettes.  The force of habit is strong.  Smoking may seem a necessary part of your daily life.    Have you tried quitting already?  You re not alone.  If you re like most smokers, you:  would like to break the habit  have tried to quit at least once  must try to quit several times before you succeed.    Smoking harms your health in many ways.  It s no secret that smoking can lead to:  Respiratory Problems and many different types of cancer.    When you quit smoking, you ll feel better!  You ll breathe more easily, and have more stamina.  Smoker s cough should disappear.  Your sense of taste and smell will improve, and your digestion may improve  Risks will go down for both heart attack and cancer.  Your lungs will work better as harmful elements are cleaned out.    There are medications that can help you to quit smoking-ask your physician.    Consider nicotine replacement.  Your chances of quitting are much better with the nicotine patch or gum   and you don t need a prescription.    Get help.  The more support you get, the better your chances of quitting.  Talk to someone who supports your effort to quit.    Counseling and nicotine replacement together are more effective than either one alone.    Where can I get more information about quitting?  For information and referrals  to smoking cessation programs in you area, call:  [place] Quit plan helpline - 4-804-505-PLAN (1-646.687.6510)                    http://[email or web address]/[email or web address]  [place] Tobacco Quit Line - 8-202-114-STOP (1-804.172.1013)  American Cancer Society- 6-322-ENT-2345 ( 1-453.897.5645)  American Lung Association - 1-800-LUNG-USA (1-888.407.6479)  National Cancer [place] - 0-399-1-CANCER   (1-341.829.3911)    Don t let your health go up in smoke!  It is possible to quit!  Decide that you want to quit  Set a quit date and stick to it  Get support from your friends, family and healthcare provider        Genital Herpes    Genital herpes is a common sexually transmitted disease (STD). It is caused by the herpes simplex virus (HSV). One out of five teens and adults carry the herpes virus. During an outbreak, it causes small blisters that break open, leaving small, painful sores in the genital area. Eventually, scabs form and the sores heal. In women, these show up most often on the skin just outside the vaginal opening. They can occur on the buttocks, anus, or cervix. In men, the sores are usually on the tip, sides, or base of the penis. They also occur on the scrotum, buttocks, or thighs.  The first outbreak begins within 2 to 3 weeks after exposure to an infected sexual partner. It may last 1 to 3 weeks. It may cause headache, muscle ache, and fevers. The first outbreak is usually the worst. Because the virus remains in the body even after the sores heal, most people will have more outbreaks. The frequency of outbreaks is different for each person. Some people will never have another outbreak. Others will have several episodes a year. Later outbreaks are usually shorter, milder, and less painful. For many, the number of outbreaks tends to decrease over time. Various factors may trigger an outbreak. These include:    Emotional stress    Menstruation    Presence of another illness (cold, flu, or fever  from any cause)    Overexertion and fatigue    Weakened immune system  Home care    It is very important that you do not have sexual relations until all the herpes sores have healed completely.    Wash the affected area gently with mild soap and water. Wash your hands after touching the affected area.    You may use over-the-counter pain medicine unless another pain medicine was prescribed. (Note: If you have chronic liver or kidney disease or have ever had a stomach ulcer or gastrointestinal bleeding, talk with your healthcare provider before using these medicines. Also talk to your provider if you are taking medicine to prevent blood clots.) Aspirin should never be given to anyone younger than 18 years of age who is ill with a viral infection or fever. It may cause severe liver or brain damage.    Your healthcare provider may prescribe antiviral medicine during the first outbreak. This will help the sores heal faster. Antiviral medicine may also be prescribed so that you have it ready to take at the first sign of another outbreak. This will help the symptoms go away sooner. For people with frequent outbreaks, daily therapy may be prescribed. This will help reduce the frequency of attacks. Daily therapy may also reduce risk of spread of herpes to your sexual partner. Discuss the risks and benefits of daily therapy with your healthcare provider.    If you are a woman who is pregnant now or may become pregnant in the future, let your healthcare provider know that you have had herpes. This may affect the way your baby is delivered.  Preventing spread to others  The virus is spread by sexual contact with someone who has the herpes virus. The risk of spread is highest when the sores are present. However, there is a chance of spreading the virus even when sores are not visible. Inform future sexual partners that you have herpes and that they may become infected. To reduce the risk of passing the virus to a partner who  has never had herpes, avoid sexual relations at the first sign of an outbreak and until the sores are fully healed. Latex barriers, such as condoms, reduce the risk of spread between outbreaks if the infected site is covered, but they do not guarantee protection.  Follow-up care  Follow up with your healthcare provider, or as advised.  People who have just learned that they have herpes may feel upset. Getting the facts about herpes can help you feel more in control. Follow up with your healthcare provider or the public health department for complete STD screening, including HIV testing. For more information about herpes, visit the Centers for Disease Control (CDC) Genital Herpes website at: www.cdc.gov/std/Herpes/default.htm.  When to seek medical advice  Call your healthcare provider right away if any of these occur:    Inability to urinate due to pain    Swelling or increasing redness in the genital area    Unusual drowsiness, weakness, or confusion    Headache or stiff neck    Discharge from the vagina or penis    Increasing back or abdominal pain    Rash or joint pain  Date Last Reviewed: 9/25/2015 2000-2017 [employer]. [address] [place] [address]. All rights reserved. This information is not intended as a substitute for professional medical care. Always follow your healthcare professional's instructions.

## 2018-01-11 LAB
C TRACH DNA SPEC QL NAA+PROBE: NEGATIVE
N GONORRHOEA DNA SPEC QL NAA+PROBE: NEGATIVE
SPECIMEN SOURCE: NORMAL
SPECIMEN SOURCE: NORMAL

## 2018-01-11 NOTE — PROGRESS NOTES
Dear Jackie,  Gonorrhea negative  Chlamydia negative    Please contact our clinic via phone or My Chart if you have any questions or concerns.  Thanks,  ROBERT Mcduffie

## 2018-01-11 NOTE — PROGRESS NOTES
Dear Jackie,  Wet prep: negative for Trichomonas, clue cells or yeast  Other labs pending  Please contact our clinic via phone or My Chart if you have any questions or concerns.  Thanks,  ROBERT Mcduffie

## 2018-01-12 DIAGNOSIS — F41.1 GAD (GENERALIZED ANXIETY DISORDER): ICD-10-CM

## 2018-01-12 LAB
COPATH REPORT: NORMAL
PAP: NORMAL

## 2018-01-12 RX ORDER — ESCITALOPRAM OXALATE 20 MG/1
20 TABLET ORAL DAILY
Qty: 90 TABLET | Refills: 1 | Status: SHIPPED | OUTPATIENT
Start: 2018-01-12 | End: 2018-05-30

## 2018-01-12 NOTE — TELEPHONE ENCOUNTER
"Requested Prescriptions   Pending Prescriptions Disp Refills     escitalopram (LEXAPRO) 20 MG tablet [Pharmacy Med Name: ESCITALOPRAM 20MG TAB] 30 tablet 1     Sig: TAKE ONE-HALF TABLET BY MOUTH ONCE DAILY FOR  ONE  TO  TWO  WEEKS,  THEN  INCREASE  TO  ONE  TABLET  ONCE  DAILY    SSRIs Protocol Passed    1/12/2018  7:36 AM       Passed - Recent or future visit with authorizing provider    Patient had office visit in the last year or has a visit in the next 30 days with authorizing provider.  See \"Patient Info\" tab in inbasket, or \"Choose Columns\" in Meds & Orders section of the refill encounter.              Passed - Patient is age 18 or older       Passed - No active pregnancy on record       Passed - No positive pregnancy test in last 12 months        escitalopram (LEXAPRO) 20 MG tablet  Last Written Prescription Date:  12/18/2017  Last Fill Quantity: 90 tablet,  # refills: 1   Last Office Visit with G, P or [facility] prescribing provider:  01/10/2018   Future Office Visit:       PHQ-9 SCORE 1/14/2016 11/3/2017 12/18/2017   Total Score - - -   Total Score 2 5 4     FIDELIA-7 SCORE 11/3/2017 12/18/2017   Total Score 13 2       Zahida Stapleton RT (R) (M)    "

## 2018-02-14 ENCOUNTER — OFFICE VISIT (OUTPATIENT)
Dept: FAMILY MEDICINE | Facility: CLINIC | Age: 28
End: 2018-02-14
Payer: COMMERCIAL

## 2018-02-14 VITALS
DIASTOLIC BLOOD PRESSURE: 78 MMHG | SYSTOLIC BLOOD PRESSURE: 108 MMHG | WEIGHT: 129.6 LBS | RESPIRATION RATE: 16 BRPM | HEART RATE: 76 BPM | BODY MASS INDEX: 20.45 KG/M2 | TEMPERATURE: 97.9 F

## 2018-02-14 DIAGNOSIS — Z30.430 ENCOUNTER FOR INSERTION OF MIRENA IUD: ICD-10-CM

## 2018-02-14 DIAGNOSIS — N92.1 METRORRHAGIA: ICD-10-CM

## 2018-02-14 DIAGNOSIS — N93.8 DYSFUNCTIONAL UTERINE BLEEDING: Primary | ICD-10-CM

## 2018-02-14 PROCEDURE — 99213 OFFICE O/P EST LOW 20 MIN: CPT | Performed by: NURSE PRACTITIONER

## 2018-02-14 RX ORDER — ESTRADIOL 1 MG/1
1 TABLET ORAL DAILY
Qty: 21 TABLET | Refills: 0 | Status: SHIPPED | OUTPATIENT
Start: 2018-02-14 | End: 2018-04-09

## 2018-02-14 NOTE — NURSING NOTE
"Chief Complaint   Patient presents with     Abnormal Bleeding Problem       Initial /78  Pulse 76  Temp 97.9  F (36.6  C) (Tympanic)  Resp 16  Wt 129 lb 9.6 oz (58.8 kg)  BMI 20.45 kg/m2 Estimated body mass index is 20.45 kg/(m^2) as calculated from the following:    Height as of 1/10/18: 5' 6.75\" (1.695 m).    Weight as of this encounter: 129 lb 9.6 oz (58.8 kg).  Medication Reconciliation: complete    "

## 2018-02-14 NOTE — PATIENT INSTRUCTIONS
1. Dysfunctional uterine bleeding  Acute    2.  mirena IUD  Chronic, stable  - estradiol (ESTRACE) 1 MG tablet; Take 1 tablet (1 mg) by mouth daily  Dispense: 21 tablet; Refill: 0    3. Metrorrhagia  Acute  - estradiol (ESTRACE) 1 MG tablet; Take 1 tablet (1 mg) by mouth daily  Dispense: 21 tablet; Refill: 0  - Re-try Estradiol for 21 days  - Check with Dr. Falcon as far as long term thoughts about Mirena and long-term Estradiol

## 2018-02-14 NOTE — MR AVS SNAPSHOT
After Visit Summary   2/14/2018    Jackie Quispe    MRN: [id number]           Patient Information     Date Of Birth          1990        Visit Information        Provider Department      2/14/2018 4:00 PM Debbi Nam, CNP [facility]        Today's Diagnoses     Dysfunctional uterine bleeding    -  1     mirena IUD        Metrorrhagia          Care Instructions    1. Dysfunctional uterine bleeding  Acute    2.  mirena IUD  Chronic, stable  - estradiol (ESTRACE) 1 MG tablet; Take 1 tablet (1 mg) by mouth daily  Dispense: 21 tablet; Refill: 0    3. Metrorrhagia  Acute  - estradiol (ESTRACE) 1 MG tablet; Take 1 tablet (1 mg) by mouth daily  Dispense: 21 tablet; Refill: 0  - Re-try Estradiol for 21 days  - Check with Dr. Falcon as far as long term thoughts about Mirena and long-term Estradiol          Follow-ups after your visit        Who to contact     If you have questions or need follow up information about today's clinic visit or your schedule please contact [facility] directly at [phone].  Normal or non-critical lab and imaging results will be communicated to you by Golfsmithhart, letter or phone within 4 business days after the clinic has received the results. If you do not hear from us within 7 days, please contact the clinic through Scanntecht or phone. If you have a critical or abnormal lab result, we will notify you by phone as soon as possible.  Submit refill requests through Wowcracy or call your pharmacy and they will forward the refill request to us. Please allow 3 business days for your refill to be completed.          Additional Information About Your Visit        MyChart Information     Wowcracy gives you secure access to your electronic health record. If you see a primary care provider, you can also send messages to your care team and make appointments. If you have questions, please call your primary care clinic.  If you do not have a primary  care provider, please call [phone] and they will assist you.        Care EveryWhere ID     This is your Care EveryWhere ID. This could be used by other organizations to access your [facility] medical records  [id number]        Your Vitals Were     Pulse Temperature Respirations BMI (Body Mass Index)          76 97.9  F (36.6  C) (Tympanic) 16 20.45 kg/m2         Blood Pressure from Last 3 Encounters:   02/14/18 108/78   01/10/18 126/72   12/18/17 115/77    Weight from Last 3 Encounters:   02/14/18 129 lb 9.6 oz (58.8 kg)   01/10/18 126 lb (57.2 kg)   12/18/17 129 lb (58.5 kg)              Today, you had the following     No orders found for display         Today's Medication Changes          These changes are accurate as of 2/14/18  4:26 PM.  If you have any questions, ask your nurse or doctor.               Start taking these medicines.        Dose/Directions    estradiol 1 MG tablet   Commonly known as:  ESTRACE   Used for:  Encounter for insertion of mirena IUD, Metrorrhagia   Started by:  Debbi Nam CNP        Dose:  1 mg   Take 1 tablet (1 mg) by mouth daily   Quantity:  21 tablet   Refills:  0            Where to get your medicines      These medications were sent to [facility] [address] [place] [address]  [address] [facility] [address]     Phone:  [phone]     estradiol 1 MG tablet                Primary Care Provider Office Phone # Fax #    Tammy Beth [phone] [address] [facility] [address]        Equal Access to Services     [facility]: Hadii daniel shabazz Somontrell, waaxda luqadaha, qaybta kaalsunni hull. So [facility] [phone].    ATENCIÓN: Si habla español, tiene a dang disposición servicios gratuitos de asistencia lingüística. Llame al [phone].    We comply with applicable federal civil rights laws and [place] laws. We do not discriminate on the basis of race, color,  national origin, age, disability, sex, sexual orientation, or gender identity.            Thank you!     Thank you for choosing [facility]  for your care. Our goal is always to provide you with excellent care. Hearing back from our patients is one way we can continue to improve our services. Please take a few minutes to complete the written survey that you may receive in the mail after your visit with us. Thank you!             Your Updated Medication List - Protect others around you: Learn how to safely use, store and throw away your medicines at www.disposemymeds.org.          This list is accurate as of 2/14/18  4:26 PM.  Always use your most recent med list.                   Brand Name Dispense Instructions for use Diagnosis    * escitalopram 20 MG tablet    LEXAPRO    90 tablet    Take 1 tablet (20 mg) by mouth daily    FIDELIA (generalized anxiety disorder)       * escitalopram 20 MG tablet    LEXAPRO    90 tablet    Take 1 tablet (20 mg) by mouth daily    FIDELIA (generalized anxiety disorder)       estradiol 1 MG tablet    ESTRACE    21 tablet    Take 1 tablet (1 mg) by mouth daily    Encounter for insertion of mirena IUD, Metrorrhagia       hydrOXYzine 25 MG tablet    ATARAX    60 tablet    Take 1-2 tablets (25-50 mg) by mouth every 6 hours as needed for anxiety    FIDELIA (generalized anxiety disorder)       levonorgestrel 20 MCG/24HR IUD    MIRENA    1 each    1 each (20 mcg) by Intrauterine route once 7/8/2016        * Notice:  This list has 2 medication(s) that are the same as other medications prescribed for you. Read the directions carefully, and ask your doctor or other care provider to review them with you.

## 2018-02-14 NOTE — PROGRESS NOTES
"  SUBJECTIVE:   Jackie Quispe is a 28 year old female who presents to clinic today for the following health issues:    27 yo female seen in clinic today because of irregular periods. Patient currently has an IUD in place. She states she has had it since 3/2016. She states she had a period for about 6 months straight right away. She went to OB/GYN and they put her on estrogen and that eventually leveled out her DUB til about 2 months ago. Patient said she has had a period now for 2 months. It's more then spotting but not super heavy. Some cramping but not horrible. No other symptoms that she can think of. Patient  Notes \"I'm just sick of always having a period\". She denies dizziness, fatigue.     8/2/2016 OBGYN: Mirena IUD 7/8/2016. Strings were in place.   HPI:   PCP:  Tammy Beth [phone]    Patient Active Problem List   Diagnosis     Smoker     Acne      mirena IUD     Dysfunctional uterine bleeding     FIDELIA (generalized anxiety disorder)     CARDIOVASCULAR SCREENING; LDL GOAL LESS THAN 160     Current Outpatient Prescriptions   Medication     estradiol (ESTRACE) 1 MG tablet     levonorgestrel (MIRENA) 20 MCG/24HR IUD     escitalopram (LEXAPRO) 20 MG tablet     escitalopram (LEXAPRO) 20 MG tablet     hydrOXYzine (ATARAX) 25 MG tablet     No current facility-administered medications for this visit.        Health Maintenance Due   Topic Date Due     INFLUENZA VACCINE (SYSTEM ASSIGNED)  09/01/2017       Reviewed and updated:  Tobacco  Allergies  Meds  Med Hx  Surg Hx  Fam Hx  Soc Hx     ROS:  Constitutional, HEENT, cardiovascular, pulmonary, gi and gu systems are negative, except as otherwise noted.    PHYSICAL EXAM:   /78  Pulse 76  Temp 97.9  F (36.6  C) (Tympanic)  Resp 16  Wt 129 lb 9.6 oz (58.8 kg)  BMI 20.45 kg/m2  Body mass index is 20.45 kg/(m^2).  GENERAL APPEARANCE: healthy, alert and no distress  RESP: lungs clear to auscultation - no rales, rhonchi or wheezes  CV: regular " rates and rhythm, normal S1 S2, no S3 or S4 and no murmur, click or rub  PSYCH: mentation appears normal and affect normal/bright    ASSESSMENT & PLAN:   1. Dysfunctional uterine bleeding  Acute    2.  mirena IUD  Chronic, stable  - estradiol (ESTRACE) 1 MG tablet; Take 1 tablet (1 mg) by mouth daily  Dispense: 21 tablet; Refill: 0    3. Metrorrhagia  Acute  - estradiol (ESTRACE) 1 MG tablet; Take 1 tablet (1 mg) by mouth daily  Dispense: 21 tablet; Refill: 0  - Re-try Estradiol for 21 days  - Check with Dr. Falcon as far as long term thoughts about Mirena and long-term Estradiol    Risks, benefits, side effects and rationale for treatment plan fully discussed with the patient and understanding expressed.    [facility]

## 2018-02-16 ENCOUNTER — OFFICE VISIT (OUTPATIENT)
Dept: FAMILY MEDICINE | Facility: CLINIC | Age: 28
End: 2018-02-16
Payer: COMMERCIAL

## 2018-02-16 VITALS
HEART RATE: 77 BPM | BODY MASS INDEX: 20.29 KG/M2 | WEIGHT: 128.6 LBS | DIASTOLIC BLOOD PRESSURE: 71 MMHG | RESPIRATION RATE: 14 BRPM | TEMPERATURE: 96.5 F | SYSTOLIC BLOOD PRESSURE: 110 MMHG

## 2018-02-16 DIAGNOSIS — T43.226A: ICD-10-CM

## 2018-02-16 DIAGNOSIS — N92.6 IRREGULAR MENSES: Primary | ICD-10-CM

## 2018-02-16 LAB — HGB BLD-MCNC: 13.1 G/DL (ref 11.7–15.7)

## 2018-02-16 PROCEDURE — 85018 HEMOGLOBIN: CPT | Performed by: NURSE PRACTITIONER

## 2018-02-16 PROCEDURE — 36415 COLL VENOUS BLD VENIPUNCTURE: CPT | Performed by: NURSE PRACTITIONER

## 2018-02-16 PROCEDURE — 99213 OFFICE O/P EST LOW 20 MIN: CPT | Performed by: NURSE PRACTITIONER

## 2018-02-16 NOTE — NURSING NOTE
"Chief Complaint   Patient presents with     Dizziness     Nausea       Initial /71 (BP Location: Right arm, Cuff Size: Adult Regular)  Pulse 77  Temp 96.5  F (35.8  C) (Tympanic)  Resp 14  Wt 128 lb 9.6 oz (58.3 kg)  BMI 20.29 kg/m2 Estimated body mass index is 20.29 kg/(m^2) as calculated from the following:    Height as of 1/10/18: 5' 6.75\" (1.695 m).    Weight as of this encounter: 128 lb 9.6 oz (58.3 kg).      Health Maintenance that is potentially due pending provider review:  NONE    n/a    Is there anyone who you would like to be able to receive your results? Not Applicable  If yes have patient fill out JESSICA    .Franky Smith M.A.    "

## 2018-02-16 NOTE — MR AVS SNAPSHOT
After Visit Summary   2/16/2018    Jackie Quispe    MRN: [id number]           Patient Information     Date Of Birth          1990        Visit Information        Provider Department      2/16/2018 2:20 PM Cele Martin APRN [facility]        Today's Diagnoses     Irregular menses    -  1    Underdosing of selective serotonin reuptake inhibitor (SSRI), initial encounter          Care Instructions    Restart you lexapro and set up appointment with your primary care provider to taper it or try another medication.    Follow-up with your primary care provider next week and as needed.    Indications for emergent return to emergency department discussed with patient, who verbalized good understanding and agreement.  Patient understands the limitations of today's evaluation.         Selective Serotonin Reuptake Inhibitors  Your healthcare provider prescribed a selective serotonin reuptake inhibitor (SSRI) for you. An SSRI is an antidepressant. SSRIs help reduce the extreme sadness, hopelessness, and lack of interest in life that are typical in people with depression. SSRIs are also used to treat panic disorder and obsessive compulsive disorder (OCD).     The name of my SSRI is ____________________________________________.   Guidelines for use    Follow the fact sheet that came with your medicine. It tells you when and how to take your medicine. Ask for a sheet if you didn t get one.    Before starting your medicine, tell your healthcare provider if you have:    Manic depression or bipolar disorder    Kidney disease    Thyroid disease    Diabetes    Liver disease    Seizure disorders    A history or current problem with drug abuse or dependence    Tell your healthcare provider about any other medicines you are taking. This includes over-the-counter or herbal medicines.    Take your medicine exactly as directed. This medicine takes several weeks to reach its full effect.  Because of this, it is important to take this medicine every day, even if you believe that it is not helping your symptoms. You may need to take this medicine for a few months. Or you may need to take it for the rest of your life. It depends on your symptoms.    If you miss a dose, take it as soon as you remember--unless it s almost time for your next dose. In that case, skip the dose you missed. Don t take a double dose.    Take your medicine with food.    Limit alcohol intake while taking this medicine. Or if possible, don t have any alcohol at all while taking this medicine.    Don t take an SSRI if you are currently taking a monoamine oxidase inhibitor (MAO inhibitor).    Don t stop taking your medicine without talking with your healthcare provider. If you wish to stop taking your SSRI, your healthcare provider will need to help you reduce the medicine slowly.    Before using new over-the-counter medicines, check with the pharmacist to be sure it will not interact with the SSRI.    Don t share your medicine or use another person's medicine, even if it is the same medicine and dose. Check with your provider if you have trouble affording your prescription.  Possible side effects  Tell your healthcare provider if you have any of these side effects. Don t stop taking the medicine until your healthcare provider tells you to. Mild side effects include:    Anxiety    Trouble sleeping    Nausea    Diarrhea    Headaches    Loss of sex drive or problems with orgasm    Sweating     When to call your healthcare provider  Call your healthcare provider right away if you have any of the following:    Increased feelings of depression    Unusual joint or muscle pain    Trouble breathing    Shaking chills    Excessive excitement    Trouble controlling your emotions or actions    Skin rash    Hives    Tremors   Date Last Reviewed: 5/1/2017 2000-2017 [employer]. [address] [place] [address]. All  rights reserved. This information is not intended as a substitute for professional medical care. Always follow your healthcare professional's instructions.                Follow-ups after your visit        Follow-up notes from your care team     See patient instructions section of the AVS Return in about 3 days (around 2/19/2018) for Follow up with your primary care provider.      Your next 10 appointments already scheduled     Mar 08, 2018  3:15 PM CST   [facility]    [address] [facility] [address] [phone]           Bring a current list of meds and any records pertaining to this visit. For Physicals, please bring immunization records and any forms needing to be filled out. Please arrive 10 minutes early to complete paperwork.              Who to contact     If you have questions or need follow up information about today's clinic visit or your schedule please contact [facility] directly at [phone].  Normal or non-critical lab and imaging results will be communicated to you by EZ-Appshart, letter or phone within 4 business days after the clinic has received the results. If you do not hear from us within 7 days, please contact the clinic through Medypalt or phone. If you have a critical or abnormal lab result, we will notify you by phone as soon as possible.  Submit refill requests through LabDoor or call your pharmacy and they will forward the refill request to us. Please allow 3 business days for your refill to be completed.          Additional Information About Your Visit        MyChart Information     LabDoor gives you secure access to your electronic health record. If you see a primary care provider, you can also send messages to your care team and make appointments. If you have questions, please call your primary care clinic.  If you do not have a primary care provider, please call  [phone] and they will assist you.        Care EveryWhere ID     This is your Care EveryWhere ID. This could be used by other organizations to access your [facility] medical records  [id number]        Your Vitals Were     Pulse Temperature Respirations BMI (Body Mass Index)          77 96.5  F (35.8  C) (Tympanic) 14 20.29 kg/m2         Blood Pressure from Last 3 Encounters:   02/16/18 110/71   02/14/18 108/78   01/10/18 126/72    Weight from Last 3 Encounters:   02/16/18 128 lb 9.6 oz (58.3 kg)   02/14/18 129 lb 9.6 oz (58.8 kg)   01/10/18 126 lb (57.2 kg)              We Performed the Following     Hemoglobin        Primary Care Provider Office Phone # Fax #    Tammy Bteh [phone] [address] [facility] [address]        Equal Access to Services     DILMA QUINONES : Hadii aad ku hadasho Somontrell, waaxda luqadaha, qaybta kaalmada adeegyada, sunni charlton . So [facility] [phone].    ATENCIÓN: Si habla español, tiene a dang disposición servicios gratuitos de asistencia lingüística. Llame al [phone].    We comply with applicable federal civil rights laws and [place] laws. We do not discriminate on the basis of race, color, national origin, age, disability, sex, sexual orientation, or gender identity.            Thank you!     Thank you for choosing [facility]  for your care. Our goal is always to provide you with excellent care. Hearing back from our patients is one way we can continue to improve our services. Please take a few minutes to complete the written survey that you may receive in the mail after your visit with us. Thank you!             Your Updated Medication List - Protect others around you: Learn how to safely use, store and throw away your medicines at www.disposemymeds.org.          This list is accurate as of 2/16/18  3:16 PM.  Always use your most recent med list.                   Brand Name Dispense Instructions  for use Diagnosis    * escitalopram 20 MG tablet    LEXAPRO    90 tablet    Take 1 tablet (20 mg) by mouth daily    FIDELIA (generalized anxiety disorder)       * escitalopram 20 MG tablet    LEXAPRO    90 tablet    Take 1 tablet (20 mg) by mouth daily    FIDELIA (generalized anxiety disorder)       estradiol 1 MG tablet    ESTRACE    21 tablet    Take 1 tablet (1 mg) by mouth daily    Encounter for insertion of mirena IUD, Metrorrhagia       hydrOXYzine 25 MG tablet    ATARAX    60 tablet    Take 1-2 tablets (25-50 mg) by mouth every 6 hours as needed for anxiety    FIDELIA (generalized anxiety disorder)       levonorgestrel 20 MCG/24HR IUD    MIRENA    1 each    1 each (20 mcg) by Intrauterine route once 7/8/2016        * Notice:  This list has 2 medication(s) that are the same as other medications prescribed for you. Read the directions carefully, and ask your doctor or other care provider to review them with you.

## 2018-02-16 NOTE — PATIENT INSTRUCTIONS
Restart you lexapro and set up appointment with your primary care provider to taper it or try another medication.    Follow-up with your primary care provider next week and as needed.    Indications for emergent return to emergency department discussed with patient, who verbalized good understanding and agreement.  Patient understands the limitations of today's evaluation.         Selective Serotonin Reuptake Inhibitors  Your healthcare provider prescribed a selective serotonin reuptake inhibitor (SSRI) for you. An SSRI is an antidepressant. SSRIs help reduce the extreme sadness, hopelessness, and lack of interest in life that are typical in people with depression. SSRIs are also used to treat panic disorder and obsessive compulsive disorder (OCD).     The name of my SSRI is ____________________________________________.   Guidelines for use    Follow the fact sheet that came with your medicine. It tells you when and how to take your medicine. Ask for a sheet if you didn t get one.    Before starting your medicine, tell your healthcare provider if you have:    Manic depression or bipolar disorder    Kidney disease    Thyroid disease    Diabetes    Liver disease    Seizure disorders    A history or current problem with drug abuse or dependence    Tell your healthcare provider about any other medicines you are taking. This includes over-the-counter or herbal medicines.    Take your medicine exactly as directed. This medicine takes several weeks to reach its full effect. Because of this, it is important to take this medicine every day, even if you believe that it is not helping your symptoms. You may need to take this medicine for a few months. Or you may need to take it for the rest of your life. It depends on your symptoms.    If you miss a dose, take it as soon as you remember--unless it s almost time for your next dose. In that case, skip the dose you missed. Don t take a double dose.    Take your medicine with  food.    Limit alcohol intake while taking this medicine. Or if possible, don t have any alcohol at all while taking this medicine.    Don t take an SSRI if you are currently taking a monoamine oxidase inhibitor (MAO inhibitor).    Don t stop taking your medicine without talking with your healthcare provider. If you wish to stop taking your SSRI, your healthcare provider will need to help you reduce the medicine slowly.    Before using new over-the-counter medicines, check with the pharmacist to be sure it will not interact with the SSRI.    Don t share your medicine or use another person's medicine, even if it is the same medicine and dose. Check with your provider if you have trouble affording your prescription.  Possible side effects  Tell your healthcare provider if you have any of these side effects. Don t stop taking the medicine until your healthcare provider tells you to. Mild side effects include:    Anxiety    Trouble sleeping    Nausea    Diarrhea    Headaches    Loss of sex drive or problems with orgasm    Sweating     When to call your healthcare provider  Call your healthcare provider right away if you have any of the following:    Increased feelings of depression    Unusual joint or muscle pain    Trouble breathing    Shaking chills    Excessive excitement    Trouble controlling your emotions or actions    Skin rash    Hives    Tremors   Date Last Reviewed: 5/1/2017 2000-2017 [employer]. [address] [place] [address], [place] [address]. All rights reserved. This information is not intended as a substitute for professional medical care. Always follow your healthcare professional's instructions.

## 2018-02-16 NOTE — PROGRESS NOTES
SUBJECTIVE:   Jackie Quispe is a 28 year old female who presents to clinic today for the following health issues:    Dizziness  Onset: 2 days    Description:   Do you feel faint:  YES  Does it feel like the surroundings (bed, room) are moving: no   Unsteady/off balance: YES  Have you passed out or fallen: no     Intensity: mild    Progression of Symptoms:  same    Accompanying Signs & Symptoms:  Heart palpitations: no   Nausea, vomiting: YES- nasuea  Weakness in arms or legs: YES- weakness  Fatigue: YES  Vision or speech changes: no   Ringing in ears (Tinnitus): no   Hearing Loss: no     History:   Head trauma/concussion hx: no   Previous similar symptoms: no   Recent bleeding history: no     Precipitating factors:   Worse with activity or head movement: YES  Any new medications (BP?): no   Alcohol/drug abuse/withdrawal: no   Did stop anxiety medication 7 days ago- Lexapro 20 mg daily     Alleviating factors:   Does staying in a fixed position give relief:  YES    Therapies Tried and outcome: NA         Problem list and histories reviewed & adjusted, as indicated.  Additional history: as documented    Patient Active Problem List   Diagnosis     Smoker     Acne      mirena IUD     Dysfunctional uterine bleeding     FIDELIA (generalized anxiety disorder)     CARDIOVASCULAR SCREENING; LDL GOAL LESS THAN 160     Past Surgical History:   Procedure Laterality Date     MOUTH SURGERY      wisdom teeth       Social History   Substance Use Topics     Smoking status: Current Every Day Smoker     Packs/day: 0.50     Types: Cigarettes     Smokeless tobacco: Never Used      Comment: smoking  10 cig/day     Alcohol use Yes      Comment: Occas- quit with pregnacny     Family History   Problem Relation Age of Onset     Alcohol/Drug Maternal Grandmother      alcohol     Hypertension Paternal Grandfather      CANCER Paternal Grandfather      HEART DISEASE Paternal Grandfather      CANCER Maternal Grandfather      lung     Alzheimer  Disease Paternal Grandmother      Family History Negative Mother      Cardiovascular Father 54     MI     HEART DISEASE Father          Current Outpatient Prescriptions   Medication Sig Dispense Refill     estradiol (ESTRACE) 1 MG tablet Take 1 tablet (1 mg) by mouth daily 21 tablet 0     levonorgestrel (MIRENA) 20 MCG/24HR IUD 1 each (20 mcg) by Intrauterine route once 7/8/2016 1 each 0     escitalopram (LEXAPRO) 20 MG tablet Take 1 tablet (20 mg) by mouth daily (Patient not taking: Reported on 2/14/2018) 90 tablet 1     escitalopram (LEXAPRO) 20 MG tablet Take 1 tablet (20 mg) by mouth daily (Patient not taking: Reported on 2/14/2018) 90 tablet 1     hydrOXYzine (ATARAX) 25 MG tablet Take 1-2 tablets (25-50 mg) by mouth every 6 hours as needed for anxiety (Patient not taking: Reported on 2/14/2018) 60 tablet 1     No Known Allergies  Labs reviewed in EPIC    Reviewed and updated as needed this visit by clinical staff  Tobacco  Allergies  Meds  Problems  Med Hx  Surg Hx  Fam Hx  Soc Hx        Reviewed and updated as needed this visit by Provider  Allergies  Meds  Problems         ROS:  Constitutional, HEENT, cardiovascular, pulmonary, GI, , musculoskeletal, neuro, skin, endocrine and psych systems are negative, except as otherwise noted.    OBJECTIVE:     /71 (BP Location: Right arm, Cuff Size: Adult Regular)  Pulse 77  Temp 96.5  F (35.8  C) (Tympanic)  Resp 14  Wt 128 lb 9.6 oz (58.3 kg)  BMI 20.29 kg/m2  Body mass index is 20.29 kg/(m^2).   GENERAL: healthy, alert and no distress, nontoxic in appearance  EYES: Eyes grossly normal to inspection, PERRL and conjunctivae and sclerae normal  HENT: ear canals and TM's normal, nose and mouth without ulcers or lesions  NECK: no adenopathy, supple with full ROM  RESP: lungs clear to auscultation - no rales, rhonchi or wheezes  CV: regular rate and rhythm, normal S1 S2, no S3 or S4, no murmur, click or rub, no peripheral edema and peripheral pulses  strong  ABDOMEN: soft, nontender, no hepatosplenomegaly, no masses and bowel sounds normal  MS: no gross musculoskeletal defects noted, no edema  No rash  CN 2-12 intact    Diagnostic Test Results:  No results found for this or any previous visit (from the past 24 hour(s)).    ASSESSMENT/PLAN:   Pt is to restart her lexapro as this is most likely withdrawal from abrupt discontinuation of lexapro by patient. She is to see her PCP for a plan to wean slowly or change meds.  Problem List Items Addressed This Visit     None      Visit Diagnoses     Irregular menses    -  Primary    Relevant Orders    Hemoglobin (Completed)    Underdosing of selective serotonin reuptake inhibitor (SSRI), initial encounter                   Patient Instructions     Restart you lexapro and set up appointment with your primary care provider to taper it or try another medication.    Follow-up with your primary care provider next week and as needed.    Indications for emergent return to emergency department discussed with patient, who verbalized good understanding and agreement.  Patient understands the limitations of today's evaluation.         Selective Serotonin Reuptake Inhibitors  Your healthcare provider prescribed a selective serotonin reuptake inhibitor (SSRI) for you. An SSRI is an antidepressant. SSRIs help reduce the extreme sadness, hopelessness, and lack of interest in life that are typical in people with depression. SSRIs are also used to treat panic disorder and obsessive compulsive disorder (OCD).     The name of my SSRI is ____________________________________________.   Guidelines for use    Follow the fact sheet that came with your medicine. It tells you when and how to take your medicine. Ask for a sheet if you didn t get one.    Before starting your medicine, tell your healthcare provider if you have:    Manic depression or bipolar disorder    Kidney disease    Thyroid disease    Diabetes    Liver disease    Seizure  disorders    A history or current problem with drug abuse or dependence    Tell your healthcare provider about any other medicines you are taking. This includes over-the-counter or herbal medicines.    Take your medicine exactly as directed. This medicine takes several weeks to reach its full effect. Because of this, it is important to take this medicine every day, even if you believe that it is not helping your symptoms. You may need to take this medicine for a few months. Or you may need to take it for the rest of your life. It depends on your symptoms.    If you miss a dose, take it as soon as you remember--unless it s almost time for your next dose. In that case, skip the dose you missed. Don t take a double dose.    Take your medicine with food.    Limit alcohol intake while taking this medicine. Or if possible, don t have any alcohol at all while taking this medicine.    Don t take an SSRI if you are currently taking a monoamine oxidase inhibitor (MAO inhibitor).    Don t stop taking your medicine without talking with your healthcare provider. If you wish to stop taking your SSRI, your healthcare provider will need to help you reduce the medicine slowly.    Before using new over-the-counter medicines, check with the pharmacist to be sure it will not interact with the SSRI.    Don t share your medicine or use another person's medicine, even if it is the same medicine and dose. Check with your provider if you have trouble affording your prescription.  Possible side effects  Tell your healthcare provider if you have any of these side effects. Don t stop taking the medicine until your healthcare provider tells you to. Mild side effects include:    Anxiety    Trouble sleeping    Nausea    Diarrhea    Headaches    Loss of sex drive or problems with orgasm    Sweating     When to call your healthcare provider  Call your healthcare provider right away if you have any of the following:    Increased feelings of  depression    Unusual joint or muscle pain    Trouble breathing    Shaking chills    Excessive excitement    Trouble controlling your emotions or actions    Skin rash    Hives    Tremors   Date Last Reviewed: 5/1/2017 2000-2017 [employer]. [address] [place] [address], [place] [address]. All rights reserved. This information is not intended as a substitute for professional medical care. Always follow your healthcare professional's instructions.            CADY Perez [facility]

## 2018-03-02 ENCOUNTER — OFFICE VISIT (OUTPATIENT)
Dept: OBGYN | Facility: CLINIC | Age: 28
End: 2018-03-02
Payer: COMMERCIAL

## 2018-03-02 VITALS
WEIGHT: 129.4 LBS | SYSTOLIC BLOOD PRESSURE: 109 MMHG | HEART RATE: 71 BPM | RESPIRATION RATE: 16 BRPM | TEMPERATURE: 97.9 F | DIASTOLIC BLOOD PRESSURE: 65 MMHG | HEIGHT: 67 IN | BODY MASS INDEX: 20.31 KG/M2

## 2018-03-02 DIAGNOSIS — Z30.430 ENCOUNTER FOR INSERTION OF MIRENA IUD: ICD-10-CM

## 2018-03-02 DIAGNOSIS — F41.1 GAD (GENERALIZED ANXIETY DISORDER): ICD-10-CM

## 2018-03-02 DIAGNOSIS — N93.8 DYSFUNCTIONAL UTERINE BLEEDING: Primary | ICD-10-CM

## 2018-03-02 LAB — HCG UR QL: NEGATIVE

## 2018-03-02 PROCEDURE — 99213 OFFICE O/P EST LOW 20 MIN: CPT | Mod: 25 | Performed by: OBSTETRICS & GYNECOLOGY

## 2018-03-02 PROCEDURE — 58301 REMOVE INTRAUTERINE DEVICE: CPT | Performed by: OBSTETRICS & GYNECOLOGY

## 2018-03-02 PROCEDURE — 81025 URINE PREGNANCY TEST: CPT | Performed by: OBSTETRICS & GYNECOLOGY

## 2018-03-02 PROCEDURE — 58300 INSERT INTRAUTERINE DEVICE: CPT | Performed by: OBSTETRICS & GYNECOLOGY

## 2018-03-02 RX ORDER — ESCITALOPRAM OXALATE 10 MG/1
10 TABLET ORAL DAILY
Qty: 90 TABLET | Refills: 0 | Status: SHIPPED | OUTPATIENT
Start: 2018-03-02 | End: 2018-05-30

## 2018-03-02 NOTE — MR AVS SNAPSHOT
"              After Visit Summary   3/2/2018    Jackie Quispe    MRN: [id number]           Patient Information     Date Of Birth          1990        Visit Information        Provider Department      3/2/2018 1:30 PM Tammy Beth MD [facility]        Today's Diagnoses     Dysfunctional uterine bleeding    -  1    FIDELIA (generalized anxiety disorder)        Mirena IUD- remove by 3/2022           Follow-ups after your visit        Who to contact     If you have questions or need follow up information about today's clinic visit or your schedule please contact [facility] directly at [phone].  Normal or non-critical lab and imaging results will be communicated to you by MyChart, letter or phone within 4 business days after the clinic has received the results. If you do not hear from us within 7 days, please contact the clinic through [employer] or phone. If you have a critical or abnormal lab result, we will notify you by phone as soon as possible.  Submit refill requests through [employer] or call your pharmacy and they will forward the refill request to us. Please allow 3 business days for your refill to be completed.          Additional Information About Your Visit        MyChart Information     [employer] gives you secure access to your electronic health record. If you see a primary care provider, you can also send messages to your care team and make appointments. If you have questions, please call your primary care clinic.  If you do not have a primary care provider, please call [phone] and they will assist you.        Care EveryWhere ID     This is your Care EveryWhere ID. This could be used by other organizations to access your [place] medical records  [id number]        Your Vitals Were     Pulse Temperature Respirations Height BMI (Body Mass Index)       71 97.9  F (36.6  C) (Tympanic) 16 5' 6.75\" (1.695 m) 20.42 kg/m2        Blood Pressure from Last 3 Encounters: "   03/02/18 109/65   02/16/18 110/71   02/14/18 108/78    Weight from Last 3 Encounters:   03/02/18 129 lb 6.4 oz (58.7 kg)   02/16/18 128 lb 9.6 oz (58.3 kg)   02/14/18 129 lb 9.6 oz (58.8 kg)              We Performed the Following     HC LEVONORGESTREL IU 52MG 5 YR     HCG Qual, Urine - CSC and Range ([id number])     INSERTION INTRAUTERINE DEVICE     REMOVE INTRAUTERINE DEVICE          Today's Medication Changes          These changes are accurate as of 3/2/18  3:21 PM.  If you have any questions, ask your nurse or doctor.               These medicines have changed or have updated prescriptions.        Dose/Directions    * escitalopram 20 MG tablet   Commonly known as:  LEXAPRO   This may have changed:  Another medication with the same name was changed. Make sure you understand how and when to take each.   Used for:  FIDELIA (generalized anxiety disorder)   Changed by:  Tammy Beth MD        Dose:  20 mg   Take 1 tablet (20 mg) by mouth daily   Quantity:  90 tablet   Refills:  1       * escitalopram 10 MG tablet   Commonly known as:  LEXAPRO   This may have changed:    - medication strength  - how much to take   Used for:  FIDELIA (generalized anxiety disorder)   Changed by:  Tammy Beth MD        Dose:  10 mg   Take 1 tablet (10 mg) by mouth daily   Quantity:  90 tablet   Refills:  0       * Notice:  This list has 2 medication(s) that are the same as other medications prescribed for you. Read the directions carefully, and ask your doctor or other care provider to review them with you.         Where to get your medicines      These medications were sent to [facility] [address] [place] [address] [address] [place] [address]  [address] [facility] [address]     Phone:  [phone]     escitalopram 10 MG tablet                Primary Care Provider Office Phone # Fax #    Tammy Beth [phone] [address] [facility] [address]        Equal Access to Services     DILMA QUINONES AH: Nikole sanchez  joanna Rodríguez, waenzoda luqadaha, qaybta kaalmada eliel, sunni rylandin hayaawali branhammarino aliciafrank laLorinnereida rosa. [place] [phone].    ATENCIÓN: Si raf chandler, tiene a dang disposición servicios gratuitos de asistencia lingüística. Leonid al [phone].    We comply with applicable federal civil rights laws and [place] laws. We do not discriminate on the basis of race, color, national origin, age, disability, sex, sexual orientation, or gender identity.            Thank you!     Thank you for choosing [facility]  for your care. Our goal is always to provide you with excellent care. Hearing back from our patients is one way we can continue to improve our services. Please take a few minutes to complete the written survey that you may receive in the mail after your visit with us. Thank you!             Your Updated Medication List - Protect others around you: Learn how to safely use, store and throw away your medicines at www.disposemymeds.org.          This list is accurate as of 3/2/18  3:21 PM.  Always use your most recent med list.                   Brand Name Dispense Instructions for use Diagnosis    * escitalopram 20 MG tablet    LEXAPRO    90 tablet    Take 1 tablet (20 mg) by mouth daily    FIDELIA (generalized anxiety disorder)       * escitalopram 10 MG tablet    LEXAPRO    90 tablet    Take 1 tablet (10 mg) by mouth daily    FIDELIA (generalized anxiety disorder)       estradiol 1 MG tablet    ESTRACE    21 tablet    Take 1 tablet (1 mg) by mouth daily    Encounter for insertion of mirena IUD, Metrorrhagia       hydrOXYzine 25 MG tablet    ATARAX    60 tablet    Take 1-2 tablets (25-50 mg) by mouth every 6 hours as needed for anxiety    FIDELIA (generalized anxiety disorder)       levonorgestrel 20 MCG/24HR IUD    MIRENA    1 each    1 each (20 mcg) by Intrauterine route once 7/8/2016        * Notice:  This list has 2 medication(s) that are the same as other medications prescribed for you. Read the  directions carefully, and ask your doctor or other care provider to review them with you.

## 2018-03-02 NOTE — PROGRESS NOTES
"Jackie is a 28 year old  here for follow up of irregular bleeding with Mirena.  Had placed in 2016.  Had 6 months of bleeding immediately after placement.  She tried estrace to stabilize the bleeding.  Had done well until recently when she began having regular daily bleeding since end of December.  Intermittently needing a tampon where she has to change it a couple of times a day.   Had testing for GC/Chl that was negative.  Urine pregnancy test is negative.      Patient is open to the idea of replacing the current device in the event that it is a device defect.  Overall she likes the convenience, but she is irritated by the irregular bleeding.     ROS: Ten point review of systems was reviewed and negative except the above.    PMH: Her past medical, surgical, and obstetric histories were reviewed and are documented in their appropriate chart areas.    ALL/Meds: Her medication and allergy histories were reviewed and are documented in their appropriate chart areas.    Social History   Substance Use Topics     Smoking status: Current Every Day Smoker     Packs/day: 0.50     Types: Cigarettes     Smokeless tobacco: Never Used      Comment: smoking  10 cig/day     Alcohol use Yes      Comment: Occas- quit with pregnacny        PE: /65 (BP Location: Right arm, Patient Position: Chair, Cuff Size: Adult Regular)  Pulse 71  Temp 97.9  F (36.6  C) (Tympanic)  Resp 16  Ht 5' 6.75\" (1.695 m)  Wt 129 lb 6.4 oz (58.7 kg)  BMI 20.42 kg/m2    General Appearance:  healthy, alert, active, no distress  HEENT: NCAT  Abdomen: Soft, nontender.  Normal bowel sounds.  No masses  Pelvic exam: normal vagina and vulva, normal cervix without lesions or tenderness.  IUD strings visible    A/P 28 year old  here for     ICD-10-CM    1. Dysfunctional uterine bleeding N93.8 HCG Qual, Urine - CSC and Range ([id number])   2. FIDELIA (generalized anxiety disorder) F41.1 escitalopram (LEXAPRO) 10 MG tablet        1. DUB: unclear why " this is occurring although could be a bad mirena device or poorly situated.  We discussed consideration for removal and replacement to see if symptoms improve.  Patient amenable and see note below.  If symptoms don't clear, may consider adding oral contraceptive pills to regulate menses and IUD for contraception.     2. FIDELIA: patient planning on weaning.  Would like next lower dose.    Tammy Beth M.D.      Jackie Quispe is a 28 year old  who presents today requesting replacement of an Mirena iud.    The patient meets and is agreeable to the following conditions:  She is not interested in conception in the near future. y  She currently is in a stable, monogamous relationship. n/a  There is no previous history of pelvic inflammatory disease. y  There is no previous history of ectopic pregnancy. y  She is willing to check monthly for the IUD string. y  She is at least 8 weeks post-partum. y  There is no history of unresolved abnormal uterine bleeding. current  There is no history of an unresolved abnormal PAP smear. y  She has no history of Speedy's disease or an allergy to copper (for Paraguard). y  She has no history of diabetes, AIDS, leukemia, IV drug use or chronic steroid use. y  She is willing to return annually for PAP smears. y  She has had a PAP smear within the past 6 months. y  She has had negative GC/Chlamydia testing within the past month. y  She denies the possibility of pregnancy. y  Pregnancy test today is negative. y    We discussed risks, benefits, and alternatives including but not limited to:   Possibility of pregnancy and ectopic pregnancy.y  Possibility of pelvic inflammatory disease, particularly with new partners.y  Risk of uterine perforation or IUD expulsion.y  Possibility of difficult removal.y  Spotting or heavy bleeding.y  Cramping, pain or infection during or after insertion.y    The patient was given patient information on the IUD and the patient education brochure from the  .  The patient has given consent to proceed with placement of the IUD.  She wishes to proceed.  All questions answered.    PROCEDURE:    Type of IUD: Mirena    She is placed in a dorsal lithotomy potion and a pelvic exam is performed to determine the position of the uterus.  The cervix is identified and cleaned with betadine.  The existing IUD strings were grasped and the IUD removed intact without complication.  The IUD did appear to be low in the lower uterine segment.  A single tooth tenaculum is applied to the anterior lip of the cervix for stabilization.  The uterus sounded to 8.0 cm. (Target sound depth is 6.5 cm to 8.5 cm.)  The IUD insertion tube is prepared to manufacturers recommendations and inserted into the uterus under sterile conditions in the usual fashion.  The IUD string is then cut to 2.0 cm.    The patient tolerated this procedure without immediate complication.  The patient is to return or call immediately for any unexplained fever, abdominal or pelvic pain, excessive bleeding, possibility of pregnancy, foul-smelling discharge, sense that the IUD has been expelled.  All questions were answered.    Tammy Beth M.D.

## 2018-04-08 ENCOUNTER — MYC MEDICAL ADVICE (OUTPATIENT)
Dept: FAMILY MEDICINE | Facility: CLINIC | Age: 28
End: 2018-04-08

## 2018-04-09 ENCOUNTER — OFFICE VISIT (OUTPATIENT)
Dept: FAMILY MEDICINE | Facility: CLINIC | Age: 28
End: 2018-04-09
Payer: COMMERCIAL

## 2018-04-09 VITALS
HEIGHT: 67 IN | TEMPERATURE: 98.5 F | WEIGHT: 129 LBS | SYSTOLIC BLOOD PRESSURE: 100 MMHG | BODY MASS INDEX: 20.25 KG/M2 | RESPIRATION RATE: 18 BRPM | DIASTOLIC BLOOD PRESSURE: 68 MMHG | HEART RATE: 110 BPM | OXYGEN SATURATION: 98 %

## 2018-04-09 DIAGNOSIS — J06.9 VIRAL URI: Primary | ICD-10-CM

## 2018-04-09 DIAGNOSIS — R07.0 THROAT PAIN: ICD-10-CM

## 2018-04-09 LAB
DEPRECATED S PYO AG THROAT QL EIA: NORMAL
SPECIMEN SOURCE: NORMAL

## 2018-04-09 PROCEDURE — 87880 STREP A ASSAY W/OPTIC: CPT | Performed by: FAMILY MEDICINE

## 2018-04-09 PROCEDURE — 87081 CULTURE SCREEN ONLY: CPT | Performed by: FAMILY MEDICINE

## 2018-04-09 PROCEDURE — 99213 OFFICE O/P EST LOW 20 MIN: CPT | Performed by: FAMILY MEDICINE

## 2018-04-09 NOTE — PROGRESS NOTES
SUBJECTIVE:   Jackie Quispe is a 28 year old female who presents to clinic today for the following health issues:      RESPIRATORY SYMPTOMS      Duration: FRIDAY     Description  nasal congestion, rhinorrhea, sore throat, facial pain/pressure, cough, wheezing, fever, ear pain both, fatigue/malaise and hoarse voice    Severity: moderate    Accompanying signs and symptoms: None    History (predisposing factors):  none    Precipitating or alleviating factors: None    Therapies tried and outcome:  rest and fluids, dayquil, nyquil, vicks       Problem list and histories reviewed & adjusted, as indicated.  Additional history: as documented    Patient Active Problem List   Diagnosis     Smoker     Acne     Dysfunctional uterine bleeding     FIDELIA (generalized anxiety disorder)     CARDIOVASCULAR SCREENING; LDL GOAL LESS THAN 160     Mirena IUD- remove by 3/2022     Past Surgical History:   Procedure Laterality Date     MOUTH SURGERY      wisdom teeth       Social History   Substance Use Topics     Smoking status: Current Every Day Smoker     Packs/day: 0.50     Types: Cigarettes     Smokeless tobacco: Never Used      Comment: smoking  10 cig/day     Alcohol use Yes      Comment: Occas- quit with pregnacny     Family History   Problem Relation Age of Onset     Alcohol/Drug Maternal Grandmother      alcohol     Hypertension Paternal Grandfather      CANCER Paternal Grandfather      HEART DISEASE Paternal Grandfather      CANCER Maternal Grandfather      lung     Alzheimer Disease Paternal Grandmother      Family History Negative Mother      Cardiovascular Father 54     MI     HEART DISEASE Father          Current Outpatient Prescriptions   Medication Sig Dispense Refill     escitalopram (LEXAPRO) 20 MG tablet Take 1 tablet (20 mg) by mouth daily 90 tablet 1     hydrOXYzine (ATARAX) 25 MG tablet Take 1-2 tablets (25-50 mg) by mouth every 6 hours as needed for anxiety 60 tablet 1     levonorgestrel (MIRENA) 20 MCG/24HR IUD  "1 each (20 mcg) by Intrauterine route once 7/8/2016 1 each 0     escitalopram (LEXAPRO) 10 MG tablet Take 1 tablet (10 mg) by mouth daily (Patient not taking: Reported on 4/9/2018) 90 tablet 0     No Known Allergies  Recent Labs   Lab Test  09/13/16   1750   ALT  17   CR  0.61   GFRESTIMATED  >90  Non  GFR Calc     GFRESTBLACK  >90   GFR Calc     POTASSIUM  3.9   TSH  1.22      BP Readings from Last 3 Encounters:   04/09/18 100/68   03/02/18 109/65   02/16/18 110/71    Wt Readings from Last 3 Encounters:   04/09/18 129 lb (58.5 kg)   03/02/18 129 lb 6.4 oz (58.7 kg)   02/16/18 128 lb 9.6 oz (58.3 kg)                  Labs reviewed in EPIC    Reviewed and updated as needed this visit by clinical staff       Reviewed and updated as needed this visit by Provider         ROS:  Constitutional, HEENT, cardiovascular, pulmonary, gi and gu systems are negative, except as otherwise noted.    OBJECTIVE:     /68 (Cuff Size: Adult Regular)  Pulse 110  Temp 98.5  F (36.9  C) (Tympanic)  Resp 18  Ht 5' 6.75\" (1.695 m)  Wt 129 lb (58.5 kg)  SpO2 98%  Breastfeeding? No  BMI 20.36 kg/m2  Body mass index is 20.36 kg/(m^2).  GENERAL: healthy, alert and no distress  EYES: Eyes grossly normal to inspection, PERRL and conjunctivae and sclerae normal  HENT: normal cephalic/atraumatic, ear canals and TM's normal, oral mucous membranes moist, oropharxnx crowded and sinuses: not tender  NECK: no adenopathy, no asymmetry, masses, or scars and thyroid normal to palpation  RESP: lungs clear to auscultation - no rales, rhonchi or wheezes  CV: tachycardia, normal S1 S2, no S3 or S4, no murmur, click or rub and peripheral pulses strong  ABDOMEN: soft, nontender, no hepatosplenomegaly, no masses and bowel sounds normal  MS: no gross musculoskeletal defects noted, no edema    Diagnostic Test Results:  Results for orders placed or performed in visit on 04/09/18 (from the past 24 hour(s))   Strep, Rapid " Screen   Result Value Ref Range    Specimen Description Throat     Rapid Strep A Screen       NEGATIVE: No Group A streptococcal antigen detected by immunoassay, await culture report.       ASSESSMENT/PLAN:         ICD-10-CM    1. Viral URI J06.9     B97.89    2. Throat pain R07.0 Strep, Rapid Screen     Beta strep group A culture       Discussed in detail differentials and further management. Symptoms are likely secondary to viral infection. Recommended well hydration, over-the-counter analgesia, warm fluids and saline gargles. Written instructions/information provided. Patient understood and in agreement with the above plan. All questions are answered. Follow-up if symptoms persist or worsen.        Patient Instructions     Viral Upper Respiratory Illness (Adult)  You have a viral upper respiratory illness (URI), which is another term for the common cold. This illness is contagious during the first few days. It is spread through the air by coughing and sneezing. It may also be spread by direct contact (touching the sick person and then touching your own eyes, nose, or mouth). Frequent handwashing will decrease risk of spread. Most viral illnesses go away within 7 to 10 days with rest and simple home remedies. Sometimes the illness may last for several weeks. Antibiotics will not kill a virus, and they are generally not prescribed for this condition.    Home care    If symptoms are severe, rest at home for the first 2 to 3 days. When you resume activity, don't let yourself get too tired.    Avoid being exposed to cigarette smoke (yours or others ).    You may use acetaminophen or ibuprofen to control pain and fever, unless another medicine was prescribed. (Note: If you have chronic liver or kidney disease, have ever had a stomach ulcer or gastrointestinal bleeding, or are taking blood-thinning medicines, talk with your healthcare provider before using these medicines.) Aspirin should never be given to anyone under  18 years of age who is ill with a viral infection or fever. It may cause severe liver or brain damage.    Your appetite may be poor, so a light diet is fine. Avoid dehydration by drinking 6 to 8 glasses of fluids per day (water, soft drinks, juices, tea, or soup). Extra fluids will help loosen secretions in the nose and lungs.    Over-the-counter cold medicines will not shorten the length of time you re sick, but they may be helpful for the following symptoms: cough, sore throat, and nasal and sinus congestion. (Note: Do not use decongestants if you have high blood pressure.)  Follow-up care  Follow up with your healthcare provider, or as advised.  When to seek medical advice  Call your healthcare provider right away if any of these occur:    Cough with lots of colored sputum (mucus)    Severe headache; face, neck, or ear pain    Difficulty swallowing due to throat pain    Fever of 100.4 F (38 C)  Call 911, or get immediate medical care  Call emergency services right away if any of these occur:    Chest pain, shortness of breath, wheezing, or difficulty breathing    Coughing up blood    Inability to swallow due to throat pain  Date Last Reviewed: 9/13/2015 2000-2017 [employer]. [address] [place] [address], [place] [address]. All rights reserved. This information is not intended as a substitute for professional medical care. Always follow your healthcare professional's instructions.            Tru Savage MD  [facility]

## 2018-04-09 NOTE — NURSING NOTE
"Chief Complaint   Patient presents with     URI       Initial /68 (Cuff Size: Adult Regular)  Pulse 110  Temp 98.5  F (36.9  C) (Tympanic)  Resp 18  Ht 5' 6.75\" (1.695 m)  Wt 129 lb (58.5 kg)  SpO2 98%  Breastfeeding? No  BMI 20.36 kg/m2 Estimated body mass index is 20.36 kg/(m^2) as calculated from the following:    Height as of this encounter: 5' 6.75\" (1.695 m).    Weight as of this encounter: 129 lb (58.5 kg).      Health Maintenance that is potentially due pending provider review:  NONE    n/a    Is there anyone who you would like to be able to receive your results? Not Applicable  If yes have patient fill out JESSICA    "

## 2018-04-09 NOTE — PATIENT INSTRUCTIONS

## 2018-04-09 NOTE — MR AVS SNAPSHOT
After Visit Summary   4/9/2018    Jackie Quispe    MRN: [id number]           Patient Information     Date Of Birth          1990        Visit Information        Provider Department      4/9/2018 3:20 PM Tru Savage MD [facility]        Today's Diagnoses     Viral URI    -  1    Throat pain          Care Instructions      Viral Upper Respiratory Illness (Adult)  You have a viral upper respiratory illness (URI), which is another term for the common cold. This illness is contagious during the first few days. It is spread through the air by coughing and sneezing. It may also be spread by direct contact (touching the sick person and then touching your own eyes, nose, or mouth). Frequent handwashing will decrease risk of spread. Most viral illnesses go away within 7 to 10 days with rest and simple home remedies. Sometimes the illness may last for several weeks. Antibiotics will not kill a virus, and they are generally not prescribed for this condition.    Home care    If symptoms are severe, rest at home for the first 2 to 3 days. When you resume activity, don't let yourself get too tired.    Avoid being exposed to cigarette smoke (yours or others ).    You may use acetaminophen or ibuprofen to control pain and fever, unless another medicine was prescribed. (Note: If you have chronic liver or kidney disease, have ever had a stomach ulcer or gastrointestinal bleeding, or are taking blood-thinning medicines, talk with your healthcare provider before using these medicines.) Aspirin should never be given to anyone under 18 years of age who is ill with a viral infection or fever. It may cause severe liver or brain damage.    Your appetite may be poor, so a light diet is fine. Avoid dehydration by drinking 6 to 8 glasses of fluids per day (water, soft drinks, juices, tea, or soup). Extra fluids will help loosen secretions in the nose and lungs.    Over-the-counter cold medicines will  not shorten the length of time you re sick, but they may be helpful for the following symptoms: cough, sore throat, and nasal and sinus congestion. (Note: Do not use decongestants if you have high blood pressure.)  Follow-up care  Follow up with your healthcare provider, or as advised.  When to seek medical advice  Call your healthcare provider right away if any of these occur:    Cough with lots of colored sputum (mucus)    Severe headache; face, neck, or ear pain    Difficulty swallowing due to throat pain    Fever of 100.4 F (38 C)  Call 911, or get immediate medical care  Call emergency services right away if any of these occur:    Chest pain, shortness of breath, wheezing, or difficulty breathing    Coughing up blood    Inability to swallow due to throat pain  Date Last Reviewed: 9/13/2015 2000-2017 [employer]. [address] [place] [address]. All rights reserved. This information is not intended as a substitute for professional medical care. Always follow your healthcare professional's instructions.                Follow-ups after your visit        Who to contact     If you have questions or need follow up information about today's clinic visit or your schedule please contact [facility] directly at [phone].  Normal or non-critical lab and imaging results will be communicated to you by MyChart, letter or phone within 4 business days after the clinic has received the results. If you do not hear from us within 7 days, please contact the clinic through [employer] or phone. If you have a critical or abnormal lab result, we will notify you by phone as soon as possible.  Submit refill requests through [employer] or call your pharmacy and they will forward the refill request to us. Please allow 3 business days for your refill to be completed.          Additional Information About Your Visit        [employer] Information     [employer] gives you secure access to your electronic  "health record. If you see a primary care provider, you can also send messages to your care team and make appointments. If you have questions, please call your primary care clinic.  If you do not have a primary care provider, please call [phone] and they will assist you.        Care EveryWhere ID     This is your Care EveryWhere ID. This could be used by other organizations to access your [place] medical records  [id number]        Your Vitals Were     Pulse Temperature Respirations Height Pulse Oximetry Breastfeeding?    110 98.5  F (36.9  C) (Tympanic) 18 5' 6.75\" (1.695 m) 98% No    BMI (Body Mass Index)                   20.36 kg/m2            Blood Pressure from Last 3 Encounters:   04/09/18 100/68   03/02/18 109/65   02/16/18 110/71    Weight from Last 3 Encounters:   04/09/18 129 lb (58.5 kg)   03/02/18 129 lb 6.4 oz (58.7 kg)   02/16/18 128 lb 9.6 oz (58.3 kg)              We Performed the Following     Strep, Rapid Screen          Today's Medication Changes          These changes are accurate as of 4/9/18  3:47 PM.  If you have any questions, ask your nurse or doctor.               Stop taking these medicines if you haven't already. Please contact your care team if you have questions.     estradiol 1 MG tablet   Commonly known as:  ESTRACE   Stopped by:  Tru Savage MD                    Primary Care Provider Office Phone # Fax #    Tammy Teagan Beth [phone]       [facility] [address]        Equal Access to Services     PHIL QUINONES : Hadii daniel shabazz Somontrell, waaxda luqadaha, qaybta kaalmasunni pappas. So [facility] [phone].    ATENCIÓN: Si habla español, tiene a dang disposición servicios gratuitos de asistencia lingüística. Llame al [phone].    We comply with applicable federal civil rights laws and [place] laws. We do not discriminate on the basis of race, color, national origin, age, disability, sex, " sexual orientation, or gender identity.            Thank you!     Thank you for choosing [facility]  for your care. Our goal is always to provide you with excellent care. Hearing back from our patients is one way we can continue to improve our services. Please take a few minutes to complete the written survey that you may receive in the mail after your visit with us. Thank you!             Your Updated Medication List - Protect others around you: Learn how to safely use, store and throw away your medicines at www.disposemymeds.org.          This list is accurate as of 4/9/18  3:47 PM.  Always use your most recent med list.                   Brand Name Dispense Instructions for use Diagnosis    * escitalopram 20 MG tablet    LEXAPRO    90 tablet    Take 1 tablet (20 mg) by mouth daily    FIDELIA (generalized anxiety disorder)       * escitalopram 10 MG tablet    LEXAPRO    90 tablet    Take 1 tablet (10 mg) by mouth daily    FIDELIA (generalized anxiety disorder)       hydrOXYzine 25 MG tablet    ATARAX    60 tablet    Take 1-2 tablets (25-50 mg) by mouth every 6 hours as needed for anxiety    FIDELIA (generalized anxiety disorder)       levonorgestrel 20 MCG/24HR IUD    MIRENA    1 each    1 each (20 mcg) by Intrauterine route once 7/8/2016        * Notice:  This list has 2 medication(s) that are the same as other medications prescribed for you. Read the directions carefully, and ask your doctor or other care provider to review them with you.

## 2018-04-10 LAB
BACTERIA SPEC CULT: NORMAL
SPECIMEN SOURCE: NORMAL

## 2018-04-11 ENCOUNTER — MYC MEDICAL ADVICE (OUTPATIENT)
Dept: FAMILY MEDICINE | Facility: CLINIC | Age: 28
End: 2018-04-11

## 2018-04-11 NOTE — TELEPHONE ENCOUNTER
Last OV 4-9-18 with Dr Savage, pt is not improving.  Should she come back, DX URI.  Shyanne Otoole RN

## 2018-05-30 ENCOUNTER — OFFICE VISIT (OUTPATIENT)
Dept: FAMILY MEDICINE | Facility: CLINIC | Age: 28
End: 2018-05-30
Payer: COMMERCIAL

## 2018-05-30 ENCOUNTER — RADIANT APPOINTMENT (OUTPATIENT)
Dept: GENERAL RADIOLOGY | Facility: CLINIC | Age: 28
End: 2018-05-30
Attending: NURSE PRACTITIONER
Payer: COMMERCIAL

## 2018-05-30 VITALS
BODY MASS INDEX: 18.68 KG/M2 | RESPIRATION RATE: 18 BRPM | OXYGEN SATURATION: 100 % | DIASTOLIC BLOOD PRESSURE: 62 MMHG | WEIGHT: 119 LBS | HEIGHT: 67 IN | HEART RATE: 88 BPM | SYSTOLIC BLOOD PRESSURE: 116 MMHG | TEMPERATURE: 98 F

## 2018-05-30 DIAGNOSIS — S23.41XA SPRAIN OF COSTAL CARTILAGE, INITIAL ENCOUNTER: ICD-10-CM

## 2018-05-30 DIAGNOSIS — M94.0 COSTOCHONDRITIS: ICD-10-CM

## 2018-05-30 DIAGNOSIS — J20.9 ACUTE BRONCHITIS WITH SYMPTOMS > 10 DAYS: Primary | ICD-10-CM

## 2018-05-30 DIAGNOSIS — R07.81 RIB PAIN ON LEFT SIDE: ICD-10-CM

## 2018-05-30 PROCEDURE — 71101 X-RAY EXAM UNILAT RIBS/CHEST: CPT | Mod: LT

## 2018-05-30 PROCEDURE — 99214 OFFICE O/P EST MOD 30 MIN: CPT | Performed by: NURSE PRACTITIONER

## 2018-05-30 RX ORDER — BENZONATATE 100 MG/1
100 CAPSULE ORAL 3 TIMES DAILY PRN
Qty: 42 CAPSULE | Refills: 0 | Status: SHIPPED | OUTPATIENT
Start: 2018-05-30 | End: 2018-06-20

## 2018-05-30 RX ORDER — PREDNISONE 20 MG/1
TABLET ORAL
Qty: 10 TABLET | Refills: 0 | Status: SHIPPED | OUTPATIENT
Start: 2018-05-30 | End: 2018-06-07

## 2018-05-30 NOTE — NURSING NOTE
"Chief Complaint   Patient presents with     Cough     Rib Pain       Initial /62 (BP Location: Right arm, Cuff Size: Adult Regular)  Pulse 88  Temp 98  F (36.7  C) (Tympanic)  Resp 18  Ht 5' 6.75\" (1.695 m)  Wt 119 lb (54 kg)  SpO2 100%  BMI 18.78 kg/m2 Estimated body mass index is 18.78 kg/(m^2) as calculated from the following:    Height as of this encounter: 5' 6.75\" (1.695 m).    Weight as of this encounter: 119 lb (54 kg).      Health Maintenance that is potentially due pending provider review:  NONE    Is there anyone who you would like to be able to receive your results? No  If yes have patient fill out JESSICA      "

## 2018-05-30 NOTE — PATIENT INSTRUCTIONS
Use Medication as directed    Hydrate with fluids, rest, cool humidifier.  May use acetaminophen, ibuprofen prn.    For your Cough   The Buckwheat Honey Dose: Given   hour Prior to Bedtime  For children age under 1 year -Do not use due to botulism risk     2-5 years -  tsp (2.5 mL)    6-11 years -1 tsp (5 mL)    12-18 years -2 tsp (10 mL)     Guaifenesin     Adult dose -Not to exceed 2.4 g (2400mg) per day    Child age 6-12 years -100 mg every 4 hr, not to exceed 1.2 g (1200mg) per day     Pediatric, 2-6 years -50 mg every 4 hr as needed, not to exceed 600 mg per day    Cough medications is not recommended in children under 2 years.  With use of cough medications have combination medications be aware of products in the cough medication you are using to avoid overdose    Follow up with PCP in 1 weeks.    Go to Emergency Room if sx worsen or change, Shortness of breath, chest pain, persistent fevers, or painful breathing occur.     Patient voiced understanding of instructions given.          Viral Bronchitis (Adult)    You have a viral bronchitis. Bronchitis is inflammation and swelling of the lining of the lungs. This is often caused by an infection. Symptoms include a dry, hacking cough that is worse at night. The cough may bring up yellow-green mucus. You may also feel short of breath or wheeze. Other symptoms may include tiredness, chest discomfort, and chills.  Bronchitis that is caused by a virus is not treated with antibiotics. Instead, medicines may be given to help relieve symptoms. Symptoms can last up to 2 weeks, although the cough may last much longer.  This illness is contagious during the first few days and is spread through the air by coughing and sneezing, or by direct contact (touching the sick person and then touching your own eyes, nose, or mouth).  Most viral illnesses resolve within 10 to 14 days with rest and simple home remedies, although they may sometimes last for several weeks.  Home  care    If symptoms are severe, rest at home for the first 2 to 3 days. When you go back to your usual activities, don't let yourself get too tired.    Do not smoke. Also avoid being exposed to secondhand smoke.    You may use over-the-counter medicine to control fever or pain, unless another pain medicine was prescribed. If you have chronic liver or kidney disease or have ever had a stomach ulcer or gastrointestinal bleeding, talk with your healthcare provider before using these medicines. Also talk to your provider if you are taking medicine to prevent blood clots. Aspirin should never be given to anyone younger than 18 years of age who is ill with a viral infection or fever. It may cause severe liver or brain damage.    Your appetite may be poor, so a light diet is fine. Avoid dehydration by drinking 6 to 8 glasses of fluids per day (such as water, soft drinks, sports drinks, juices, tea, or soup). Extra fluids will help loosen secretions in the nose and lungs.    Over-the-counter cough, cold, and sore-throat medicines will not shorten the length of the illness, but they may help to reduce symptoms. Don't use decongestants if you have high blood pressure.  Follow-up care  Follow up with your healthcare provider, or as advised. If you had an X-ray or ECG (electrocardiogram), a specialist will review it. You will be notified of any new findings that may affect your care.  If you are age 65 or older, or if you have a chronic lung disease or condition that affects your immune system, or you smoke, ask your healthcare provider about getting a pneumococcal vaccine and a yearly flu shot (influenza vaccine).  When to seek medical advice  Call your healthcare provider right away if any of these occur:    Fever of 100.4 F (38 C) or higher, or as directed by your healthcare provider    Coughing up increased amounts of colored sputum    Weakness, drowsiness, headache, facial pain, ear pain, or a stiff neck  Call 911  Call  911 if any of these occur:    Coughing up blood    Worsening weakness, drowsiness, headache, or stiff neck    Trouble breathing, wheezing, or pain with breathing  Date Last Reviewed: 9/13/2015 2000-2017 [employer]. [address] [place] [address], [place] [address]. All rights reserved. This information is not intended as a substitute for professional medical care. Always follow your healthcare professional's instructions.        Costochondritis    Costochondritis is inflammation of a rib or the cartilage that connects a rib to your breastbone (sternum). It causes tenderness, and sometimes chest pain may be sharp or aching, or it may feel like pressure. Pain may get worse with deep breathing, movement, or exercise. In some cases, the pain is mistaken for a heart attack. Despite this, the condition is not serious. Read on to learn more about the condition and how it can be treated.  What causes costochondritis?  The cause of costochondritis is not completely clear, but it may happen after a chest injury, chest infection, or coughing episode. Some physical activities can sometimes lead to costochondritis. Large-breasted women may be more likely to have the condition. Often, the reason for the inflammation is unknown.  Diagnosing costochondritis  There is no test for costochondritis. The condition is diagnosed by the symptoms you have. Your healthcare provider will perform a physical exam. He or she will ask you about your symptoms and examine your chest for tenderness. In some cases, tests are done to rule out more serious problems. These tests may include imaging tests such as chest X-ray, CT scan, or an ECG.  Treating costochondritis  If an underlying cause is found, treatment for that will likely relieve the problem. Costochondritis often goes away on its own. The course of the condition varies from person to person. It usually lasts from weeks to months. In some cases, mild symptoms continue for months to  years. To ease symptoms:    Take medicine as directed. These relieve pain and swelling. Ibuprofen or other NSAIDs are often recommended. In some cases, you may be given prescription medicine, such as muscle relaxants.    Avoid activities that put stress on the chest or spine.    Apply a heating pad (set to warm, not too high, heat) to the breastbone several times a day.    Perform stretching exercises as directed.  Call the healthcare provider right away if you have any of the following:    Pain that is not relieved by medicine    Shortness of breath    Lightheadedness, dizziness, or fainting    Feeling of irregular heartbeat or fast pulse  Anyone with chest pain should see a healthcare provider, especially those who are older and may be at risk for heart disease.   Date Last Reviewed: 10/1/2016    7612-7632 [employer]. [address] [place] [address], [place] [id number]. All rights reserved. This information is not intended as a substitute for professional medical care. Always follow your healthcare professional's instructions.        Rib Contusion     A rib contusion is a bruise to one or more rib bones. It may cause pain, tenderness, swelling and a purplish discoloration. There may be a sharp pain while breathing.  You will be assessed for other injuries. You will likely be given pain medicine. Rib contusions heal on their own, without further treatment. However, pain may take weeks to months to go away.   Note that a small crack (fracture) in the rib may cause the same symptoms as a rib contusion. The small crack may not be seen on a chest X-ray. However, the conditions are managed in the same way.  Home care    Rest. Avoid heavy lifting, strenuous exertion, or any activity that causes pain.    Ice the area to reduce pain and swelling. Put ice cubes in a plastic bag or use a cold pack. (Wrap the cold source in a thin towel. Do not place it directly on your skin.) Ice the injured area for 20 minutes every 1  to 2 hours the first day. Continue with ice packs 3 to 4 times a day for the next 2 days, then as needed for the relief of pain and swelling.    Take any prescribed pain medicine as directed by your healthcare provider. If none was prescribed, take acetaminophen, ibuprofen, or naproxen to control pain.    If you have a significant injury, you may be given a device called an incentive spirometer to keep your lungs healthy. Use as directed.  Follow-up care  Follow up with your healthcare provider during the next week or as directed.  When to seek medical advice  Call your healthcare provider for any of the following:    Shortness of breath or trouble breathing    Increasing chest pain with breathing    Coughing    Dizziness, weakness, or fainting    New or worsening pain    Fever of 100.4 F (38 C) or higher, or as directed by your healthcare provider  Date Last Reviewed: 2/1/2017 2000-2017 [employer]. [address] [place] [address], [place] [address]. All rights reserved. This information is not intended as a substitute for professional medical care. Always follow your healthcare professional's instructions.

## 2018-05-30 NOTE — PROGRESS NOTES
SUBJECTIVE:   Jackie Quispe is a 28 year old female who presents to clinic today for the following health issues:    ENT Symptoms             Symptoms: cc Present Absent Comment   Fever/Chills   x    Fatigue   x    Muscle Aches  x     Eye Irritation   x    Sneezing  x     Nasal Dean/Drg   x    Sinus Pressure/Pain   x    Loss of smell   x    Dental pain   x    Sore Throat   x    Swollen Glands   x    Ear Pain/Fullness   x    Cough  x     Wheeze  x     Chest Pain  x     Shortness of breath  x     Rash   x    Other  x  Left sided rib pain     Symptom duration:  two months   Symptom severity:  moderate   Treatments tried:  seeing the chiropractor for rib pain, ice, ibuprofen   Contacts:  none         Problem list and histories reviewed & adjusted, as indicated.  Additional history: as documented    Patient Active Problem List   Diagnosis     Smoker     Acne     Dysfunctional uterine bleeding     FIDELIA (generalized anxiety disorder)     CARDIOVASCULAR SCREENING; LDL GOAL LESS THAN 160     Mirena IUD- remove by 3/2022     Rib pain     Past Surgical History:   Procedure Laterality Date     MOUTH SURGERY      wisdom teeth       Social History   Substance Use Topics     Smoking status: Current Every Day Smoker     Packs/day: 0.50     Types: Cigarettes     Smokeless tobacco: Never Used      Comment: smoking  10 cig/day     Alcohol use Yes      Comment: Occas- quit with pregnbenji     Family History   Problem Relation Age of Onset     Alcohol/Drug Maternal Grandmother      alcohol     Hypertension Paternal Grandfather      CANCER Paternal Grandfather      HEART DISEASE Paternal Grandfather      CANCER Maternal Grandfather      lung     Alzheimer Disease Paternal Grandmother      Family History Negative Mother      Cardiovascular Father 54     MI     HEART DISEASE Father          Current Outpatient Prescriptions   Medication Sig Dispense Refill     benzonatate (TESSALON PERLES) 100 MG capsule Take 1 capsule (100 mg) by mouth  "3 times daily as needed 42 capsule 0     levonorgestrel (MIRENA) 20 MCG/24HR IUD 1 each (20 mcg) by Intrauterine route once 7/8/2016 1 each 0     predniSONE (DELTASONE) 20 MG tablet Take one tablet twice a day for 5 days. 10 tablet 0     No Known Allergies    Reviewed and updated as needed this visit by clinical staff       Reviewed and updated as needed this visit by Provider         ROS:  Constitutional, HEENT, cardiovascular, pulmonary, gi and gu systems are negative, except as otherwise noted.    OBJECTIVE:     /62 (BP Location: Right arm, Cuff Size: Adult Regular)  Pulse 88  Temp 98  F (36.7  C) (Tympanic)  Resp 18  Ht 5' 6.75\" (1.695 m)  Wt 119 lb (54 kg)  SpO2 100%  BMI 18.78 kg/m2  Body mass index is 18.78 kg/(m^2).  GENERAL: healthy, alert and no distress  EYES: Eyes grossly normal to inspection, PERRL and conjunctivae and sclerae normal  HENT: ear canals and TM's normal, nose and mouth without ulcers or lesions  NECK: no adenopathy, no asymmetry, masses, or scars and thyroid normal to palpation  RESP: lungs clear to auscultation - no rales, rhonchi or wheezes  CV: regular rate and rhythm, normal S1 S2, no S3 or S4, no murmur, click or rub, no peripheral edema and peripheral pulses strong  CV:  Tenderness to palpation to the 10-12 th rib at the left midclavicular line  MS: no gross musculoskeletal defects noted, no edema  SKIN: no suspicious lesions or rashes  NEURO: Normal strength and tone, mentation intact and speech normal  PSYCH: mentation appears normal, affect normal/bright    XR RIBS & CHEST LT 3VW 5/30/2018 10:25 AM     COMPARISON: None.     HISTORY: Left-sided rib pain.         IMPRESSION: Cardiac silhouette and pulmonary vasculature are within  normal limits. No focal airspace disease, pleural effusion or  pneumothorax. No rib fractures are seen.       ASSESSMENT/PLAN:       ICD-10-CM    1. Acute bronchitis with symptoms > 10 days J20.9 benzonatate (TESSALON PERLES) 100 MG capsule "   2. Sprain of costal cartilage, initial encounter S23.41XA predniSONE (DELTASONE) 20 MG tablet     PHYSICAL THERAPY REFERRAL   3. Costochondritis M94.0    4. Rib pain on left side R07.81 XR Ribs & Chest Left G/E 3 Views       Patient Instructions     Use Medication as directed    Hydrate with fluids, rest, cool humidifier.  May use acetaminophen, ibuprofen prn.    For your Cough   The Buckwheat Honey Dose: Given   hour Prior to Bedtime  For children age under 1 year -Do not use due to botulism risk     2-5 years -  tsp (2.5 mL)    6-11 years -1 tsp (5 mL)    12-18 years -2 tsp (10 mL)     Guaifenesin     Adult dose -Not to exceed 2.4 g (2400mg) per day    Child age 6-12 years -100 mg every 4 hr, not to exceed 1.2 g (1200mg) per day     Pediatric, 2-6 years -50 mg every 4 hr as needed, not to exceed 600 mg per day    Cough medications is not recommended in children under 2 years.  With use of cough medications have combination medications be aware of products in the cough medication you are using to avoid overdose    Follow up with PCP in 1 weeks.    Go to Emergency Room if sx worsen or change, Shortness of breath, chest pain, persistent fevers, or painful breathing occur.     Patient voiced understanding of instructions given.          Viral Bronchitis (Adult)    You have a viral bronchitis. Bronchitis is inflammation and swelling of the lining of the lungs. This is often caused by an infection. Symptoms include a dry, hacking cough that is worse at night. The cough may bring up yellow-green mucus. You may also feel short of breath or wheeze. Other symptoms may include tiredness, chest discomfort, and chills.  Bronchitis that is caused by a virus is not treated with antibiotics. Instead, medicines may be given to help relieve symptoms. Symptoms can last up to 2 weeks, although the cough may last much longer.  This illness is contagious during the first few days and is spread through the air by coughing and  sneezing, or by direct contact (touching the sick person and then touching your own eyes, nose, or mouth).  Most viral illnesses resolve within 10 to 14 days with rest and simple home remedies, although they may sometimes last for several weeks.  Home care    If symptoms are severe, rest at home for the first 2 to 3 days. When you go back to your usual activities, don't let yourself get too tired.    Do not smoke. Also avoid being exposed to secondhand smoke.    You may use over-the-counter medicine to control fever or pain, unless another pain medicine was prescribed. If you have chronic liver or kidney disease or have ever had a stomach ulcer or gastrointestinal bleeding, talk with your healthcare provider before using these medicines. Also talk to your provider if you are taking medicine to prevent blood clots. Aspirin should never be given to anyone younger than 18 years of age who is ill with a viral infection or fever. It may cause severe liver or brain damage.    Your appetite may be poor, so a light diet is fine. Avoid dehydration by drinking 6 to 8 glasses of fluids per day (such as water, soft drinks, sports drinks, juices, tea, or soup). Extra fluids will help loosen secretions in the nose and lungs.    Over-the-counter cough, cold, and sore-throat medicines will not shorten the length of the illness, but they may help to reduce symptoms. Don't use decongestants if you have high blood pressure.  Follow-up care  Follow up with your healthcare provider, or as advised. If you had an X-ray or ECG (electrocardiogram), a specialist will review it. You will be notified of any new findings that may affect your care.  If you are age 65 or older, or if you have a chronic lung disease or condition that affects your immune system, or you smoke, ask your healthcare provider about getting a pneumococcal vaccine and a yearly flu shot (influenza vaccine).  When to seek medical advice  Call your healthcare provider right  away if any of these occur:    Fever of 100.4 F (38 C) or higher, or as directed by your healthcare provider    Coughing up increased amounts of colored sputum    Weakness, drowsiness, headache, facial pain, ear pain, or a stiff neck  Call 911  Call 911 if any of these occur:    Coughing up blood    Worsening weakness, drowsiness, headache, or stiff neck    Trouble breathing, wheezing, or pain with breathing  Date Last Reviewed: 9/13/2015 2000-2017 [employer]. [address] [place] [id number]. All rights reserved. This information is not intended as a substitute for professional medical care. Always follow your healthcare professional's instructions.        Costochondritis    Costochondritis is inflammation of a rib or the cartilage that connects a rib to your breastbone (sternum). It causes tenderness, and sometimes chest pain may be sharp or aching, or it may feel like pressure. Pain may get worse with deep breathing, movement, or exercise. In some cases, the pain is mistaken for a heart attack. Despite this, the condition is not serious. Read on to learn more about the condition and how it can be treated.  What causes costochondritis?  The cause of costochondritis is not completely clear, but it may happen after a chest injury, chest infection, or coughing episode. Some physical activities can sometimes lead to costochondritis. Large-breasted women may be more likely to have the condition. Often, the reason for the inflammation is unknown.  Diagnosing costochondritis  There is no test for costochondritis. The condition is diagnosed by the symptoms you have. Your healthcare provider will perform a physical exam. He or she will ask you about your symptoms and examine your chest for tenderness. In some cases, tests are done to rule out more serious problems. These tests may include imaging tests such as chest X-ray, CT scan, or an ECG.  Treating costochondritis  If an underlying cause is  found, treatment for that will likely relieve the problem. Costochondritis often goes away on its own. The course of the condition varies from person to person. It usually lasts from weeks to months. In some cases, mild symptoms continue for months to years. To ease symptoms:    Take medicine as directed. These relieve pain and swelling. Ibuprofen or other NSAIDs are often recommended. In some cases, you may be given prescription medicine, such as muscle relaxants.    Avoid activities that put stress on the chest or spine.    Apply a heating pad (set to warm, not too high, heat) to the breastbone several times a day.    Perform stretching exercises as directed.  Call the healthcare provider right away if you have any of the following:    Pain that is not relieved by medicine    Shortness of breath    Lightheadedness, dizziness, or fainting    Feeling of irregular heartbeat or fast pulse  Anyone with chest pain should see a healthcare provider, especially those who are older and may be at risk for heart disease.   Date Last Reviewed: 10/1/2016    1700-9746 [employer]. [address] [place] [address]. All rights reserved. This information is not intended as a substitute for professional medical care. Always follow your healthcare professional's instructions.        Rib Contusion     A rib contusion is a bruise to one or more rib bones. It may cause pain, tenderness, swelling and a purplish discoloration. There may be a sharp pain while breathing.  You will be assessed for other injuries. You will likely be given pain medicine. Rib contusions heal on their own, without further treatment. However, pain may take weeks to months to go away.   Note that a small crack (fracture) in the rib may cause the same symptoms as a rib contusion. The small crack may not be seen on a chest X-ray. However, the conditions are managed in the same way.  Home care    Rest. Avoid heavy lifting, strenuous exertion,  or any activity that causes pain.    Ice the area to reduce pain and swelling. Put ice cubes in a plastic bag or use a cold pack. (Wrap the cold source in a thin towel. Do not place it directly on your skin.) Ice the injured area for 20 minutes every 1 to 2 hours the first day. Continue with ice packs 3 to 4 times a day for the next 2 days, then as needed for the relief of pain and swelling.    Take any prescribed pain medicine as directed by your healthcare provider. If none was prescribed, take acetaminophen, ibuprofen, or naproxen to control pain.    If you have a significant injury, you may be given a device called an incentive spirometer to keep your lungs healthy. Use as directed.  Follow-up care  Follow up with your healthcare provider during the next week or as directed.  When to seek medical advice  Call your healthcare provider for any of the following:    Shortness of breath or trouble breathing    Increasing chest pain with breathing    Coughing    Dizziness, weakness, or fainting    New or worsening pain    Fever of 100.4 F (38 C) or higher, or as directed by your healthcare provider  Date Last Reviewed: 2/1/2017 2000-2017 [employer]. [address] [place] [address], [place] [address]. All rights reserved. This information is not intended as a substitute for professional medical care. Always follow your healthcare professional's instructions.            CADY Iniguez [facility]

## 2018-06-05 ENCOUNTER — MYC MEDICAL ADVICE (OUTPATIENT)
Dept: FAMILY MEDICINE | Facility: CLINIC | Age: 28
End: 2018-06-05

## 2018-06-07 ENCOUNTER — OFFICE VISIT (OUTPATIENT)
Dept: FAMILY MEDICINE | Facility: CLINIC | Age: 28
End: 2018-06-07
Payer: COMMERCIAL

## 2018-06-07 DIAGNOSIS — J30.2 ACUTE SEASONAL ALLERGIC RHINITIS, UNSPECIFIED TRIGGER: ICD-10-CM

## 2018-06-07 DIAGNOSIS — H10.33 ACUTE CONJUNCTIVITIS OF BOTH EYES, UNSPECIFIED ACUTE CONJUNCTIVITIS TYPE: ICD-10-CM

## 2018-06-07 DIAGNOSIS — J20.9 ACUTE BRONCHITIS WITH SYMPTOMS > 10 DAYS: Primary | ICD-10-CM

## 2018-06-07 PROCEDURE — 99214 OFFICE O/P EST MOD 30 MIN: CPT | Performed by: PHYSICIAN ASSISTANT

## 2018-06-07 RX ORDER — DOXYCYCLINE 100 MG/1
100 CAPSULE ORAL 2 TIMES DAILY
Qty: 28 CAPSULE | Refills: 0 | Status: SHIPPED | OUTPATIENT
Start: 2018-06-07 | End: 2018-06-21

## 2018-06-07 RX ORDER — NEOMYCIN SULFATE, POLYMYXIN B SULFATE, HYDROCORTISONE 3.5; 10000; 1 MG/ML; [USP'U]/ML; MG/ML
3 SOLUTION/ DROPS AURICULAR (OTIC) 4 TIMES DAILY
COMMUNITY
End: 2018-06-20

## 2018-06-07 RX ORDER — OLOPATADINE HYDROCHLORIDE 2 MG/ML
1 SOLUTION/ DROPS OPHTHALMIC DAILY
Qty: 2.5 ML | Refills: 3 | Status: SHIPPED | OUTPATIENT
Start: 2018-06-07 | End: 2018-09-24

## 2018-06-07 RX ORDER — FLUTICASONE PROPIONATE 50 MCG
1-2 SPRAY, SUSPENSION (ML) NASAL DAILY
Qty: 1 BOTTLE | Refills: 1 | Status: SHIPPED | OUTPATIENT
Start: 2018-06-07 | End: 2018-06-20

## 2018-06-07 ASSESSMENT — ENCOUNTER SYMPTOMS
PALPITATIONS: 0
NAUSEA: 0
SHORTNESS OF BREATH: 0
CHEST TIGHTNESS: 0
CARDIOVASCULAR NEGATIVE: 1
TROUBLE SWALLOWING: 0
MYALGIAS: 0
EYES NEGATIVE: 1
DIARRHEA: 0
CHILLS: 0
FLANK PAIN: 0
EYE DISCHARGE: 0
FEVER: 0
CONSTIPATION: 0
ADENOPATHY: 0
COUGH: 1
RHINORRHEA: 0
SORE THROAT: 0
HEADACHES: 0
NECK STIFFNESS: 0
LIGHT-HEADEDNESS: 0
ABDOMINAL PAIN: 0
HEMATURIA: 0
POLYDIPSIA: 0
EYE REDNESS: 0
FATIGUE: 0
DIZZINESS: 0
EYE ITCHING: 0
NECK PAIN: 0
WEAKNESS: 0
VOMITING: 0
FREQUENCY: 0

## 2018-06-07 ASSESSMENT — PAIN SCALES - GENERAL: PAINLEVEL: SEVERE PAIN (6)

## 2018-06-07 NOTE — MR AVS SNAPSHOT
After Visit Summary   6/7/2018    Jackie Quispe    MRN: [id number]           Patient Information     Date Of Birth          1990        Visit Information        Provider Department      6/7/2018 4:00 PM Edmund Kahn PA-C [facility]        Today's Diagnoses     Acute bronchitis with symptoms > 10 days    -  1    Acute conjunctivitis of both eyes, unspecified acute conjunctivitis type        Acute seasonal allergic rhinitis, unspecified trigger           Follow-ups after your visit        Who to contact     If you have questions or need follow up information about today's clinic visit or your schedule please contact [facility] directly at [phone].  Normal or non-critical lab and imaging results will be communicated to you by MyChart, letter or phone within 4 business days after the clinic has received the results. If you do not hear from us within 7 days, please contact the clinic through [employer] or phone. If you have a critical or abnormal lab result, we will notify you by phone as soon as possible.  Submit refill requests through [employer] or call your pharmacy and they will forward the refill request to us. Please allow 3 business days for your refill to be completed.          Additional Information About Your Visit        MyChart Information     [employer] gives you secure access to your electronic health record. If you see a primary care provider, you can also send messages to your care team and make appointments. If you have questions, please call your primary care clinic.  If you do not have a primary care provider, please call [phone] and they will assist you.        Care EveryWhere ID     This is your Care EveryWhere ID. This could be used by other organizations to access your [place] medical records  [id number]         Blood Pressure from Last 3 Encounters:   06/07/18 (P) 123/66   05/30/18 116/62   04/09/18 100/68    Weight from Last 3  Encounters:   06/07/18 (P) 119 lb 3.2 oz (54.1 kg)   05/30/18 119 lb (54 kg)   04/09/18 129 lb (58.5 kg)              Today, you had the following     No orders found for display         Today's Medication Changes          These changes are accurate as of 6/7/18  5:10 PM.  If you have any questions, ask your nurse or doctor.               Start taking these medicines.        Dose/Directions    doxycycline monohydrate 100 MG capsule   Used for:  Acute bronchitis with symptoms > 10 days, Acute conjunctivitis of both eyes, unspecified acute conjunctivitis type, Acute seasonal allergic rhinitis, unspecified trigger        Dose:  100 mg   Take 1 capsule (100 mg) by mouth 2 times daily for 14 days   Quantity:  28 capsule   Refills:  0       fluticasone 50 MCG/ACT spray   Commonly known as:  FLONASE   Used for:  Acute seasonal allergic rhinitis, unspecified trigger        Dose:  1-2 spray   Spray 1-2 sprays into both nostrils daily   Quantity:  1 Bottle   Refills:  1       olopatadine HCl 0.2 % Soln   Commonly known as:  PATADAY   Used for:  Acute conjunctivitis of both eyes, unspecified acute conjunctivitis type        Dose:  1 drop   Place 1 drop into both eyes daily   Quantity:  2.5 mL   Refills:  3            Where to get your medicines      These medications were sent to [facility] [address] [place], [place] [address]     Phone:  [phone]     doxycycline monohydrate 100 MG capsule    fluticasone 50 MCG/ACT spray    olopatadine HCl 0.2 % Soln                Primary Care Provider Office Phone # Fax #    Tammy Beth [phone] [address] [facility] [address]        Equal Access to Services     [facility] CenterNIGEL : Hadii daniel Rodríguez, cordelia jorge, qabrodie kaalkeaton julian, sunni lee. So [facility] [phone].    ATENCIÓN: Si habla español, tiene a dang disposición servicios gratuitos de asistencia  lingüísticaMarifer Jaquez al [phone].    We comply with applicable federal civil rights laws and [place] laws. We do not discriminate on the basis of race, color, national origin, age, disability, sex, sexual orientation, or gender identity.            Thank you!     Thank you for choosing [facility]  for your care. Our goal is always to provide you with excellent care. Hearing back from our patients is one way we can continue to improve our services. Please take a few minutes to complete the written survey that you may receive in the mail after your visit with us. Thank you!             Your Updated Medication List - Protect others around you: Learn how to safely use, store and throw away your medicines at www.disposemymeds.org.          This list is accurate as of 6/7/18  5:10 PM.  Always use your most recent med list.                   Brand Name Dispense Instructions for use Diagnosis    benzonatate 100 MG capsule    TESSALON PERLES    42 capsule    Take 1 capsule (100 mg) by mouth 3 times daily as needed    Acute bronchitis with symptoms > 10 days       doxycycline monohydrate 100 MG capsule     28 capsule    Take 1 capsule (100 mg) by mouth 2 times daily for 14 days    Acute bronchitis with symptoms > 10 days, Acute conjunctivitis of both eyes, unspecified acute conjunctivitis type, Acute seasonal allergic rhinitis, unspecified trigger       fluticasone 50 MCG/ACT spray    FLONASE    1 Bottle    Spray 1-2 sprays into both nostrils daily    Acute seasonal allergic rhinitis, unspecified trigger       levonorgestrel 20 MCG/24HR IUD    MIRENA    1 each    1 each (20 mcg) by Intrauterine route once 7/8/2016        neomycin-polymyxin-hydrocortisone otic solution    CORTISPORIN     Place 3 drops in ear(s) 4 times daily        olopatadine HCl 0.2 % Soln    PATADAY    2.5 mL    Place 1 drop into both eyes daily    Acute conjunctivitis of both eyes, unspecified acute conjunctivitis type

## 2018-06-07 NOTE — TELEPHONE ENCOUNTER
Has appt with [facility] same day provider today 4 PM.  LM to call clinic nurse if any questions prior to appt.  KALEIGH Bojorquez RN

## 2018-06-14 ENCOUNTER — MYC MEDICAL ADVICE (OUTPATIENT)
Dept: FAMILY MEDICINE | Facility: CLINIC | Age: 28
End: 2018-06-14

## 2018-06-14 NOTE — TELEPHONE ENCOUNTER
LM on identifiable VM advising telephone visit. To call clinic nurse back to schedule.  KALEIGH Bojorquez RN

## 2018-06-18 ENCOUNTER — MYC MEDICAL ADVICE (OUTPATIENT)
Dept: FAMILY MEDICINE | Facility: CLINIC | Age: 28
End: 2018-06-18

## 2018-06-20 ENCOUNTER — HOSPITAL ENCOUNTER (EMERGENCY)
Facility: CLINIC | Age: 28
Discharge: HOME | End: 2018-06-20
Attending: EMERGENCY MEDICINE | Admitting: EMERGENCY MEDICINE
Payer: COMMERCIAL

## 2018-06-20 VITALS
SYSTOLIC BLOOD PRESSURE: 124 MMHG | RESPIRATION RATE: 116 BRPM | HEIGHT: 68 IN | BODY MASS INDEX: 18.04 KG/M2 | OXYGEN SATURATION: 99 % | WEIGHT: 119 LBS | DIASTOLIC BLOOD PRESSURE: 78 MMHG | TEMPERATURE: 98.2 F

## 2018-06-20 DIAGNOSIS — R07.89 CHEST WALL PAIN: ICD-10-CM

## 2018-06-20 DIAGNOSIS — J42 CHRONIC BRONCHITIS, UNSPECIFIED CHRONIC BRONCHITIS TYPE (H): ICD-10-CM

## 2018-06-20 PROCEDURE — 99284 EMERGENCY DEPT VISIT MOD MDM: CPT | Mod: 25 | Performed by: EMERGENCY MEDICINE

## 2018-06-20 PROCEDURE — 93010 ELECTROCARDIOGRAM REPORT: CPT | Mod: Z6 | Performed by: EMERGENCY MEDICINE

## 2018-06-20 PROCEDURE — 93005 ELECTROCARDIOGRAM TRACING: CPT | Performed by: EMERGENCY MEDICINE

## 2018-06-20 PROCEDURE — 99281 EMR DPT VST MAYX REQ PHY/QHP: CPT

## 2018-06-20 PROCEDURE — 99283 EMERGENCY DEPT VISIT LOW MDM: CPT | Performed by: EMERGENCY MEDICINE

## 2018-06-20 NOTE — ED AVS SNAPSHOT
[facility]    [address] [facility] [address]    Phone:  [phone]    Fax:  [phone]                                       Jackie Quispe   MRN: [id number]    Department:  [facility]   Date of Visit:  6/20/2018           Patient Information     Date Of Birth          1990        Your diagnoses for this visit were:     Chronic bronchitis, unspecified chronic bronchitis type (H)     Chest wall pain        You were seen by Lance Quispe MD.        Discharge Instructions         Discharge Instructions for Chronic Bronchitis  You have been diagnosed with chronic bronchitis. With this condition, you cough up mucus and feel short of breath for 3 months or more each year for at least 2 years in a row.  Home care  Here is how you can take care of yourself at home:     If you smoke, quit. This is the best thing you can do for your bronchitis and overall health.  ? Try a stop-smoking program. There are even telephone and internet programs.  ? Ask your healthcare provider about medicines or other methods to help you quit.  ? Ask family members to quit smoking as well.  ? Don t allow smoking in your home, in your car, or around you.    Protect yourself from infection.  ? Wash your hands often. Do your best to keep your hands away from your face. Most germs are spread from your hands to your mouth or nose.  ? Ask your healthcare provider about a yearly flu vaccine and pneumonia vaccines.  ? Avoid crowds. It's especially important to do this in the winter when more people have colds and flu.  ? Take care of your overall health. That means:    Getting about 8 hours of sleep every night    Exercising for at least 30 minutes on most days    Eating lots of fresh fruits and vegetables, as well as whole grains, lean meats and fish, and low-fat dairy products. Avoiding fat- and sugar-filled foods is also important.    Limiting the amount of alcoholic  beverages you drink.    Take your medicines exactly as directed. Don t skip doses.    Talk to your healthcare provider about ways to keep your mucus thin. Drinking a lot of water helps.    Ask your healthcare provider to show you pursed-lip breathing to help decrease shortness of breath.    Find out about pulmonary rehabilitation programs in your area. Ask your provider or local hospital.  Follow-up care  Make all of your follow-up appointments as directed by our staff.  When to call your healthcare provider  Call your provider immediately if you have any of the following:    Shortness of breath, wheezing, or coughing    Increased mucus    Yellow, green, bloody, or smelly mucus    Fever or chills    Tightness in your chest that does not go away with your normal medicines    An irregular heartbeat or feeling that your heart is racing    Swollen ankles   Date Last Reviewed: 5/1/2016 2000-2017 [employer]. [address] [place] [address]. All rights reserved. This information is not intended as a substitute for professional medical care. Always follow your healthcare professional's instructions.          Viral Bronchitis (Adult)    You have a viral bronchitis. Bronchitis is inflammation and swelling of the lining of the lungs. This is often caused by an infection. Symptoms include a dry, hacking cough that is worse at night. The cough may bring up yellow-green mucus. You may also feel short of breath or wheeze. Other symptoms may include tiredness, chest discomfort, and chills.  Bronchitis that is caused by a virus is not treated with antibiotics. Instead, medicines may be given to help relieve symptoms. Symptoms can last up to 2 weeks, although the cough may last much longer.  This illness is contagious during the first few days and is spread through the air by coughing and sneezing, or by direct contact (touching the sick person and then touching your own eyes, nose, or mouth).  Most viral  illnesses resolve within 10 to 14 days with rest and simple home remedies, although they may sometimes last for several weeks.  Home care    If symptoms are severe, rest at home for the first 2 to 3 days. When you go back to your usual activities, don't let yourself get too tired.    Do not smoke. Also avoid being exposed to secondhand smoke.    You may use over-the-counter medicine to control fever or pain, unless another pain medicine was prescribed. If you have chronic liver or kidney disease or have ever had a stomach ulcer or gastrointestinal bleeding, talk with your healthcare provider before using these medicines. Also talk to your provider if you are taking medicine to prevent blood clots. Aspirin should never be given to anyone younger than 18 years of age who is ill with a viral infection or fever. It may cause severe liver or brain damage.    Your appetite may be poor, so a light diet is fine. Avoid dehydration by drinking 6 to 8 glasses of fluids per day (such as water, soft drinks, sports drinks, juices, tea, or soup). Extra fluids will help loosen secretions in the nose and lungs.    Over-the-counter cough, cold, and sore-throat medicines will not shorten the length of the illness, but they may help to reduce symptoms. Don't use decongestants if you have high blood pressure.  Follow-up care  Follow up with your healthcare provider, or as advised. If you had an X-ray or ECG (electrocardiogram), a specialist will review it. You will be notified of any new findings that may affect your care.  If you are age 65 or older, or if you have a chronic lung disease or condition that affects your immune system, or you smoke, ask your healthcare provider about getting a pneumococcal vaccine and a yearly flu shot (influenza vaccine).  When to seek medical advice  Call your healthcare provider right away if any of these occur:    Fever of 100.4 F (38 C) or higher, or as directed by your healthcare  provider    Coughing up increased amounts of colored sputum    Weakness, drowsiness, headache, facial pain, ear pain, or a stiff neck  Call 911  Call 911 if any of these occur:    Coughing up blood    Worsening weakness, drowsiness, headache, or stiff neck    Trouble breathing, wheezing, or pain with breathing  Date Last Reviewed: 9/13/2015 2000-2017 [employer]. [address] [place] [address]. All rights reserved. This information is not intended as a substitute for professional medical care. Always follow your healthcare professional's instructions.          Chest Wall Pain: Costochondritis    The chest pain that you have had today is caused by costochondritis. This condition is caused by an inflammation of the cartilage joining your ribs to your breastbone. It is not caused by heart or lung problems. Your healthcare team has made sure that the chest pain you feel is not from a life threatening cause of chest pain such as heart attack, collapsed lung, blood clot in the lung, tear in the aorta, or esophageal rupture. The inflammation may have been brought on by a blow to the chest, lifting heavy objects, intense exercise, or an illness that made you cough and sneeze a lot. It often occurs during times of emotional stress. It can be painful, but it is not dangerous. It usually goes away in 1 to 2 weeks. But it may happen again. Rarely, a more serious condition may cause symptoms similar to costochondritis. That s why it s important to watch for the warning signs listed below.  Home care  Follow these guidelines when caring for yourself at home:    If you feel that emotional stress is a cause of your condition, try to figure out the sources of that stress. It may not be obvious. Learn ways to deal with the stress in your life. This can include regular exercise, muscle relaxation, meditation, or simply taking time out for yourself.    You may use acetaminophen, ibuprofen, or naproxen to  control pain, unless another pain medicine was prescribed. If you have liver or kidney disease or ever had a stomach ulcer, talk with your healthcare provider before using these medicines.    You can also help ease pain by using a hot, wet compress or heating pad. Use this with or without a medicated skin cream that helps relieves pain.    Do stretching exercise as advised by your provider.    Take any prescribed medicines as directed.  Follow-up care  Follow up with your healthcare provider, or as advised, if you do not start to get better in the next 2 days.  When to seek medical advice  Call your healthcare provider right away if any of these occur:    A change in the type of pain. Call if it feels different, becomes more serious, lasts longer, or spreads into your shoulder, arm, neck, jaw, or back.    Shortness of breath or pain gets worse when you breathe    Weakness, dizziness, or fainting    Cough with dark-colored sputum (phlegm) or blood    Abdominal pain    Dark red or black stools    Fever of 100.4 F (38 C) or higher, or as directed by your healthcare provider  Date Last Reviewed: 12/1/2016 2000-2017 [employer]. [address] [place] [address]. All rights reserved. This information is not intended as a substitute for professional medical care. Always follow your healthcare professional's instructions.      Avoid smoking, prednisone as prescribed, Tylenol 1000 mg every 4-6 hours as needed for discomfort.  Follow-up primary care for further evaluation.    24 Hour Appointment Hotline       To make an appointment at any [facility], call [phone] ([phone]). If you don't have a family doctor or clinic, we will help you find one. [place] clinics are conveniently located to serve the needs of you and your family.             Review of your medicines      Our records show that you are taking the medicines listed below. If these are incorrect, please call your family  doctor or clinic.        Dose / Directions Last dose taken    doxycycline monohydrate 100 MG capsule   Dose:  100 mg   Quantity:  28 capsule        Take 1 capsule (100 mg) by mouth 2 times daily for 14 days   Refills:  0        IBUPROFEN PO   Dose:  800 mg        Take 800 mg by mouth every 8 hours as needed for moderate pain   Refills:  0        levonorgestrel 20 MCG/24HR IUD   Commonly known as:  MIRENA   Dose:  1 each   Quantity:  1 each        1 each (20 mcg) by Intrauterine route once 7/8/2016   Refills:  0        olopatadine HCl 0.2 % Soln   Commonly known as:  PATADAY   Dose:  1 drop   Quantity:  2.5 mL        Place 1 drop into both eyes daily   Refills:  3                Procedures and tests performed during your visit     EKG 12 lead      Orders Needing Specimen Collection     None      Pending Results     No orders found from 6/18/2018 to 6/21/2018.            Pending Culture Results     No orders found from 6/18/2018 to 6/21/2018.            Pending Results Instructions     If you had any lab results that were not finalized at the time of your Discharge, you can call the ED Lab Result RN at [phone]. You will be contacted by this team for any positive Lab results or changes in treatment. The nurses are available 7 days a week from 10A to 6:30P.  You can leave a message 24 hours per day and they will return your call.        Test Results From Your Hospital Stay               Thank you for choosing [facility]       Thank you for choosing [facility] for your care. Our goal is always to provide you with excellent care. Hearing back from our patients is one way we can continue to improve our services. Please take a few minutes to complete the written survey that you may receive in the mail after you visit with us. Thank you!        [employer] Information     [employer] gives you secure access to your electronic health record. If you see a primary care provider, you can also send messages to your care team and make  appointments. If you have questions, please call your primary care clinic.  If you do not have a primary care provider, please call [phone] and they will assist you.        Care EveryWhere ID     This is your Care EveryWhere ID. This could be used by other organizations to access your [facility] medical records  [id number]        Equal Access to Services     DILMA QUINONES : Nikole Rodríguez, cordelia jorge, sunni adam. So [facility] [phone].    ATENCIÓN: Si habla español, tiene a dang disposición servicios gratuitos de asistencia lingüística. Llame al [phone].    We comply with applicable federal civil rights laws and [place] laws. We do not discriminate on the basis of race, color, national origin, age, disability, sex, sexual orientation, or gender identity.            After Visit Summary       This is your record. Keep this with you and show to your community pharmacist(s) and doctor(s) at your next visit.

## 2018-06-20 NOTE — ED AVS SNAPSHOT
[facility] Emergency Department    [address] [facility] [address]    Phone:  [phone]    Fax:  [phone]                                       Jackie Quispe   MRN: [id number]    Department:  [facility] Emergency Department   Date of Visit:  6/20/2018           After Visit Summary Signature Page     I have received my discharge instructions, and my questions have been answered. I have discussed any challenges I see with this plan with the nurse or doctor.    ..........................................................................................................................................  Patient/Patient Representative Signature      ..........................................................................................................................................  Patient Representative Print Name and Relationship to Patient    ..................................................               ................................................  Date                                            Time    ..........................................................................................................................................  Reviewed by Signature/Title    ...................................................              ..............................................  Date                                                            Time

## 2018-06-20 NOTE — DISCHARGE INSTRUCTIONS
Discharge Instructions for Chronic Bronchitis  You have been diagnosed with chronic bronchitis. With this condition, you cough up mucus and feel short of breath for 3 months or more each year for at least 2 years in a row.  Home care  Here is how you can take care of yourself at home:     If you smoke, quit. This is the best thing you can do for your bronchitis and overall health.  ? Try a stop-smoking program. There are even telephone and internet programs.  ? Ask your healthcare provider about medicines or other methods to help you quit.  ? Ask family members to quit smoking as well.  ? Don t allow smoking in your home, in your car, or around you.    Protect yourself from infection.  ? Wash your hands often. Do your best to keep your hands away from your face. Most germs are spread from your hands to your mouth or nose.  ? Ask your healthcare provider about a yearly flu vaccine and pneumonia vaccines.  ? Avoid crowds. It's especially important to do this in the winter when more people have colds and flu.  ? Take care of your overall health. That means:    Getting about 8 hours of sleep every night    Exercising for at least 30 minutes on most days    Eating lots of fresh fruits and vegetables, as well as whole grains, lean meats and fish, and low-fat dairy products. Avoiding fat- and sugar-filled foods is also important.    Limiting the amount of alcoholic beverages you drink.    Take your medicines exactly as directed. Don t skip doses.    Talk to your healthcare provider about ways to keep your mucus thin. Drinking a lot of water helps.    Ask your healthcare provider to show you pursed-lip breathing to help decrease shortness of breath.    Find out about pulmonary rehabilitation programs in your area. Ask your provider or local hospital.  Follow-up care  Make all of your follow-up appointments as directed by our staff.  When to call your healthcare provider  Call your provider immediately if you have any of  the following:    Shortness of breath, wheezing, or coughing    Increased mucus    Yellow, green, bloody, or smelly mucus    Fever or chills    Tightness in your chest that does not go away with your normal medicines    An irregular heartbeat or feeling that your heart is racing    Swollen ankles   Date Last Reviewed: 5/1/2016 2000-2017 [employer]. [address] [place] [address]. All rights reserved. This information is not intended as a substitute for professional medical care. Always follow your healthcare professional's instructions.          Viral Bronchitis (Adult)    You have a viral bronchitis. Bronchitis is inflammation and swelling of the lining of the lungs. This is often caused by an infection. Symptoms include a dry, hacking cough that is worse at night. The cough may bring up yellow-green mucus. You may also feel short of breath or wheeze. Other symptoms may include tiredness, chest discomfort, and chills.  Bronchitis that is caused by a virus is not treated with antibiotics. Instead, medicines may be given to help relieve symptoms. Symptoms can last up to 2 weeks, although the cough may last much longer.  This illness is contagious during the first few days and is spread through the air by coughing and sneezing, or by direct contact (touching the sick person and then touching your own eyes, nose, or mouth).  Most viral illnesses resolve within 10 to 14 days with rest and simple home remedies, although they may sometimes last for several weeks.  Home care    If symptoms are severe, rest at home for the first 2 to 3 days. When you go back to your usual activities, don't let yourself get too tired.    Do not smoke. Also avoid being exposed to secondhand smoke.    You may use over-the-counter medicine to control fever or pain, unless another pain medicine was prescribed. If you have chronic liver or kidney disease or have ever had a stomach ulcer or gastrointestinal bleeding,  talk with your healthcare provider before using these medicines. Also talk to your provider if you are taking medicine to prevent blood clots. Aspirin should never be given to anyone younger than 18 years of age who is ill with a viral infection or fever. It may cause severe liver or brain damage.    Your appetite may be poor, so a light diet is fine. Avoid dehydration by drinking 6 to 8 glasses of fluids per day (such as water, soft drinks, sports drinks, juices, tea, or soup). Extra fluids will help loosen secretions in the nose and lungs.    Over-the-counter cough, cold, and sore-throat medicines will not shorten the length of the illness, but they may help to reduce symptoms. Don't use decongestants if you have high blood pressure.  Follow-up care  Follow up with your healthcare provider, or as advised. If you had an X-ray or ECG (electrocardiogram), a specialist will review it. You will be notified of any new findings that may affect your care.  If you are age 65 or older, or if you have a chronic lung disease or condition that affects your immune system, or you smoke, ask your healthcare provider about getting a pneumococcal vaccine and a yearly flu shot (influenza vaccine).  When to seek medical advice  Call your healthcare provider right away if any of these occur:    Fever of 100.4 F (38 C) or higher, or as directed by your healthcare provider    Coughing up increased amounts of colored sputum    Weakness, drowsiness, headache, facial pain, ear pain, or a stiff neck  Call 911  Call 911 if any of these occur:    Coughing up blood    Worsening weakness, drowsiness, headache, or stiff neck    Trouble breathing, wheezing, or pain with breathing  Date Last Reviewed: 9/13/2015 2000-2017 [employer]. [address] [place] [address], [place] [address]. All rights reserved. This information is not intended as a substitute for professional medical care. Always follow your healthcare professional's  instructions.          Chest Wall Pain: Costochondritis    The chest pain that you have had today is caused by costochondritis. This condition is caused by an inflammation of the cartilage joining your ribs to your breastbone. It is not caused by heart or lung problems. Your healthcare team has made sure that the chest pain you feel is not from a life threatening cause of chest pain such as heart attack, collapsed lung, blood clot in the lung, tear in the aorta, or esophageal rupture. The inflammation may have been brought on by a blow to the chest, lifting heavy objects, intense exercise, or an illness that made you cough and sneeze a lot. It often occurs during times of emotional stress. It can be painful, but it is not dangerous. It usually goes away in 1 to 2 weeks. But it may happen again. Rarely, a more serious condition may cause symptoms similar to costochondritis. That s why it s important to watch for the warning signs listed below.  Home care  Follow these guidelines when caring for yourself at home:    If you feel that emotional stress is a cause of your condition, try to figure out the sources of that stress. It may not be obvious. Learn ways to deal with the stress in your life. This can include regular exercise, muscle relaxation, meditation, or simply taking time out for yourself.    You may use acetaminophen, ibuprofen, or naproxen to control pain, unless another pain medicine was prescribed. If you have liver or kidney disease or ever had a stomach ulcer, talk with your healthcare provider before using these medicines.    You can also help ease pain by using a hot, wet compress or heating pad. Use this with or without a medicated skin cream that helps relieves pain.    Do stretching exercise as advised by your provider.    Take any prescribed medicines as directed.  Follow-up care  Follow up with your healthcare provider, or as advised, if you do not start to get better in the next 2 days.  When to  seek medical advice  Call your healthcare provider right away if any of these occur:    A change in the type of pain. Call if it feels different, becomes more serious, lasts longer, or spreads into your shoulder, arm, neck, jaw, or back.    Shortness of breath or pain gets worse when you breathe    Weakness, dizziness, or fainting    Cough with dark-colored sputum (phlegm) or blood    Abdominal pain    Dark red or black stools    Fever of 100.4 F (38 C) or higher, or as directed by your healthcare provider  Date Last Reviewed: 12/1/2016 2000-2017 [employer]. [address] [place] [id number]. All rights reserved. This information is not intended as a substitute for professional medical care. Always follow your healthcare professional's instructions.      Avoid smoking, prednisone as prescribed, Tylenol 1000 mg every 4-6 hours as needed for discomfort.  Follow-up primary care for further evaluation.

## 2018-06-20 NOTE — TELEPHONE ENCOUNTER
S-(situation): dyspnea, wheezing, sharp rib and back pain, coughing    B-(background): Patient has been seen in clinic for bronchitis since April this year, recently in [facility] on 6-7-18 at [facility]    A-(assessment): Patient call transferred to the nurse this morning. Patient says is having severe pain with breathing, not turning blue, has rib pain radiating to the breast and back that is described as sharp and is causing the patient to have pain with regular breathing and very difficult to take a deep breath in, says is wheezing, coughing up brownish secretions. Says this started as a sinus infection and is not getting better after prednisone or antibiotics.     R-(recommendations): Due to worsening symptoms, advised the patient to seek the ER at [place], patient agrees with the plan and will go there now.    ZULMA Parekh

## 2018-06-22 NOTE — ED PROVIDER NOTES
History     Chief Complaint   Patient presents with     Chest Pain     right side chest pain for 3 months, started with sinus infection. had workup , dx with pleuresy. sharp pain with breathing      HPI  Jackie Quispe is a 28 year old female who presents with 3 months of rib pain and cough.  Patient developed left-sided rib pain 3 months ago with upper respiratory symptoms, cough productive of sputum at that time, symptoms of waxed and waned since then, discomfort is now on the right side described as sharp, anterior and inferior chest.  She did have 5 day course of prednisone which gave her some transient relief and had a negative chest x-ray at the end of May.  She is having trouble sleeping secondary to discomfort, it is much worse with any movement or cough.  She had nausea this morning.  She describes some intermittent wheezing.  She smokes pack cigarettes per day, daily marijuana use, occasional alcohol.  Paternal grandfather with asthma.    Problem List:    Patient Active Problem List    Diagnosis Date Noted     Rib pain 05/30/2018     Priority: Medium     Mirena IUD- remove by 3/2022 03/02/2018     Priority: Medium     Lot: [id number]  Exp: 09/2020    Otilia Hargrove LPN         CARDIOVASCULAR SCREENING; LDL GOAL LESS THAN 160 01/10/2018     Priority: Medium     FIDELIA (generalized anxiety disorder) 11/06/2017     Priority: Medium     Dysfunctional uterine bleeding 09/13/2016     Priority: Medium     Acne 11/16/2012     Priority: Medium     Smoker 05/08/2012     Priority: Medium        Past Medical History:    Past Medical History:   Diagnosis Date     Chickenpox      Shingles        Past Surgical History:    Past Surgical History:   Procedure Laterality Date     MOUTH SURGERY      wisdom teeth       Family History:    Family History   Problem Relation Age of Onset     Alcohol/Drug Maternal Grandmother      alcohol     Hypertension Paternal Grandfather      Cancer Paternal Grandfather      HEART DISEASE  "Paternal Grandfather      Cancer Maternal Grandfather      lung     Alzheimer Disease Paternal Grandmother      Family History Negative Mother      Cardiovascular Father 54     MI     HEART DISEASE Father        Social History:  Marital Status:  Single [1]  Social History   Substance Use Topics     Smoking status: Current Every Day Smoker     Packs/day: 0.50     Types: Cigarettes     Smokeless tobacco: Never Used      Comment: smoking  10 cig/day     Alcohol use Yes      Comment: Occas- quit with pregnacny        Medications:      IBUPROFEN PO   levonorgestrel (MIRENA) 20 MCG/24HR IUD   olopatadine HCl (PATADAY) 0.2 % SOLN         Review of Systems  All other systems reviewed and are negative.      Physical Exam   BP: 124/78  Heart Rate: 85  Temp: 98.2  F (36.8  C)  Resp: (!) 116  Height: 172.7 cm (5' 8\")  Weight: 54 kg (119 lb)  SpO2: 99 %      Physical Exam  Nontoxic appearing no respiratory distress alert and oriented ×3  Head atraumatic normocephalic  TMs/EACs unremarkable, conjunctiva noninjected, oropharynx moist without lesions or erythema  No cervical adenopathy   Neck supple full active painless range of motion  Lungs clear to auscultation  Right anterior inferior chest wall tenderness without associated rash, no bony step-off, reproduces pain  Heart regular no murmur  Abdomen soft nontender bowel sounds positive no masses or HSM  Strength and sensation grossly intact throughout the extremities, gait and station normal  Speech is fluent, good eye contact, thought processes are rational  Lower extremities without swelling, redness or tenderness  Pedal pulses symmetrical and strong    ED Course     ED Course     Procedures             ECG: Normal rate and rhythm, axis and intervals within normal limits, no acute ST or T wave changes. Read by Dr. Lance Quispe    Critical Care time:  none               No results found for this or any previous visit (from the past 24 hour(s)).    Medications - No data to " display    Assessments & Plan (with Medical Decision Making)  28-year-old female smoker presents with chest wall pain and tenderness.  Details per HPI.  No indication for for further evaluation or workup.  Breath sounds symmetrical and full, no rales rhonchi.  Prednisone 40 mg daily and taper down to 10 mg daily.  Continue albuterol inhaler as needed.  Encouraged to stop smoking.  Return criteria reviewed     I have reviewed the nursing notes.    I have reviewed the findings, diagnosis, plan and need for follow up with the patient.       Discharge Medication List as of 6/20/2018 11:03 AM          Final diagnoses:   Chronic bronchitis, unspecified chronic bronchitis type (H)   Chest wall pain       6/20/2018   [facility] EMERGENCY DEPARTMENT     Lance Quispe MD  06/22/18 [id number]       Lance Quispe MD  06/26/18 [id number]

## 2018-07-05 ENCOUNTER — MYC MEDICAL ADVICE (OUTPATIENT)
Dept: FAMILY MEDICINE | Facility: CLINIC | Age: 28
End: 2018-07-05

## 2018-07-06 NOTE — TELEPHONE ENCOUNTER
Patient says she set up an appointment on 7-11-18 and was told in ED to follow up if not better, says that she has sharp chest pain on the right side every time she breaths, but is tolerable, says is still coughing but is not in any emergent situation. Advised that if symptoms worsen to seek the ER again, otherwise may discuss the CT with provider at upcoming appointment and says wants to quit smoking too. Told the patient to discuss this at appointment, placed to discuss smoking cessation on edit notes as well.

## 2018-09-24 ENCOUNTER — OFFICE VISIT (OUTPATIENT)
Dept: FAMILY MEDICINE | Facility: CLINIC | Age: 28
End: 2018-09-24
Payer: COMMERCIAL

## 2018-09-24 VITALS
WEIGHT: 125 LBS | SYSTOLIC BLOOD PRESSURE: 132 MMHG | DIASTOLIC BLOOD PRESSURE: 68 MMHG | TEMPERATURE: 98.6 F | RESPIRATION RATE: 16 BRPM | BODY MASS INDEX: 19.01 KG/M2 | HEART RATE: 88 BPM

## 2018-09-24 DIAGNOSIS — F17.200 SMOKER: Primary | ICD-10-CM

## 2018-09-24 DIAGNOSIS — L70.8 INFLAMMATORY ACNE: ICD-10-CM

## 2018-09-24 DIAGNOSIS — Z23 NEED FOR PROPHYLACTIC VACCINATION AND INOCULATION AGAINST INFLUENZA: ICD-10-CM

## 2018-09-24 DIAGNOSIS — Z13.29 SCREENING FOR THYROID DISORDER: ICD-10-CM

## 2018-09-24 LAB
T4 FREE SERPL-MCNC: 0.89 NG/DL (ref 0.76–1.46)
TSH SERPL DL<=0.005 MIU/L-ACNC: 0.21 MU/L (ref 0.4–4)

## 2018-09-24 PROCEDURE — 90686 IIV4 VACC NO PRSV 0.5 ML IM: CPT | Performed by: NURSE PRACTITIONER

## 2018-09-24 PROCEDURE — 99214 OFFICE O/P EST MOD 30 MIN: CPT | Mod: 25 | Performed by: NURSE PRACTITIONER

## 2018-09-24 PROCEDURE — 90471 IMMUNIZATION ADMIN: CPT | Performed by: NURSE PRACTITIONER

## 2018-09-24 PROCEDURE — 84439 ASSAY OF FREE THYROXINE: CPT | Performed by: NURSE PRACTITIONER

## 2018-09-24 PROCEDURE — 84443 ASSAY THYROID STIM HORMONE: CPT | Performed by: NURSE PRACTITIONER

## 2018-09-24 PROCEDURE — 36415 COLL VENOUS BLD VENIPUNCTURE: CPT | Performed by: NURSE PRACTITIONER

## 2018-09-24 RX ORDER — DOXYCYCLINE 100 MG/1
100 CAPSULE ORAL 2 TIMES DAILY
Qty: 60 CAPSULE | Refills: 1 | Status: SHIPPED | OUTPATIENT
Start: 2018-09-24 | End: 2018-12-27

## 2018-09-24 RX ORDER — VARENICLINE TARTRATE 1 MG/1
1 TABLET, FILM COATED ORAL 2 TIMES DAILY
Qty: 56 TABLET | Refills: 3 | Status: SHIPPED | OUTPATIENT
Start: 2018-09-24 | End: 2018-12-27

## 2018-09-24 NOTE — PROGRESS NOTES

## 2018-09-24 NOTE — MR AVS SNAPSHOT
After Visit Summary   9/24/2018    Jackie Quispe    MRN: [id number]           Patient Information     Date Of Birth          1990        Visit Information        Provider Department      9/24/2018 4:00 PM Ernestine Campos NP [facility]        Today's Diagnoses     Smoker    -  1    Inflammatory acne        Screening for thyroid disorder          Care Instructions      1. Smoker  Start chantix   Consider nicotine replacement with gum, buy OVER THE COUNTER     - varenicline (CHANTIX STARTING MONTH PAK) 0.5 MG X 11 & 1 MG X 42 tablet; Take 0.5 mg tab daily for 3 days, then 0.5 mg tab twice daily for 4 days, then 1 mg twice daily.  Dispense: 53 tablet; Refill: 0  - varenicline (CHANTIX) 1 MG tablet; Take 1 tablet (1 mg) by mouth 2 times daily  Dispense: 56 tablet; Refill: 3    2. Inflammatory acne  Will treat with systemic antibiotic   Use until clear   Follow up if not improved in 4-6 weeks   - doxycycline monohydrate 100 MG capsule; Take 1 capsule (100 mg) by mouth 2 times daily  Dispense: 60 capsule; Refill: 1    Flu shot today     Thyroid screening     Varenicline oral tablets  Brand Name: Chantix  What is this medicine?  VARENICLINE (addie EN i kleen) is used to help people quit smoking. It can reduce the symptoms caused by stopping smoking. It is used with a patient support program recommended by your physician.  How should I use this medicine?  Take this medicine by mouth after eating. Take with a full glass of water. Follow the directions on the prescription label. Take your doses at regular intervals. Do not take your medicine more often than directed.  There are 3 ways you can use this medicine to help you quit smoking; talk to your health care professional to decide which plan is right for you:  1) you can choose a quit date and start this medicine 1 week before the quit date, or,  2) you can start taking this medicine before you choose a quit date, and then pick a quit  date between day 8 and 35 days of treatment, or,  3) if you are not sure that you are able or willing to quit smoking right away, start taking this medicine and slowly decrease the amount you smoke as directed by your health care professional with the goal of being cigarette-free by week 12 of treatment.  Stick to your plan; ask about support groups or other ways to help you remain cigarette-free. If you are motivated to quit smoking and did not succeed during a previous attempt with this medicine for reasons other than side effects, or if you returned to smoking after this treatment, speak with your health care professional about whether another course of this medicine may be right for you.  A special MedGuide will be given to you by the pharmacist with each prescription and refill. Be sure to read this information carefully each time.  Talk to your pediatrician regarding the use of this medicine in children. This medicine is not approved for use in children.  What side effects may I notice from receiving this medicine?  Side effects that you should report to your doctor or health care professional as soon as possible:    allergic reactions like skin rash, itching or hives, swelling of the face, lips, tongue, or throat    acting aggressive, being angry or violent, or acting on dangerous impulses    breathing problems    changes in vision    chest pain or chest tightness    confusion, trouble speaking or understanding    new or worsening depression, anxiety, or panic attacks    extreme increase in activity and talking (eneida)    fast, irregular heartbeat    feeling faint or lightheaded, falls    fever    pain in legs when walking    problems with balance, talking, walking    redness, blistering, peeling or loosening of the skin, including inside the mouth    ringing in ears    seeing or hearing things that aren't there (hallucinations)    seizures    sleepwalking    sudden numbness or weakness of the face, arm or  leg    thoughts about suicide or dying, or attempts to commit suicide    trouble passing urine or change in the amount of urine    unusual bleeding or bruising    unusually weak or tired  Side effects that usually do not require medical attention (report to your doctor or health care professional if they continue or are bothersome):    constipation    headache    nausea, vomiting    strange dreams    stomach gas    trouble sleeping  What may interact with this medicine?    alcohol or any product that contains alcohol    insulin    other stop smoking aids    theophylline    warfarin    What if I miss a dose?  If you miss a dose, take it as soon as you can. If it is almost time for your next dose, take only that dose. Do not take double or extra doses.  Where should I keep my medicine?  Keep out of the reach of children.  Store at room temperature between 15 and 30 degrees C (59 and 86 degrees F). Throw away any unused medicine after the expiration date.  What should I tell my health care provider before I take this medicine?  They need to know if you have any of these conditions:    bipolar disorder, depression, schizophrenia or other mental illness    heart disease    if you often drink alcohol    kidney disease    peripheral vascular disease    seizures    stroke    suicidal thoughts, plans, or attempt; a previous suicide attempt by you or a family member    an unusual or allergic reaction to varenicline, other medicines, foods, dyes, or preservatives    pregnant or trying to get pregnant    breast-feeding  What should I watch for while using this medicine?  Visit your doctor or health care professional for regular check ups. Ask for ongoing advice and encouragement from your doctor or healthcare professional, friends, and family to help you quit. If you smoke while on this medication, quit again  Your mouth may get dry. Chewing sugarless gum or sucking hard candy, and drinking plenty of water may help. Contact  your doctor if the problem does not go away or is severe.  You may get drowsy or dizzy. Do not drive, use machinery, or do anything that needs mental alertness until you know how this medicine affects you. Do not stand or sit up quickly, especially if you are an older patient. This reduces the risk of dizzy or fainting spells.  Sleepwalking can happen during treatment with this medicine, and can sometimes lead to behavior that is harmful to you, other people, or property. Stop taking this medicine and tell your doctor if you start sleepwalking or have other unusual sleep-related activity.  Decrease the amount of alcoholic beverages that you drink during treatment with this medicine until you know if this medicine affects your ability to tolerate alcohol. Some people have experienced increased drunkenness (intoxication), unusual or sometimes aggressive behavior, or no memory of things that have happened (amnesia) during treatment with this medicine.  The use of this medicine may increase the chance of suicidal thoughts or actions. Pay special attention to how you are responding while on this medicine. Any worsening of mood, or thoughts of suicide or dying should be reported to your health care professional right away.  NOTE:This sheet is a summary. It may not cover all possible information. If you have questions about this medicine, talk to your doctor, pharmacist, or health care provider. Copyright  2018 [employer]        How to Quit Smoking  Smoking is one of the hardest habits to break. About half of all people who have ever smoked have been able to quit. Most people who still smoke want to quit. Here are some of the best ways to stop smoking.    Keep trying  Most smokers make many attempts at quitting before they are successful. It s important not to give up.  Go cold turkey  Most former smokers quit cold turkey (all at once). Trying to cut back gradually doesn't seem to work as well, perhaps because it continues  the smoking habit. Also, it is possible to inhale more while smoking fewer cigarettes. This results in the same amount of nicotine in your body.  Get support  Support programs can be a big help, especially for heavy smokers. These groups offer lectures, ways to change behavior, and peer support. Here are some ways to find a support program:    Free national quitline: 800-QUIT-NOW (448-739-4519).    Hospital quit-smoking programs.    American Lung Association: (648.983.5321).    American Cancer Society (815-487-4712).  Support at home is important too. Nonsmokers can offer praise and encouragement. If the smoker in your life finds it hard to quit, encourage them to keep trying.  Over-the-counter medicines  Nicotine replacement therapy may make quitting easier. Certain aids, such as the nicotine patch, gum, and lozenges, are available without a prescription. It is best to use these under a doctor s care, though. The skin patch provides a steady supply of nicotine. Nicotine gum and lozenges give temporary bursts of low levels of nicotine. Both methods reduce the craving for cigarettes. Warning: If you have nausea, vomiting, dizziness, weakness, or a fast heartbeat, stop using these products and see your doctor.  Prescription medicines  After reviewing your smoking patterns and past attempts to quit, your doctor may offer a prescription medicine such as bupropion, varenicline, a nicotine inhaler, or nasal spray. Each has advantages and side effects. Your doctor can review these with you.  Health benefits of quitting  The benefits of quitting start right away and keep improving the longer you go without smoking. These benefits occur at any age.  So whether you are 17 or 70, quitting is a good decision. Some of the benefits include:    20 minutes: Blood pressure and pulse return to normal.    8 hours: Oxygen levels return to normal.    2 days: Ability to smell and taste begin to improve as damaged nerves regrow.    2 to 3  "weeks: Circulation and lung function improve.    1 to 9 months: Coughing, congestion, and shortness of breath decrease; tiredness decreases.    1 year: Risk of heart attack decreases by half.    5 years: Risk of lung cancer decreases by half; risk of stroke becomes the same as a nonsmoker s.  For more on how to quit smoking, try these online resources:     Smokefree.gov    \"Clearing the Air\" booklet from the National Cancer Coin: smokefree.gov/sites/default/files/pdf/clearing-the-air-accessible.pdf  Date Last Reviewed: 3/1/2017    [id number] [employer]. [address] [place] [address]. All rights reserved. This information is not intended as a substitute for professional medical care. Always follow your healthcare professional's instructions.                Follow-ups after your visit        Who to contact     If you have questions or need follow up information about today's clinic visit or your schedule please contact [facility] directly at [phone].  Normal or non-critical lab and imaging results will be communicated to you by [employer], letter or phone within 4 business days after the clinic has received the results. If you do not hear from us within 7 days, please contact the clinic through [employer] or phone. If you have a critical or abnormal lab result, we will notify you by phone as soon as possible.  Submit refill requests through [id number] or call your pharmacy and they will forward the refill request to us. Please allow 3 business days for your refill to be completed.          Additional Information About Your Visit        [id number] Information     [id number] gives you secure access to your electronic health record. If you see a primary care provider, you can also send messages to your care team and make appointments. If you have questions, please call your primary care clinic.  If you do not have a primary care provider, please call [phone] and they will assist " you.        Care EveryWhere ID     This is your Care EveryWhere ID. This could be used by other organizations to access your [place] medical records  [id number]        Your Vitals Were     Pulse Temperature Respirations Last Period BMI (Body Mass Index)       88 98.6  F (37  C) (Tympanic) 16 08/27/2018 (Approximate) 19.01 kg/m2        Blood Pressure from Last 3 Encounters:   09/24/18 132/68   06/20/18 124/78   06/07/18 (P) 123/66    Weight from Last 3 Encounters:   09/24/18 125 lb (56.7 kg)   06/20/18 119 lb (54 kg)   06/07/18 (P) 119 lb 3.2 oz (54.1 kg)              We Performed the Following     TSH with free T4 reflex          Today's Medication Changes          These changes are accurate as of 9/24/18  4:06 PM.  If you have any questions, ask your nurse or doctor.               Start taking these medicines.        Dose/Directions    doxycycline monohydrate 100 MG capsule   Used for:  Inflammatory acne   Started by:  Ernestine Campos NP        Dose:  100 mg   Take 1 capsule (100 mg) by mouth 2 times daily   Quantity:  60 capsule   Refills:  1       * varenicline 0.5 MG X 11 & 1 MG X 42 tablet   Commonly known as:  CHANTIX STARTING MONTH PAK   Used for:  Smoker   Started by:  Ernestine Campos NP        Take 0.5 mg tab daily for 3 days, then 0.5 mg tab twice daily for 4 days, then 1 mg twice daily.   Quantity:  53 tablet   Refills:  0       * varenicline 1 MG tablet   Commonly known as:  CHANTIX   Used for:  Smoker   Started by:  Ernestine Campos NP        Dose:  1 mg   Take 1 tablet (1 mg) by mouth 2 times daily   Quantity:  56 tablet   Refills:  3       * Notice:  This list has 2 medication(s) that are the same as other medications prescribed for you. Read the directions carefully, and ask your doctor or other care provider to review them with you.         Where to get your medicines      These medications were sent to [id number] - [place], [place] - [address]  [address] [facility]  [address]     Phone:  [phone]     doxycycline monohydrate 100 MG capsule    varenicline 0.5 MG X 11 & 1 MG X 42 tablet    varenicline 1 MG tablet                Primary Care Provider Office Phone # Fax #    Tammy Beth [phone] [address] [facility] [address]        Equal Access to Services     [facility]: Hadii aad ku hadasho Soomaali, waaxda luqadaha, qaybta kaalmada adeegyada, waxay idiin hayaan adeeg khdomingosh laelvern . So [facility] [phone].    ATENCIÓN: Si habla español, tiene a dang disposición servicios gratuitos de asistencia lingüística. Avivaame al [phone].    We comply with applicable federal civil rights laws and [place] laws. We do not discriminate on the basis of race, color, national origin, age, disability, sex, sexual orientation, or gender identity.            Thank you!     Thank you for choosing [facility]  for your care. Our goal is always to provide you with excellent care. Hearing back from our patients is one way we can continue to improve our services. Please take a few minutes to complete the written survey that you may receive in the mail after your visit with us. Thank you!             Your Updated Medication List - Protect others around you: Learn how to safely use, store and throw away your medicines at www.disposemymeds.org.          This list is accurate as of 9/24/18  4:06 PM.  Always use your most recent med list.                   Brand Name Dispense Instructions for use Diagnosis    doxycycline monohydrate 100 MG capsule     60 capsule    Take 1 capsule (100 mg) by mouth 2 times daily    Inflammatory acne       levonorgestrel 20 MCG/24HR IUD    MIRENA    1 each    1 each (20 mcg) by Intrauterine route once 7/8/2016        * varenicline 0.5 MG X 11 & 1 MG X 42 tablet    CHANTIX STARTING MONTH JANA    53 tablet    Take 0.5 mg tab daily for 3 days, then 0.5 mg tab twice daily for 4 days, then 1 mg twice daily.    Smoker       *  varenicline 1 MG tablet    CHANTIX    56 tablet    Take 1 tablet (1 mg) by mouth 2 times daily    Smoker       * Notice:  This list has 2 medication(s) that are the same as other medications prescribed for you. Read the directions carefully, and ask your doctor or other care provider to review them with you.

## 2018-09-24 NOTE — PROGRESS NOTES
SUBJECTIVE:   Jackie Quispe is a 28 year old female who presents to clinic today for the following health issues:    Stop Smoking: Patient states she would like to start Chantix. Patient states she has heard good and bad thing but thinks she should try it.    Quit smoking for 3 montsh with patches    Smokes 1 PPD , 11 years ago     Would like thyroid checked     Reports that acne has worsened and the past few months   Has tried several OVER THE COUNTER products with relief       Problem list and histories reviewed & adjusted, as indicated.  Additional history: as documented    Patient Active Problem List   Diagnosis     Smoker     Acne     Dysfunctional uterine bleeding     FIDELIA (generalized anxiety disorder)     CARDIOVASCULAR SCREENING; LDL GOAL LESS THAN 160     Mirena IUD- remove by 3/2022     Rib pain     Past Surgical History:   Procedure Laterality Date     MOUTH SURGERY      wisdom teeth       Social History   Substance Use Topics     Smoking status: Current Every Day Smoker     Packs/day: 0.50     Types: Cigarettes     Smokeless tobacco: Never Used      Comment: smoking  10 cig/day     Alcohol use Yes      Comment: Occas- quit with pregnacny     Family History   Problem Relation Age of Onset     Alcohol/Drug Maternal Grandmother      alcohol     Hypertension Paternal Grandfather      Cancer Paternal Grandfather      HEART DISEASE Paternal Grandfather      Cancer Maternal Grandfather      lung     Alzheimer Disease Paternal Grandmother      Family History Negative Mother      Cardiovascular Father 54     MI     HEART DISEASE Father            Reviewed and updated as needed this visit by clinical staff       Reviewed and updated as needed this visit by Provider         ROS:  Constitutional, HEENT, cardiovascular, pulmonary, gi and gu systems are negative, except as otherwise noted.    OBJECTIVE:                                                    /68  Pulse 88  Temp 98.6  F (37  C) (Tympanic)   Resp 16  Wt 125 lb (56.7 kg)  LMP 08/27/2018 (Approximate)  BMI 19.01 kg/m2  Body mass index is 19.01 kg/(m^2).  GENERAL APPEARANCE: healthy, alert and no distress  HENT: ear canals and TM's normal and nose and mouth without ulcers or lesions  RESP: lungs clear to auscultation - no rales, rhonchi or wheezes  CV: regular rates and rhythm, normal S1 S2, no S3 or S4 and no murmur, click or rub  SKIN: pustule acne on forehead and chin     Diagnostic test results:  Diagnostic Test Results:  none      ASSESSMENT/PLAN:                                                    1. Smoker  Will start chantix   Discussed possible side effects in depth today   - varenicline (CHANTIX STARTING MONTH JANA) 0.5 MG X 11 & 1 MG X 42 tablet; Take 0.5 mg tab daily for 3 days, then 0.5 mg tab twice daily for 4 days, then 1 mg twice daily.  Dispense: 53 tablet; Refill: 0  - varenicline (CHANTIX) 1 MG tablet; Take 1 tablet (1 mg) by mouth 2 times daily  Dispense: 56 tablet; Refill: 3      2. Inflammatory acne  Will treat with systemic antibiotic in light of degree of acne and the pustules   - doxycycline monohydrate 100 MG capsule; Take 1 capsule (100 mg) by mouth 2 times daily  Dispense: 60 capsule; Refill: 1    3. Screening for thyroid disorder  Requested TSH check today   - TSH with free T4 reflex    4. Need for prophylactic vaccination and inoculation against influenza  - FLU VACCINE, SPLIT VIRUS, IM (QUADRIVALENT) [14773]- >3 YRS  - Vaccine Administration, Initial [52382]      Patient Instructions     1. Smoker  Start chantix   Consider nicotine replacement with gum, buy OVER THE COUNTER     - varenicline (CHANTIX STARTING MONTH JANA) 0.5 MG X 11 & 1 MG X 42 tablet; Take 0.5 mg tab daily for 3 days, then 0.5 mg tab twice daily for 4 days, then 1 mg twice daily.  Dispense: 53 tablet; Refill: 0  - varenicline (CHANTIX) 1 MG tablet; Take 1 tablet (1 mg) by mouth 2 times daily  Dispense: 56 tablet; Refill: 3    2. Inflammatory acne  Will treat  with systemic antibiotic   Use until clear   Follow up if not improved in 4-6 weeks   - doxycycline monohydrate 100 MG capsule; Take 1 capsule (100 mg) by mouth 2 times daily  Dispense: 60 capsule; Refill: 1    Flu shot today     Thyroid screening     Varenicline oral tablets  Brand Name: Chantix  What is this medicine?  VARENICLINE (addie EN i kleen) is used to help people quit smoking. It can reduce the symptoms caused by stopping smoking. It is used with a patient support program recommended by your physician.  How should I use this medicine?  Take this medicine by mouth after eating. Take with a full glass of water. Follow the directions on the prescription label. Take your doses at regular intervals. Do not take your medicine more often than directed.  There are 3 ways you can use this medicine to help you quit smoking; talk to your health care professional to decide which plan is right for you:  1) you can choose a quit date and start this medicine 1 week before the quit date, or,  2) you can start taking this medicine before you choose a quit date, and then pick a quit date between day 8 and 35 days of treatment, or,  3) if you are not sure that you are able or willing to quit smoking right away, start taking this medicine and slowly decrease the amount you smoke as directed by your health care professional with the goal of being cigarette-free by week 12 of treatment.  Stick to your plan; ask about support groups or other ways to help you remain cigarette-free. If you are motivated to quit smoking and did not succeed during a previous attempt with this medicine for reasons other than side effects, or if you returned to smoking after this treatment, speak with your health care professional about whether another course of this medicine may be right for you.  A special MedGuide will be given to you by the pharmacist with each prescription and refill. Be sure to read this information carefully each time.  Talk to  your pediatrician regarding the use of this medicine in children. This medicine is not approved for use in children.  What side effects may I notice from receiving this medicine?  Side effects that you should report to your doctor or health care professional as soon as possible:    allergic reactions like skin rash, itching or hives, swelling of the face, lips, tongue, or throat    acting aggressive, being angry or violent, or acting on dangerous impulses    breathing problems    changes in vision    chest pain or chest tightness    confusion, trouble speaking or understanding    new or worsening depression, anxiety, or panic attacks    extreme increase in activity and talking (eneida)    fast, irregular heartbeat    feeling faint or lightheaded, falls    fever    pain in legs when walking    problems with balance, talking, walking    redness, blistering, peeling or loosening of the skin, including inside the mouth    ringing in ears    seeing or hearing things that aren't there (hallucinations)    seizures    sleepwalking    sudden numbness or weakness of the face, arm or leg    thoughts about suicide or dying, or attempts to commit suicide    trouble passing urine or change in the amount of urine    unusual bleeding or bruising    unusually weak or tired  Side effects that usually do not require medical attention (report to your doctor or health care professional if they continue or are bothersome):    constipation    headache    nausea, vomiting    strange dreams    stomach gas    trouble sleeping  What may interact with this medicine?    alcohol or any product that contains alcohol    insulin    other stop smoking aids    theophylline    warfarin    What if I miss a dose?  If you miss a dose, take it as soon as you can. If it is almost time for your next dose, take only that dose. Do not take double or extra doses.  Where should I keep my medicine?  Keep out of the reach of children.  Store at room temperature  between 15 and 30 degrees C (59 and 86 degrees F). Throw away any unused medicine after the expiration date.  What should I tell my health care provider before I take this medicine?  They need to know if you have any of these conditions:    bipolar disorder, depression, schizophrenia or other mental illness    heart disease    if you often drink alcohol    kidney disease    peripheral vascular disease    seizures    stroke    suicidal thoughts, plans, or attempt; a previous suicide attempt by you or a family member    an unusual or allergic reaction to varenicline, other medicines, foods, dyes, or preservatives    pregnant or trying to get pregnant    breast-feeding  What should I watch for while using this medicine?  Visit your doctor or health care professional for regular check ups. Ask for ongoing advice and encouragement from your doctor or healthcare professional, friends, and family to help you quit. If you smoke while on this medication, quit again  Your mouth may get dry. Chewing sugarless gum or sucking hard candy, and drinking plenty of water may help. Contact your doctor if the problem does not go away or is severe.  You may get drowsy or dizzy. Do not drive, use machinery, or do anything that needs mental alertness until you know how this medicine affects you. Do not stand or sit up quickly, especially if you are an older patient. This reduces the risk of dizzy or fainting spells.  Sleepwalking can happen during treatment with this medicine, and can sometimes lead to behavior that is harmful to you, other people, or property. Stop taking this medicine and tell your doctor if you start sleepwalking or have other unusual sleep-related activity.  Decrease the amount of alcoholic beverages that you drink during treatment with this medicine until you know if this medicine affects your ability to tolerate alcohol. Some people have experienced increased drunkenness (intoxication), unusual or sometimes  aggressive behavior, or no memory of things that have happened (amnesia) during treatment with this medicine.  The use of this medicine may increase the chance of suicidal thoughts or actions. Pay special attention to how you are responding while on this medicine. Any worsening of mood, or thoughts of suicide or dying should be reported to your health care professional right away.  NOTE:This sheet is a summary. It may not cover all possible information. If you have questions about this medicine, talk to your doctor, pharmacist, or health care provider. Copyright  2018 [employer]        How to Quit Smoking  Smoking is one of the hardest habits to break. About half of all people who have ever smoked have been able to quit. Most people who still smoke want to quit. Here are some of the best ways to stop smoking.    Keep trying  Most smokers make many attempts at quitting before they are successful. It s important not to give up.  Go cold turkey  Most former smokers quit cold turkey (all at once). Trying to cut back gradually doesn't seem to work as well, perhaps because it continues the smoking habit. Also, it is possible to inhale more while smoking fewer cigarettes. This results in the same amount of nicotine in your body.  Get support  Support programs can be a big help, especially for heavy smokers. These groups offer lectures, ways to change behavior, and peer support. Here are some ways to find a support program:    Free national quitline: 800-QUIT-NOW (307-940-2803).    [facility] quit-smoking programs.    American Lung Association: (580.140.3455).    American Cancer Society (308-811-1807).  Support at home is important too. Nonsmokers can offer praise and encouragement. If the smoker in your life finds it hard to quit, encourage them to keep trying.  Over-the-counter medicines  Nicotine replacement therapy may make quitting easier. Certain aids, such as the nicotine patch, gum, and lozenges, are available without a  "prescription. It is best to use these under a doctor s care, though. The skin patch provides a steady supply of nicotine. Nicotine gum and lozenges give temporary bursts of low levels of nicotine. Both methods reduce the craving for cigarettes. Warning: If you have nausea, vomiting, dizziness, weakness, or a fast heartbeat, stop using these products and see your doctor.  Prescription medicines  After reviewing your smoking patterns and past attempts to quit, your doctor may offer a prescription medicine such as bupropion, varenicline, a nicotine inhaler, or nasal spray. Each has advantages and side effects. Your doctor can review these with you.  Health benefits of quitting  The benefits of quitting start right away and keep improving the longer you go without smoking. These benefits occur at any age.  So whether you are 17 or 70, quitting is a good decision. Some of the benefits include:    20 minutes: Blood pressure and pulse return to normal.    8 hours: Oxygen levels return to normal.    2 days: Ability to smell and taste begin to improve as damaged nerves regrow.    2 to 3 weeks: Circulation and lung function improve.    1 to 9 months: Coughing, congestion, and shortness of breath decrease; tiredness decreases.    1 year: Risk of heart attack decreases by half.    5 years: Risk of lung cancer decreases by half; risk of stroke becomes the same as a nonsmoker s.  For more on how to quit smoking, try these online resources:     Smokefree.gov    \"Clearing the Air\" booklet from the [employer]: smokefree.gov/sites/default/files/pdf/clearing-the-air-accessible.pdf  Date Last Reviewed: 3/1/2017    2516-7050 [employer]. [address] [place] [address], [place] [address]. All rights reserved. This information is not intended as a substitute for professional medical care. Always follow your healthcare professional's instructions.            Ernestine Campos NP  [facility]  "

## 2018-09-24 NOTE — PATIENT INSTRUCTIONS
1. Smoker  Start chantix   Consider nicotine replacement with gum, buy OVER THE COUNTER     - varenicline (CHANTIX STARTING MONTH PAK) 0.5 MG X 11 & 1 MG X 42 tablet; Take 0.5 mg tab daily for 3 days, then 0.5 mg tab twice daily for 4 days, then 1 mg twice daily.  Dispense: 53 tablet; Refill: 0  - varenicline (CHANTIX) 1 MG tablet; Take 1 tablet (1 mg) by mouth 2 times daily  Dispense: 56 tablet; Refill: 3    2. Inflammatory acne  Will treat with systemic antibiotic   Use until clear   Follow up if not improved in 4-6 weeks   - doxycycline monohydrate 100 MG capsule; Take 1 capsule (100 mg) by mouth 2 times daily  Dispense: 60 capsule; Refill: 1    Flu shot today     Thyroid screening     Varenicline oral tablets  Brand Name: Chantix  What is this medicine?  VARENICLINE (addie EN i kleen) is used to help people quit smoking. It can reduce the symptoms caused by stopping smoking. It is used with a patient support program recommended by your physician.  How should I use this medicine?  Take this medicine by mouth after eating. Take with a full glass of water. Follow the directions on the prescription label. Take your doses at regular intervals. Do not take your medicine more often than directed.  There are 3 ways you can use this medicine to help you quit smoking; talk to your health care professional to decide which plan is right for you:  1) you can choose a quit date and start this medicine 1 week before the quit date, or,  2) you can start taking this medicine before you choose a quit date, and then pick a quit date between day 8 and 35 days of treatment, or,  3) if you are not sure that you are able or willing to quit smoking right away, start taking this medicine and slowly decrease the amount you smoke as directed by your health care professional with the goal of being cigarette-free by week 12 of treatment.  Stick to your plan; ask about support groups or other ways to help you remain cigarette-free. If you  are motivated to quit smoking and did not succeed during a previous attempt with this medicine for reasons other than side effects, or if you returned to smoking after this treatment, speak with your health care professional about whether another course of this medicine may be right for you.  A special MedGuide will be given to you by the pharmacist with each prescription and refill. Be sure to read this information carefully each time.  Talk to your pediatrician regarding the use of this medicine in children. This medicine is not approved for use in children.  What side effects may I notice from receiving this medicine?  Side effects that you should report to your doctor or health care professional as soon as possible:    allergic reactions like skin rash, itching or hives, swelling of the face, lips, tongue, or throat    acting aggressive, being angry or violent, or acting on dangerous impulses    breathing problems    changes in vision    chest pain or chest tightness    confusion, trouble speaking or understanding    new or worsening depression, anxiety, or panic attacks    extreme increase in activity and talking (eneida)    fast, irregular heartbeat    feeling faint or lightheaded, falls    fever    pain in legs when walking    problems with balance, talking, walking    redness, blistering, peeling or loosening of the skin, including inside the mouth    ringing in ears    seeing or hearing things that aren't there (hallucinations)    seizures    sleepwalking    sudden numbness or weakness of the face, arm or leg    thoughts about suicide or dying, or attempts to commit suicide    trouble passing urine or change in the amount of urine    unusual bleeding or bruising    unusually weak or tired  Side effects that usually do not require medical attention (report to your doctor or health care professional if they continue or are bothersome):    constipation    headache    nausea, vomiting    strange  dreams    stomach gas    trouble sleeping  What may interact with this medicine?    alcohol or any product that contains alcohol    insulin    other stop smoking aids    theophylline    warfarin    What if I miss a dose?  If you miss a dose, take it as soon as you can. If it is almost time for your next dose, take only that dose. Do not take double or extra doses.  Where should I keep my medicine?  Keep out of the reach of children.  Store at room temperature between 15 and 30 degrees C (59 and 86 degrees F). Throw away any unused medicine after the expiration date.  What should I tell my health care provider before I take this medicine?  They need to know if you have any of these conditions:    bipolar disorder, depression, schizophrenia or other mental illness    heart disease    if you often drink alcohol    kidney disease    peripheral vascular disease    seizures    stroke    suicidal thoughts, plans, or attempt; a previous suicide attempt by you or a family member    an unusual or allergic reaction to varenicline, other medicines, foods, dyes, or preservatives    pregnant or trying to get pregnant    breast-feeding  What should I watch for while using this medicine?  Visit your doctor or health care professional for regular check ups. Ask for ongoing advice and encouragement from your doctor or healthcare professional, friends, and family to help you quit. If you smoke while on this medication, quit again  Your mouth may get dry. Chewing sugarless gum or sucking hard candy, and drinking plenty of water may help. Contact your doctor if the problem does not go away or is severe.  You may get drowsy or dizzy. Do not drive, use machinery, or do anything that needs mental alertness until you know how this medicine affects you. Do not stand or sit up quickly, especially if you are an older patient. This reduces the risk of dizzy or fainting spells.  Sleepwalking can happen during treatment with this medicine, and  can sometimes lead to behavior that is harmful to you, other people, or property. Stop taking this medicine and tell your doctor if you start sleepwalking or have other unusual sleep-related activity.  Decrease the amount of alcoholic beverages that you drink during treatment with this medicine until you know if this medicine affects your ability to tolerate alcohol. Some people have experienced increased drunkenness (intoxication), unusual or sometimes aggressive behavior, or no memory of things that have happened (amnesia) during treatment with this medicine.  The use of this medicine may increase the chance of suicidal thoughts or actions. Pay special attention to how you are responding while on this medicine. Any worsening of mood, or thoughts of suicide or dying should be reported to your health care professional right away.  NOTE:This sheet is a summary. It may not cover all possible information. If you have questions about this medicine, talk to your doctor, pharmacist, or health care provider. Copyright  2018 [employer]        How to Quit Smoking  Smoking is one of the hardest habits to break. About half of all people who have ever smoked have been able to quit. Most people who still smoke want to quit. Here are some of the best ways to stop smoking.    Keep trying  Most smokers make many attempts at quitting before they are successful. It s important not to give up.  Go cold turkey  Most former smokers quit cold turkey (all at once). Trying to cut back gradually doesn't seem to work as well, perhaps because it continues the smoking habit. Also, it is possible to inhale more while smoking fewer cigarettes. This results in the same amount of nicotine in your body.  Get support  Support programs can be a big help, especially for heavy smokers. These groups offer lectures, ways to change behavior, and peer support. Here are some ways to find a support program:    Free national quitline: 800-QUIT-NOW  (867.866.6863).    [facility] quit-smoking programs.    American Lung Association: (707.917.1630).    American Cancer Society (365-475-6908).  Support at home is important too. Nonsmokers can offer praise and encouragement. If the smoker in your life finds it hard to quit, encourage them to keep trying.  Over-the-counter medicines  Nicotine replacement therapy may make quitting easier. Certain aids, such as the nicotine patch, gum, and lozenges, are available without a prescription. It is best to use these under a doctor s care, though. The skin patch provides a steady supply of nicotine. Nicotine gum and lozenges give temporary bursts of low levels of nicotine. Both methods reduce the craving for cigarettes. Warning: If you have nausea, vomiting, dizziness, weakness, or a fast heartbeat, stop using these products and see your doctor.  Prescription medicines  After reviewing your smoking patterns and past attempts to quit, your doctor may offer a prescription medicine such as bupropion, varenicline, a nicotine inhaler, or nasal spray. Each has advantages and side effects. Your doctor can review these with you.  Health benefits of quitting  The benefits of quitting start right away and keep improving the longer you go without smoking. These benefits occur at any age.  So whether you are 17 or 70, quitting is a good decision. Some of the benefits include:    20 minutes: Blood pressure and pulse return to normal.    8 hours: Oxygen levels return to normal.    2 days: Ability to smell and taste begin to improve as damaged nerves regrow.    2 to 3 weeks: Circulation and lung function improve.    1 to 9 months: Coughing, congestion, and shortness of breath decrease; tiredness decreases.    1 year: Risk of heart attack decreases by half.    5 years: Risk of lung cancer decreases by half; risk of stroke becomes the same as a nonsmoker s.  For more on how to quit smoking, try these online  "resources:     Smokefree.gov    \"Clearing the Air\" booklet from the National Cancer [place]: smokefree.gov/sites/default/files/pdf/clearing-the-air-accessible.pdf  Date Last Reviewed: 3/1/2017    0013-0248 [employer]. [address] [place] [address]. All rights reserved. This information is not intended as a substitute for professional medical care. Always follow your healthcare professional's instructions.        "

## 2018-10-04 ENCOUNTER — OFFICE VISIT (OUTPATIENT)
Dept: FAMILY MEDICINE | Facility: CLINIC | Age: 28
End: 2018-10-04
Payer: COMMERCIAL

## 2018-10-04 VITALS
DIASTOLIC BLOOD PRESSURE: 72 MMHG | HEIGHT: 68 IN | WEIGHT: 127 LBS | HEART RATE: 88 BPM | SYSTOLIC BLOOD PRESSURE: 122 MMHG | BODY MASS INDEX: 19.25 KG/M2 | TEMPERATURE: 98.3 F | RESPIRATION RATE: 18 BRPM

## 2018-10-04 DIAGNOSIS — R53.83 FATIGUE, UNSPECIFIED TYPE: Primary | ICD-10-CM

## 2018-10-04 DIAGNOSIS — R11.0 NAUSEA: ICD-10-CM

## 2018-10-04 LAB
ALBUMIN SERPL-MCNC: 3.9 G/DL (ref 3.4–5)
ALP SERPL-CCNC: 79 U/L (ref 40–150)
ALT SERPL W P-5'-P-CCNC: 20 U/L (ref 0–50)
ANION GAP SERPL CALCULATED.3IONS-SCNC: 6 MMOL/L (ref 3–14)
AST SERPL W P-5'-P-CCNC: 17 U/L (ref 0–45)
BILIRUB SERPL-MCNC: 0.5 MG/DL (ref 0.2–1.3)
BUN SERPL-MCNC: 12 MG/DL (ref 7–30)
CALCIUM SERPL-MCNC: 8.6 MG/DL (ref 8.5–10.1)
CHLORIDE SERPL-SCNC: 102 MMOL/L (ref 94–109)
CO2 SERPL-SCNC: 29 MMOL/L (ref 20–32)
CREAT SERPL-MCNC: 0.65 MG/DL (ref 0.52–1.04)
ERYTHROCYTE [DISTWIDTH] IN BLOOD BY AUTOMATED COUNT: 12.4 % (ref 10–15)
GFR SERPL CREATININE-BSD FRML MDRD: >90 ML/MIN/1.7M2
GLUCOSE SERPL-MCNC: 62 MG/DL (ref 70–99)
HCT VFR BLD AUTO: 40.9 % (ref 35–47)
HGB BLD-MCNC: 13.5 G/DL (ref 11.7–15.7)
MCH RBC QN AUTO: 31.8 PG (ref 26.5–33)
MCHC RBC AUTO-ENTMCNC: 33 G/DL (ref 31.5–36.5)
MCV RBC AUTO: 97 FL (ref 78–100)
PLATELET # BLD AUTO: 302 10E9/L (ref 150–450)
POTASSIUM SERPL-SCNC: 3.6 MMOL/L (ref 3.4–5.3)
PROT SERPL-MCNC: 6.9 G/DL (ref 6.8–8.8)
RBC # BLD AUTO: 4.24 10E12/L (ref 3.8–5.2)
SODIUM SERPL-SCNC: 137 MMOL/L (ref 133–144)
WBC # BLD AUTO: 10.8 10E9/L (ref 4–11)

## 2018-10-04 PROCEDURE — 85027 COMPLETE CBC AUTOMATED: CPT | Performed by: FAMILY MEDICINE

## 2018-10-04 PROCEDURE — 80053 COMPREHEN METABOLIC PANEL: CPT | Performed by: FAMILY MEDICINE

## 2018-10-04 PROCEDURE — 36415 COLL VENOUS BLD VENIPUNCTURE: CPT | Performed by: FAMILY MEDICINE

## 2018-10-04 PROCEDURE — 86618 LYME DISEASE ANTIBODY: CPT | Performed by: FAMILY MEDICINE

## 2018-10-04 PROCEDURE — 99213 OFFICE O/P EST LOW 20 MIN: CPT | Performed by: FAMILY MEDICINE

## 2018-10-04 RX ORDER — ONDANSETRON 4 MG/1
4 TABLET, FILM COATED ORAL EVERY 8 HOURS PRN
Qty: 18 TABLET | Refills: 0 | Status: SHIPPED | OUTPATIENT
Start: 2018-10-04 | End: 2018-11-13

## 2018-10-04 NOTE — NURSING NOTE
"Chief Complaint   Patient presents with     Nausea       Initial /72 (Cuff Size: Adult Regular)  Pulse 88  Temp 98.3  F (36.8  C) (Tympanic)  Resp 18  Ht 5' 8\" (1.727 m)  Wt 127 lb (57.6 kg)  Breastfeeding? No  BMI 19.31 kg/m2 Estimated body mass index is 19.31 kg/(m^2) as calculated from the following:    Height as of this encounter: 5' 8\" (1.727 m).    Weight as of this encounter: 127 lb (57.6 kg).    Patient presents to the clinic using No DME    Health Maintenance that is potentially due pending provider review:  NONE    n/a    Is there anyone who you would like to be able to receive your results? Not Applicable  If yes have patient fill out JESSICA    "

## 2018-10-04 NOTE — PROGRESS NOTES
SUBJECTIVE:   Jackie Quispe is a 28 year old female who presents to clinic today for the following health issues:      Nausea       Duration: about a week     Description (location/character/radiation): been feeling nauseated, lightheaded , low grade fever    Intensity:  moderate    Accompanying signs and symptoms: started chantix on 9/24- not sure if these are side effects from that as she was experiencing some before initiating the medication. Also had her flu shot that same day.     History (similar episodes/previous evaluation): None    Precipitating or alleviating factors: No known chance of pregnancy- has the Mirena     Therapies tried and outcome: Ibuprofen          Problem list and histories reviewed & adjusted, as indicated.  Additional history: as documented    Patient Active Problem List   Diagnosis     Smoker     Acne     Dysfunctional uterine bleeding     FIDELIA (generalized anxiety disorder)     CARDIOVASCULAR SCREENING; LDL GOAL LESS THAN 160     Mirena IUD- remove by 3/2022     Rib pain     Past Surgical History:   Procedure Laterality Date     MOUTH SURGERY      wisdom teeth       Social History   Substance Use Topics     Smoking status: Current Every Day Smoker     Packs/day: 0.25     Types: Cigarettes     Smokeless tobacco: Never Used      Comment: smoking  10 cig/day     Alcohol use Yes      Comment: Occas- quit with pregnacny     Family History   Problem Relation Age of Onset     Alcohol/Drug Maternal Grandmother      alcohol     Hypertension Paternal Grandfather      Cancer Paternal Grandfather      HEART DISEASE Paternal Grandfather      Cancer Maternal Grandfather      lung     Alzheimer Disease Paternal Grandmother      Family History Negative Mother      Cardiovascular Father 54     MI     HEART DISEASE Father          Current Outpatient Prescriptions   Medication Sig Dispense Refill     doxycycline monohydrate 100 MG capsule Take 1 capsule (100 mg) by mouth 2 times daily 60 capsule 1      "levonorgestrel (MIRENA) 20 MCG/24HR IUD 1 each (20 mcg) by Intrauterine route once 7/8/2016 1 each 0     varenicline (CHANTIX STARTING MONTH JANA) 0.5 MG X 11 & 1 MG X 42 tablet Take 0.5 mg tab daily for 3 days, then 0.5 mg tab twice daily for 4 days, then 1 mg twice daily. 53 tablet 0     varenicline (CHANTIX) 1 MG tablet Take 1 tablet (1 mg) by mouth 2 times daily (Patient not taking: Reported on 10/4/2018) 56 tablet 3     No Known Allergies  Recent Labs   Lab Test  09/24/18   1610  09/13/16   1750   ALT   --   17   CR   --   0.61   GFRESTIMATED   --   >90  Non  GFR Calc     GFRESTBLACK   --   >90   GFR Calc     POTASSIUM   --   3.9   TSH  0.21*  1.22      BP Readings from Last 3 Encounters:   10/04/18 122/72   09/24/18 132/68   06/20/18 124/78    Wt Readings from Last 3 Encounters:   10/04/18 127 lb (57.6 kg)   09/24/18 125 lb (56.7 kg)   06/20/18 119 lb (54 kg)                  Labs reviewed in EPIC    Reviewed and updated as needed this visit by clinical staff       Reviewed and updated as needed this visit by Provider         ROS:  Constitutional, HEENT, cardiovascular, pulmonary, gi and gu systems are negative, except as otherwise noted.    OBJECTIVE:     /72 (Cuff Size: Adult Regular)  Pulse 88  Temp 98.3  F (36.8  C) (Tympanic)  Resp 18  Ht 5' 8\" (1.727 m)  Wt 127 lb (57.6 kg)  Breastfeeding? No  BMI 19.31 kg/m2  Body mass index is 19.31 kg/(m^2).  GENERAL: healthy, alert and no distress  EYES: Eyes grossly normal to inspection, PERRL and conjunctivae and sclerae normal  HENT: ear canals and TM's normal, nose and mouth without ulcers or lesions  NECK: no adenopathy, no asymmetry, masses, or scars and thyroid normal to palpation  RESP: lungs clear to auscultation - no rales, rhonchi or wheezes  CV: regular rate and rhythm, normal S1 S2, no S3 or S4, no murmur, click or rub, no peripheral edema and peripheral pulses strong  ABDOMEN: soft, nontender, no " hepatosplenomegaly, no masses and bowel sounds normal  MS: no gross musculoskeletal defects noted, no edema  SKIN: no suspicious lesions or rashes  NEURO: Normal strength and tone, mentation intact and speech normal      ASSESSMENT/PLAN:         ICD-10-CM    1. Fatigue, unspecified type R53.83 CBC with platelets     Comprehensive metabolic panel (BMP + Alb, Alk Phos, ALT, AST, Total. Bili, TP)     Lyme Disease Macy with reflex to WB Serum   2. Nausea R11.0 ondansetron (ZOFRAN) 4 MG tablet     Suspect symptoms secondary to Chantix side effects.  Patient would like to continue Chantix for now as it has helped to cut back on her cigarette smoking.  Smoking cessation counseling provided.  CBC, CMP and Lyme serology ordered for further evaluation.  Follow-up in 2 weeks or earlier if needed.  All questions answered.      Patient Instructions     How to Quit Smoking  Smoking is one of the hardest habits to break. About half of all people who have ever smoked have been able to quit. Most people who still smoke want to quit. Here are some of the best ways to stop smoking.    Keep trying  Most smokers make many attempts at quitting before they are successful. It s important not to give up.  Go cold turkey  Most former smokers quit cold turkey (all at once). Trying to cut back gradually doesn't seem to work as well, perhaps because it continues the smoking habit. Also, it is possible to inhale more while smoking fewer cigarettes. This results in the same amount of nicotine in your body.  Get support  Support programs can be a big help, especially for heavy smokers. These groups offer lectures, ways to change behavior, and peer support. Here are some ways to find a support program:    Free national quitline: 800-QUIT-NOW (944-406-0947).    [facility] quit-smoking programs.    American Lung Association: (384.867.1947).    American Cancer Society (892-767-4974).  Support at home is important too. Nonsmokers can offer praise and  "encouragement. If the smoker in your life finds it hard to quit, encourage them to keep trying.  Over-the-counter medicines  Nicotine replacement therapy may make quitting easier. Certain aids, such as the nicotine patch, gum, and lozenges, are available without a prescription. It is best to use these under a doctor s care, though. The skin patch provides a steady supply of nicotine. Nicotine gum and lozenges give temporary bursts of low levels of nicotine. Both methods reduce the craving for cigarettes. Warning: If you have nausea, vomiting, dizziness, weakness, or a fast heartbeat, stop using these products and see your doctor.  Prescription medicines  After reviewing your smoking patterns and past attempts to quit, your doctor may offer a prescription medicine such as bupropion, varenicline, a nicotine inhaler, or nasal spray. Each has advantages and side effects. Your doctor can review these with you.  Health benefits of quitting  The benefits of quitting start right away and keep improving the longer you go without smoking. These benefits occur at any age.  So whether you are 17 or 70, quitting is a good decision. Some of the benefits include:    20 minutes: Blood pressure and pulse return to normal.    8 hours: Oxygen levels return to normal.    2 days: Ability to smell and taste begin to improve as damaged nerves regrow.    2 to 3 weeks: Circulation and lung function improve.    1 to 9 months: Coughing, congestion, and shortness of breath decrease; tiredness decreases.    1 year: Risk of heart attack decreases by half.    5 years: Risk of lung cancer decreases by half; risk of stroke becomes the same as a nonsmoker s.  For more on how to quit smoking, try these online resources:     Smokefree.gov    \"Clearing the Air\" booklet from the National Cancer [place]: smokefree.gov/sites/default/files/pdf/clearing-the-air-accessible.pdf  Date Last Reviewed: 3/1/2017    0111-7085 [employer] [address] [place] [address]. All rights reserved. This information is not intended as a substitute for professional medical care. Always follow your healthcare professional's instructions.            Tru Savage MD  [facility]

## 2018-10-04 NOTE — MR AVS SNAPSHOT
After Visit Summary   10/4/2018    Jackie Quispe    MRN: [id number]           Patient Information     Date Of Birth          1990        Visit Information        Provider Department      10/4/2018 1:40 PM Tru Savage MD [facility]        Today's Diagnoses     Fatigue, unspecified type    -  1      Care Instructions      How to Quit Smoking  Smoking is one of the hardest habits to break. About half of all people who have ever smoked have been able to quit. Most people who still smoke want to quit. Here are some of the best ways to stop smoking.    Keep trying  Most smokers make many attempts at quitting before they are successful. It s important not to give up.  Go cold turkey  Most former smokers quit cold turkey (all at once). Trying to cut back gradually doesn't seem to work as well, perhaps because it continues the smoking habit. Also, it is possible to inhale more while smoking fewer cigarettes. This results in the same amount of nicotine in your body.  Get support  Support programs can be a big help, especially for heavy smokers. These groups offer lectures, ways to change behavior, and peer support. Here are some ways to find a support program:    Free national quitline: 800-QUIT-NOW (497-462-0675).    [facility] quit-smoking programs.    American Lung Association: (710.719.4969).    American Cancer Society (499-526-8410).  Support at home is important too. Nonsmokers can offer praise and encouragement. If the smoker in your life finds it hard to quit, encourage them to keep trying.  Over-the-counter medicines  Nicotine replacement therapy may make quitting easier. Certain aids, such as the nicotine patch, gum, and lozenges, are available without a prescription. It is best to use these under a doctor s care, though. The skin patch provides a steady supply of nicotine. Nicotine gum and lozenges give temporary bursts of low levels of nicotine. Both methods reduce the  "craving for cigarettes. Warning: If you have nausea, vomiting, dizziness, weakness, or a fast heartbeat, stop using these products and see your doctor.  Prescription medicines  After reviewing your smoking patterns and past attempts to quit, your doctor may offer a prescription medicine such as bupropion, varenicline, a nicotine inhaler, or nasal spray. Each has advantages and side effects. Your doctor can review these with you.  Health benefits of quitting  The benefits of quitting start right away and keep improving the longer you go without smoking. These benefits occur at any age.  So whether you are 17 or 70, quitting is a good decision. Some of the benefits include:    20 minutes: Blood pressure and pulse return to normal.    8 hours: Oxygen levels return to normal.    2 days: Ability to smell and taste begin to improve as damaged nerves regrow.    2 to 3 weeks: Circulation and lung function improve.    1 to 9 months: Coughing, congestion, and shortness of breath decrease; tiredness decreases.    1 year: Risk of heart attack decreases by half.    5 years: Risk of lung cancer decreases by half; risk of stroke becomes the same as a nonsmoker s.  For more on how to quit smoking, try these online resources:     Smokefree.gov    \"Clearing the Air\" booklet from the National Cancer [place]: smokefree.gov/sites/default/files/pdf/clearing-the-air-accessible.pdf  Date Last Reviewed: 3/1/2017    0165-9322 [employer]. [address] [place], [place] [address]. All rights reserved. This information is not intended as a substitute for professional medical care. Always follow your healthcare professional's instructions.                Follow-ups after your visit        Follow-up notes from your care team     Return in about 1 week (around 10/11/2018).      Who to contact     If you have questions or need follow up information about today's clinic visit or your schedule please contact [facility] directly at [phone].  Normal or non-critical lab and imaging results will be communicated to you by Extended Systemshart, letter or phone within 4 business days after the clinic has received the results. If you do not hear from us within 7 days, please contact the clinic through Cloud[employer] Globalt or phone. If you have a critical or abnormal lab result, we will notify you by phone as soon as possible.  Submit refill requests through Miner or call your pharmacy and they will forward the refill request to us. Please allow 3 business days for your refill to be completed.          Additional Information About Your Visit        Extended SystemsharParcelPoint Information     Miner gives you secure access to your electronic health record. If you see a primary care provider, you can also send messages to your care team and make appointments. If you have questions, please call your primary care clinic.  If you do not have a primary care provider, please call [phone] and they will assist you.        Care EveryWhere ID     This is your Care EveryWhere ID. This could be used by other organizations to access your [place] medical records  [id number]        Your Vitals Were     Pulse Temperature Respirations Height Breastfeeding? BMI (Body Mass Index)    88 98.3  F (36.8  C) (Tympanic) 18 5' 8\" (1.727 m) No 19.31 kg/m2       Blood Pressure from Last 3 Encounters:   10/04/18 122/72   09/24/18 132/68   06/20/18 124/78    Weight from Last 3 Encounters:   10/04/18 127 lb (57.6 kg)   09/24/18 125 lb (56.7 kg)   06/20/18 119 lb (54 kg)              We Performed the Following     CBC with platelets     Comprehensive metabolic panel (BMP + Alb, Alk Phos, ALT, AST, Total. Bili, TP)     Lyme Disease Macy with reflex to WB Serum          Today's Medication Changes          These changes are accurate as of 10/4/18  2:03 PM.  If you have any questions, ask your nurse or doctor.               Start taking these medicines.        Dose/Directions    ondansetron 4 MG " tablet   Commonly known as:  ZOFRAN   Used for:  Fatigue, unspecified type   Started by:  Tru Savage MD        Dose:  4 mg   Take 1 tablet (4 mg) by mouth every 8 hours as needed for nausea   Quantity:  18 tablet   Refills:  0            Where to get your medicines      These medications were sent to [id number] - [place], [place] - [address]   [address] , [facility] [address]     Phone:  [phone]     ondansetron 4 MG tablet                Primary Care Provider Office Phone # Fax #    Tammy Teagan Beth [phone] [address] [facility] [address]        Equal Access to Services     HPIL [facility] Hospital[facility]IGEL : Hadii daniel shabazz Somontrell, waaxda luqadaha, qaybta kaalmada adelindsey, sunni charlton . So [place] [phone].    ATENCIÓN: Si habla español, tiene a dang disposición servicios gratuitos de asistencia lingüística. Llame al [phone].    We comply with applicable federal civil rights laws and [place] laws. We do not discriminate on the basis of race, color, national origin, age, disability, sex, sexual orientation, or gender identity.            Thank you!     Thank you for choosing [facility]  for your care. Our goal is always to provide you with excellent care. Hearing back from our patients is one way we can continue to improve our services. Please take a few minutes to complete the written survey that you may receive in the mail after your visit with us. Thank you!             Your Updated Medication List - Protect others around you: Learn how to safely use, store and throw away your medicines at www.disposemymeds.org.          This list is accurate as of 10/4/18  2:03 PM.  Always use your most recent med list.                   Brand Name Dispense Instructions for use Diagnosis    doxycycline monohydrate 100 MG capsule     60 capsule    Take 1 capsule (100 mg) by mouth 2 times daily    Inflammatory acne        levonorgestrel 20 MCG/24HR IUD    MIRENA    1 each    1 each (20 mcg) by Intrauterine route once 7/8/2016        ondansetron 4 MG tablet    ZOFRAN    18 tablet    Take 1 tablet (4 mg) by mouth every 8 hours as needed for nausea    Fatigue, unspecified type       * varenicline 0.5 MG X 11 & 1 MG X 42 tablet    CHANTIX STARTING MONTH JANA    53 tablet    Take 0.5 mg tab daily for 3 days, then 0.5 mg tab twice daily for 4 days, then 1 mg twice daily.    Smoker       * varenicline 1 MG tablet    CHANTIX    56 tablet    Take 1 tablet (1 mg) by mouth 2 times daily    Smoker       * Notice:  This list has 2 medication(s) that are the same as other medications prescribed for you. Read the directions carefully, and ask your doctor or other care provider to review them with you.

## 2018-10-04 NOTE — PATIENT INSTRUCTIONS

## 2018-10-05 DIAGNOSIS — E16.2 HYPOGLYCEMIA: Primary | ICD-10-CM

## 2018-10-05 LAB — B BURGDOR IGG+IGM SER QL: 0.04 (ref 0–0.89)

## 2018-11-13 ENCOUNTER — RADIANT APPOINTMENT (OUTPATIENT)
Dept: GENERAL RADIOLOGY | Facility: CLINIC | Age: 28
End: 2018-11-13
Attending: NURSE PRACTITIONER
Payer: COMMERCIAL

## 2018-11-13 ENCOUNTER — OFFICE VISIT (OUTPATIENT)
Dept: FAMILY MEDICINE | Facility: CLINIC | Age: 28
End: 2018-11-13
Payer: COMMERCIAL

## 2018-11-13 VITALS
TEMPERATURE: 99.6 F | HEIGHT: 67 IN | WEIGHT: 122.4 LBS | HEART RATE: 97 BPM | RESPIRATION RATE: 18 BRPM | DIASTOLIC BLOOD PRESSURE: 60 MMHG | SYSTOLIC BLOOD PRESSURE: 106 MMHG | BODY MASS INDEX: 19.21 KG/M2 | OXYGEN SATURATION: 99 %
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
## 2018-11-13 DIAGNOSIS — S69.91XA INJURY OF RIGHT HAND, INITIAL ENCOUNTER: ICD-10-CM

## 2018-11-13 DIAGNOSIS — S63.91XA SPRAIN OF HAND, RIGHT, INITIAL ENCOUNTER: Primary | ICD-10-CM

## 2018-11-13 PROCEDURE — 73130 X-RAY EXAM OF HAND: CPT | Mod: RT

## 2018-11-13 PROCEDURE — 99213 OFFICE O/P EST LOW 20 MIN: CPT | Performed by: NURSE PRACTITIONER

## 2018-11-13 ASSESSMENT — PAIN SCALES - GENERAL: PAINLEVEL: MODERATE PAIN (5)

## 2018-11-13 NOTE — LETTER
[facility]  [address] [facility] [address]  [phone]          November 13, 2018    Jackie Jose Enrique                                                                                                                     [address] [place] CARABALLO [address] [place] [id number]        To Whom it May Concern,    Jackie is under my professional care and was seen in clinic today 11/13/2018 for injury. Due to injury she will not be able to complete her job duties and should be off work today 11/13/2018 and tomorrow 11/14/2018.        Sincerely,       CADY Hahn CNP

## 2018-11-13 NOTE — PATIENT INSTRUCTIONS
Not seeing anything specific for fracture on xray - there is one area that is questionable that I would wait for the final radilogist read - I will contact you with results     Otherwise this is likely a hand sprain     Wear brace when active - otherwise can take off and elevate and ice when resting. Ice and elevate as much as possible    Okay for 600-800 mg of ibuprofen 2-3 times a day just until this improves     No work today and probable tomorrow if swelling still persists     A sprain may take several weeks to heal, return to clinic if not improving     Hand Sprain  A sprain is a stretching or tearing of the ligaments that hold a joint together. There are no broken bones. Sprains take 3 to 6 weeks, or longer to heal. A sprained hand may be treated with a splint or elastic wrap for support.  Home care    Keep your arm elevated to reduce pain and swelling. This is most important during the first 48 hours.    Apply an ice pack over the injured area for 15 to 20 minutes every 3 to 6 hours. You should do this for the first 24 to 48 hours. You can make an ice pack by filling a plastic bag that seals at the top with ice cubes and then wrapping it with a thin towel. Continue the use of ice packs for relief of pain and swelling as needed. As the ice melts, be careful to avoid getting any wrap or splint wet. After 48 hours, apply heat (warm shower or warm bath) for 15 to 20 minutes several times a day, or alternate ice and heat.    You may use over-the-counter pain medicine to control pain, unless another pain medicine was prescribed. If you have chronic liver or kidney disease or ever had a stomach ulcer or gastrointestinal bleeding, talk with your healthcare provider before using these medicines.    If you were given a splint or elastic wrap, wear it until your pain improves.  Follow-up care  Follow up with your healthcare provider, or as advised. Sometimes fractures don t show up on the first X-ray. Bruises and  sprains can sometimes hurt as much as a fracture. These injuries can take time to heal completely. If your symptoms don t improve or they get worse, talk with your healthcare provider. You may need a repeat X-ray or other tests.  When to seek medical advice  Call your healthcare provider right away if any of these occur:    Pain or swelling increases    Fingers or hand becomes cold, blue, numb, or tingly   Date Last Reviewed: 5/1/2018 2000-2018 [employer]. [address] [place] [address]. All rights reserved. This information is not intended as a substitute for professional medical care. Always follow your healthcare professional's instructions.

## 2018-11-13 NOTE — NURSING NOTE
"Chief Complaint   Patient presents with     Hand Injury     Fall       Initial Breastfeeding? No Estimated body mass index is 19.31 kg/(m^2) as calculated from the following:    Height as of 10/4/18: 5' 8\" (1.727 m).    Weight as of 10/4/18: 127 lb (57.6 kg).    Patient presents to the clinic using No DME    Health Maintenance that is potentially due pending provider review:  NONE    n/a    Is there anyone who you would like to be able to receive your results? not asked  If yes have patient fill out JESSICA    "

## 2018-11-13 NOTE — MR AVS SNAPSHOT
After Visit Summary   11/13/2018    Jackie Quispe    MRN: [id number]           Patient Information     Date Of Birth          1990        Visit Information        Provider Department      11/13/2018 11:00 AM Trish rCuz APRN [facility]        Today's Diagnoses     Fall due to slipping on ice or snow, initial encounter    -  1    Injury of right hand, initial encounter        Sprain of hand, right, initial encounter          Care Instructions    Not seeing anything specific for fracture on xray - there is one area that is questionable that I would wait for the final radilogist read - I will contact you with results     Otherwise this is likely a hand sprain     Wear brace when active - otherwise can take off and elevate and ice when resting. Ice and elevate as much as possible    Okay for 600-800 mg of ibuprofen 2-3 times a day just until this improves     No work today and probable tomorrow if swelling still persists     A sprain may take several weeks to heal, return to clinic if not improving     Hand Sprain  A sprain is a stretching or tearing of the ligaments that hold a joint together. There are no broken bones. Sprains take 3 to 6 weeks, or longer to heal. A sprained hand may be treated with a splint or elastic wrap for support.  Home care    Keep your arm elevated to reduce pain and swelling. This is most important during the first 48 hours.    Apply an ice pack over the injured area for 15 to 20 minutes every 3 to 6 hours. You should do this for the first 24 to 48 hours. You can make an ice pack by filling a plastic bag that seals at the top with ice cubes and then wrapping it with a thin towel. Continue the use of ice packs for relief of pain and swelling as needed. As the ice melts, be careful to avoid getting any wrap or splint wet. After 48 hours, apply heat (warm shower or warm bath) for 15 to 20 minutes several times a day, or alternate ice and  heat.    You may use over-the-counter pain medicine to control pain, unless another pain medicine was prescribed. If you have chronic liver or kidney disease or ever had a stomach ulcer or gastrointestinal bleeding, talk with your healthcare provider before using these medicines.    If you were given a splint or elastic wrap, wear it until your pain improves.  Follow-up care  Follow up with your healthcare provider, or as advised. Sometimes fractures don t show up on the first X-ray. Bruises and sprains can sometimes hurt as much as a fracture. These injuries can take time to heal completely. If your symptoms don t improve or they get worse, talk with your healthcare provider. You may need a repeat X-ray or other tests.  When to seek medical advice  Call your healthcare provider right away if any of these occur:    Pain or swelling increases    Fingers or hand becomes cold, blue, numb, or tingly   Date Last Reviewed: 5/1/2018 2000-2018 [employer]. [address] [place] [address]. All rights reserved. This information is not intended as a substitute for professional medical care. Always follow your healthcare professional's instructions.                Follow-ups after your visit        Follow-up notes from your care team     Return in about 1 month (around 12/13/2018), or if symptoms worsen or fail to improve.      Who to contact     If you have questions or need follow up information about today's clinic visit or your schedule please contact [facility] directly at [phone].  Normal or non-critical lab and imaging results will be communicated to you by UserApphart, letter or phone within 4 business days after the clinic has received the results. If you do not hear from us within 7 days, please contact the clinic through RotoHogt or phone. If you have a critical or abnormal lab result, we will notify you by phone as soon as possible.  Submit refill requests through Stagee  "or call your pharmacy and they will forward the refill request to us. Please allow 3 business days for your refill to be completed.          Additional Information About Your Visit        Veodiahart Information     Retrace gives you secure access to your electronic health record. If you see a primary care provider, you can also send messages to your care team and make appointments. If you have questions, please call your primary care clinic.  If you do not have a primary care provider, please call [phone] and they will assist you.        Care EveryWhere ID     This is your Care EveryWhere ID. This could be used by other organizations to access your [place] medical records  [id number]        Your Vitals Were     Pulse Temperature Respirations Height Pulse Oximetry Breastfeeding?    97 99.6  F (37.6  C) (Tympanic) 18 5' 7\" (1.702 m) 99% No    BMI (Body Mass Index)                   19.17 kg/m2            Blood Pressure from Last 3 Encounters:   11/13/18 106/60   10/04/18 122/72   09/24/18 132/68    Weight from Last 3 Encounters:   11/13/18 122 lb 6.4 oz (55.5 kg)   10/04/18 127 lb (57.6 kg)   09/24/18 125 lb (56.7 kg)                 Today's Medication Changes          These changes are accurate as of 11/13/18 11:48 AM.  If you have any questions, ask your nurse or doctor.               Start taking these medicines.        Dose/Directions    order for DME   Used for:  Sprain of hand, right, initial encounter   Started by:  Trish Cruz APRN CNP        Equipment being ordered: right spica splint   Quantity:  1 Device   Refills:  0         These medicines have changed or have updated prescriptions.        Dose/Directions    varenicline 1 MG tablet   Commonly known as:  CHANTIX   This may have changed:  Another medication with the same name was removed. Continue taking this medication, and follow the directions you see here.   Used for:  Smoker   Changed by:  Trish Cruz APRN CNP        Dose:  1 mg "   Take 1 tablet (1 mg) by mouth 2 times daily   Quantity:  56 tablet   Refills:  3         Stop taking these medicines if you haven't already. Please contact your care team if you have questions.     ondansetron 4 MG tablet   Commonly known as:  ZOFRAN   Stopped by:  Trish Cruz APRN CNP                Where to get your medicines      Some of these will need a paper prescription and others can be bought over the counter.  Ask your nurse if you have questions.     Bring a paper prescription for each of these medications     order for DME                Primary Care Provider Office Phone # Fax #    Tammy Beth [phone]       [address]  [facility] [address]        Equal Access to Services     DILMA QUINONES : Nikole Rodríguez, waenoch jorge, qabrodie kaalmada eliel, sunni charlton . So [facility] [phone].    ATENCIÓN: Si habla español, tiene a dang disposición servicios gratuitos de asistencia lingüística. Llame al [phone].    We comply with applicable federal civil rights laws and [place] laws. We do not discriminate on the basis of race, color, national origin, age, disability, sex, sexual orientation, or gender identity.            Thank you!     Thank you for choosing [facility]  for your care. Our goal is always to provide you with excellent care. Hearing back from our patients is one way we can continue to improve our services. Please take a few minutes to complete the written survey that you may receive in the mail after your visit with us. Thank you!             Your Updated Medication List - Protect others around you: Learn how to safely use, store and throw away your medicines at www.disposemymeds.org.          This list is accurate as of 11/13/18 11:48 AM.  Always use your most recent med list.                   Brand Name Dispense Instructions for use Diagnosis    doxycycline monohydrate 100 MG capsule     60  capsule    Take 1 capsule (100 mg) by mouth 2 times daily    Inflammatory acne       levonorgestrel 20 MCG/24HR IUD    MIRENA    1 each    1 each (20 mcg) by Intrauterine route once 7/8/2016        order for DME     1 Device    Equipment being ordered: right spica splint    Sprain of hand, right, initial encounter       varenicline 1 MG tablet    CHANTIX    56 tablet    Take 1 tablet (1 mg) by mouth 2 times daily    Smoker

## 2018-11-13 NOTE — PROGRESS NOTES
SUBJECTIVE:   Jackie Quispe is a 28 year old female who presents to clinic today for the following health issues:      Musculoskeletal problem/pain      Duration: since last evening    Description  Location: right hand and left wrist    Intensity:  severe    Accompanying signs and symptoms: numbness, swelling and discoloration of hand, left wrist has a small bruise but the right hand is the concern    History  Previous similar problem: no   Previous evaluation:  none    Precipitating or alleviating factors:  Trauma or overuse: YES- slip on ice outside of her own apartment building  Aggravating factors include: use    Therapies tried and outcome: ice    Fell backwards, braced self with hands with more pressure on the right hand      Problem list and histories reviewed & adjusted, as indicated.  Additional history: as documented    Patient Active Problem List   Diagnosis     Smoker     Acne     Dysfunctional uterine bleeding     FIDELIA (generalized anxiety disorder)     CARDIOVASCULAR SCREENING; LDL GOAL LESS THAN 160     Mirena IUD- remove by 3/2022     Rib pain     Past Surgical History:   Procedure Laterality Date     MOUTH SURGERY      wisdom teeth       Social History   Substance Use Topics     Smoking status: Current Every Day Smoker     Packs/day: 0.25     Types: Cigarettes     Smokeless tobacco: Never Used     Alcohol use Yes     Family History   Problem Relation Age of Onset     Alcohol/Drug Maternal Grandmother      alcohol     Hypertension Paternal Grandfather      Cancer Paternal Grandfather      HEART DISEASE Paternal Grandfather      Cancer Maternal Grandfather      lung     Alzheimer Disease Paternal Grandmother      Family History Negative Mother      Cardiovascular Father 54     MI     HEART DISEASE Father          Current Outpatient Prescriptions   Medication Sig Dispense Refill     doxycycline monohydrate 100 MG capsule Take 1 capsule (100 mg) by mouth 2 times daily 60 capsule 1     levonorgestrel  "(MIRENA) 20 MCG/24HR IUD 1 each (20 mcg) by Intrauterine route once 7/8/2016 1 each 0     order for DME Equipment being ordered: right spica splint 1 Device 0     varenicline (CHANTIX) 1 MG tablet Take 1 tablet (1 mg) by mouth 2 times daily 56 tablet 3     No Known Allergies    Reviewed and updated as needed this visit by clinical staff  Tobacco  Allergies  Meds  Problems  Med Hx  Surg Hx  Fam Hx  Soc Hx        Reviewed and updated as needed this visit by Provider  Tobacco  Allergies  Meds  Problems  Med Hx  Surg Hx  Fam Hx  Soc Hx          ROS:  Constitutional, HEENT, cardiovascular, pulmonary, gi and gu systems are negative, except as otherwise noted.    OBJECTIVE:     /60  Pulse 97  Temp 99.6  F (37.6  C) (Tympanic)  Resp 18  Ht 5' 7\" (1.702 m)  Wt 122 lb 6.4 oz (55.5 kg)  SpO2 99%  Breastfeeding? No  BMI 19.17 kg/m2  Body mass index is 19.17 kg/(m^2).  GENERAL APPEARANCE: healthy, alert and no distress  MS: dorsum of right hand swollen, mildly ecchymotic and tender, peripheral pulses normal. Normal finger abduction, difficulty with finger adduction due to swelling and pain. Normal wrist range of motion and strength.     Diagnostic Test Results:  Xray - right hand - independently reviewed by CADY Hahn CNP not noting any apparent fracture, soft tissue swelling noted. Will follow final radiologist read.     ASSESSMENT/PLAN:     1. Sprain of hand, right, initial encounter  Patient had fall yesterday after slipping on ice, falling backwards onto both hands with more weight on right hand. Patient has significant dorsum swelling with generalized tenderness and mild ecchymosis. Xray independently read by .CADY Hahn CNP not noting any fracture - will follow radiologist read. Advised patient to wear brace, okay to take off several times a day to ice and elevate.   - order for DME; Equipment being ordered: right spica splint  Dispense: 1 Device; Refill: 0    2. Fall due to " slipping on ice or snow, initial encounter    - XR Hand Right G/E 3 Views; Future    3. Injury of right hand, initial encounter    - XR Hand Right G/E 3 Views; Future    Patient Instructions     Not seeing anything specific for fracture on xray - there is one area that is questionable that I would wait for the final radilogist read - I will contact you with results     Otherwise this is likely a hand sprain     Wear brace when active - otherwise can take off and elevate and ice when resting. Ice and elevate as much as possible    Okay for 600-800 mg of ibuprofen 2-3 times a day just until this improves     No work today and probable tomorrow if swelling still persists     A sprain may take several weeks to heal, return to clinic if not improving     Hand Sprain  A sprain is a stretching or tearing of the ligaments that hold a joint together. There are no broken bones. Sprains take 3 to 6 weeks, or longer to heal. A sprained hand may be treated with a splint or elastic wrap for support.  Home care    Keep your arm elevated to reduce pain and swelling. This is most important during the first 48 hours.    Apply an ice pack over the injured area for 15 to 20 minutes every 3 to 6 hours. You should do this for the first 24 to 48 hours. You can make an ice pack by filling a plastic bag that seals at the top with ice cubes and then wrapping it with a thin towel. Continue the use of ice packs for relief of pain and swelling as needed. As the ice melts, be careful to avoid getting any wrap or splint wet. After 48 hours, apply heat (warm shower or warm bath) for 15 to 20 minutes several times a day, or alternate ice and heat.    You may use over-the-counter pain medicine to control pain, unless another pain medicine was prescribed. If you have chronic liver or kidney disease or ever had a stomach ulcer or gastrointestinal bleeding, talk with your healthcare provider before using these medicines.    If you were given a splint  or elastic wrap, wear it until your pain improves.  Follow-up care  Follow up with your healthcare provider, or as advised. Sometimes fractures don t show up on the first X-ray. Bruises and sprains can sometimes hurt as much as a fracture. These injuries can take time to heal completely. If your symptoms don t improve or they get worse, talk with your healthcare provider. You may need a repeat X-ray or other tests.  When to seek medical advice  Call your healthcare provider right away if any of these occur:    Pain or swelling increases    Fingers or hand becomes cold, blue, numb, or tingly   Date Last Reviewed: 5/1/2018 2000-2018 [employer]. [address] [place] [address], [place] [address]. All rights reserved. This information is not intended as a substitute for professional medical care. Always follow your healthcare professional's instructions.            CADY Disla [facility]

## 2018-11-14 ENCOUNTER — TELEPHONE (OUTPATIENT)
Dept: FAMILY MEDICINE | Facility: CLINIC | Age: 28
End: 2018-11-14

## 2018-11-14 NOTE — LETTER
[facility]  [address]  [facility] [address]  [phone]          November 14, 2018    Jackie Quispe                                                                                                                     [address] [place] CARABALLO [address] [place] [id number]      To Whom it May Concern,    Jackie Quispe is under my professional care and was seen in office yesterday 11/13/2018 for injury. She can return to work as of today 11/14/2018 with accommodation of wearing brace on right hand until she regains improved mobility and less pain with activity of the right hand.     Feel free to contact the clinic with any further questions or concerns,      Sincerely,     CADY Hahn CNP

## 2018-11-14 NOTE — TELEPHONE ENCOUNTER
I am fine with her going back to work with restrictions as the result note identified this morning there is no fracture. I have written a new note and we can contact patient to determine how to get it to her.     Thanks  CADY Hahn CNP

## 2018-11-14 NOTE — TELEPHONE ENCOUNTER
Pt called looking for status on this. She will probably have to just leave work but would still like a call back. Thanks  Bryanna Kelly  CSS

## 2018-11-14 NOTE — TELEPHONE ENCOUNTER
Patient was seen yesterday for her hand and was given a note to be off work yesterday and today. She is calling stating work could accommodate her but with what the note says, they cannot. IF Trish would be okay with patient going back to work today with restrictions, she can. Otherwise, if Trish feels she really shouldn't go to work, that is fine too. She is also still waiting to hear back on the x-ray.    Please advise.    Mariana [facility] [place]

## 2018-11-15 ENCOUNTER — TELEPHONE (OUTPATIENT)
Dept: FAMILY MEDICINE | Facility: CLINIC | Age: 28
End: 2018-11-15

## 2018-11-15 NOTE — TELEPHONE ENCOUNTER
[facility], at [phone].    Moni Baez  [facility]

## 2018-11-20 ENCOUNTER — MYC MEDICAL ADVICE (OUTPATIENT)
Dept: FAMILY MEDICINE | Facility: CLINIC | Age: 28
End: 2018-11-20

## 2018-11-20 DIAGNOSIS — S63.91XS: Primary | ICD-10-CM

## 2018-11-20 NOTE — TELEPHONE ENCOUNTER
11-13-18 OV, rt hand x-ray reviewed- no fx.  Trish, please advise further F/U with you? Referral?  KALEIGH Bojorquez RN

## 2018-12-06 ENCOUNTER — OFFICE VISIT (OUTPATIENT)
Dept: FAMILY MEDICINE | Facility: CLINIC | Age: 28
End: 2018-12-06
Payer: COMMERCIAL

## 2018-12-06 VITALS
SYSTOLIC BLOOD PRESSURE: 110 MMHG | TEMPERATURE: 96.7 F | HEART RATE: 92 BPM | WEIGHT: 124.2 LBS | DIASTOLIC BLOOD PRESSURE: 72 MMHG | BODY MASS INDEX: 19.45 KG/M2

## 2018-12-06 DIAGNOSIS — J02.9 SORE THROAT: ICD-10-CM

## 2018-12-06 DIAGNOSIS — J03.90 TONSILLITIS: Primary | ICD-10-CM

## 2018-12-06 LAB
DEPRECATED S PYO AG THROAT QL EIA: NORMAL
SPECIMEN SOURCE: NORMAL

## 2018-12-06 PROCEDURE — 99213 OFFICE O/P EST LOW 20 MIN: CPT | Performed by: NURSE PRACTITIONER

## 2018-12-06 PROCEDURE — 87880 STREP A ASSAY W/OPTIC: CPT | Performed by: NURSE PRACTITIONER

## 2018-12-06 PROCEDURE — 87081 CULTURE SCREEN ONLY: CPT | Performed by: NURSE PRACTITIONER

## 2018-12-06 RX ORDER — AMOXICILLIN 875 MG
875 TABLET ORAL 2 TIMES DAILY
Qty: 20 TABLET | Refills: 0 | Status: SHIPPED | OUTPATIENT
Start: 2018-12-06 | End: 2018-12-27

## 2018-12-06 NOTE — PROGRESS NOTES
SUBJECTIVE:   Jackie Quispe is a 28 year old female who presents to clinic today for the following health issues:    ENT Symptoms             Symptoms: cc Present Absent Comment   Fever/Chills  x  Chills   Fatigue  x     Muscle Aches   x    Eye Irritation   x    Sneezing  x     Nasal Dean/Drg  x     Sinus Pressure/Pain    Patient states that she does have a headache    Loss of smell   x    Dental pain   x    Sore Throat  x     Swollen Glands   x    Ear Pain/Fullness  x     Cough  x     Wheeze  x     Chest Pain   x    Shortness of breath   x    Rash       Other         Symptom duration:  Off and on for a couple weeks    Symptom severity:     Treatments tried:     Contacts:  family         Problem list and histories reviewed & adjusted, as indicated.  Additional history: as documented    Patient Active Problem List   Diagnosis     Smoker     Acne     Dysfunctional uterine bleeding     FIDELIA (generalized anxiety disorder)     CARDIOVASCULAR SCREENING; LDL GOAL LESS THAN 160     Mirena IUD- remove by 3/2022     Rib pain     Past Surgical History:   Procedure Laterality Date     MOUTH SURGERY      wisdom teeth       Social History   Substance Use Topics     Smoking status: Current Every Day Smoker     Packs/day: 0.25     Types: Cigarettes     Smokeless tobacco: Never Used     Alcohol use Yes     Family History   Problem Relation Age of Onset     Alcohol/Drug Maternal Grandmother      alcohol     Hypertension Paternal Grandfather      Cancer Paternal Grandfather      Heart Disease Paternal Grandfather      Cancer Maternal Grandfather      lung     Alzheimer Disease Paternal Grandmother      Family History Negative Mother      Cardiovascular Father 54     MI     Heart Disease Father          Current Outpatient Prescriptions   Medication Sig Dispense Refill     doxycycline monohydrate 100 MG capsule Take 1 capsule (100 mg) by mouth 2 times daily 60 capsule 1     levonorgestrel (MIRENA) 20 MCG/24HR IUD 1 each (20 mcg) by  Intrauterine route once 7/8/2016 1 each 0     varenicline (CHANTIX) 1 MG tablet Take 1 tablet (1 mg) by mouth 2 times daily 56 tablet 3     No Known Allergies  Recent Labs   Lab Test  10/04/18   1404  09/24/18   1610  09/13/16   1750   ALT  20   --   17   CR  0.65   --   0.61   GFRESTIMATED  >90   --   >90  Non  GFR Calc     GFRESTBLACK  >90   --   >90   GFR Calc     POTASSIUM  3.6   --   3.9   TSH   --   0.21*  1.22      BP Readings from Last 3 Encounters:   12/06/18 110/72   11/13/18 106/60   10/04/18 122/72    Wt Readings from Last 3 Encounters:   12/06/18 124 lb 3.2 oz (56.3 kg)   11/13/18 122 lb 6.4 oz (55.5 kg)   10/04/18 127 lb (57.6 kg)                    Reviewed and updated as needed this visit by clinical staff       Reviewed and updated as needed this visit by Provider         ROS:  Constitutional, HEENT, cardiovascular, pulmonary, gi and gu systems are negative, except as otherwise noted.    OBJECTIVE:     /72 (BP Location: Right arm, Patient Position: Sitting, Cuff Size: Adult Regular)  Pulse 92  Temp 96.7  F (35.9  C) (Tympanic)  Wt 124 lb 3.2 oz (56.3 kg)  BMI 19.45 kg/m2  Body mass index is 19.45 kg/(m^2).  GENERAL: healthy, alert and no distress  EYES: Eyes grossly normal to inspection, PERRL and conjunctivae and sclerae normal  HENT: ear canals and TM's normal, nose and mouth without ulcers or lesions  HENT: tonsillar hypertrophy and tonsillar erythema  NECK: no adenopathy, no asymmetry, masses, or scars and thyroid normal to palpation  RESP: lungs clear to auscultation - no rales, rhonchi or wheezes  CV: regular rate and rhythm, normal S1 S2, no S3 or S4, no murmur, click or rub, no peripheral edema and peripheral pulses strong  MS: no gross musculoskeletal defects noted, no edema  SKIN: no suspicious lesions or rashes  NEURO: Normal strength and tone, mentation intact and speech normal  PSYCH: mentation appears normal, affect normal/bright      Results  for orders placed or performed in visit on 12/06/18   Strep, Rapid Screen   Result Value Ref Range    Specimen Description Throat     Rapid Strep A Screen       NEGATIVE: No Group A streptococcal antigen detected by immunoassay, await culture report.   Beta strep group A culture   Result Value Ref Range    Specimen Description Throat     Culture Micro No beta hemolytic Streptococcus Group A isolated          ASSESSMENT/PLAN:   (J03.90) Tonsillitis  (primary encounter diagnosis)  Comment: Based on clinical presentation symptoms we will treat for tonsillitis strep negative  Plan: amoxicillin (AMOXIL) 875 MG tablet         (J02.9) Sore throat  Comment:   Plan: Strep, Rapid Screen, Beta strep group A culture        CADY Iniguez [facility]

## 2018-12-06 NOTE — PATIENT INSTRUCTIONS
Symptomatic treatment with fluids, rest.  May use acetaminophen, ibuprofen prn.  RTC if any worsening symptoms or if not improving.   May return to work/school after 24 hours fever free.    Follow-up with your primary care provider next week and as needed.    Indications for emergent return to emergency department discussed with patient, who verbalized good understanding and agreement.  Patient understands the limitations of today's evaluation.         Pharyngitis: Strep (Presumed)    You have pharyngitis (sore throat). The healthcare staff think your sore throat is caused by streptococcus (strep) bacteria. This is often called strep throat. Strep throat can cause throat pain that is worse when swallowing, aching all over, headache, and fever. The infection is contagious. It may be spread by coughing, kissing, or touching others after touching your mouth or nose. Antibiotic medicine is given to treat the infection.  Home care    Rest at home. Drink plenty of fluids so you won t get dehydrated.    Stay home from work or school for the first 2 days of taking the antibiotics. After this time, you will not be contagious. You can then return to work or school if you are feeling better.     Take the antibiotic medicine for the full 10 days, even when you feel better. This is very important to make sure the infection is fully treated. It is also important to prevent medicine-resistant germs from growing. If you were given an antibiotic shot, no more antibiotics are needed.    You may use acetaminophen or ibuprofen to control pain or fever, unless another medicine was prescribed for this. If you have chronic liver or kidney disease or ever had a stomach ulcer or GI bleeding, talk with your healthcare provider before using these medicines.    Use throat lozenges or a throat-numbing spray to help reduce throat pain. Gargling with warm salt water can also help reduce throat pain. Dissolve 1/2 teaspoon of salt in 1 glass of  warm water.     Don t eat salty or spicy foods. These can irritate the throat.  Follow-up care  Follow up with your healthcare provider or our staff if you don't get better over the next week.  When to seek medical advice  Call your healthcare provider right away if any of these occur:    Fever as directed by your healthcare provider    New or worse ear pain, sinus pain, or headache    Painful lumps in the back of neck    Stiff neck    Lymph nodes that get larger    Can t swallow liquids, a lot of drooling, or can t open mouth wide due to throat pain    Signs of dehydration, such as very dark urine or no urine, sunken eyes, dizziness    Trouble breathing or noisy breathing    Muffled voice    New rash  Prevention  Here are steps you can take to help prevent an infection:    Keep good hand washing habits.    Don t have close contact with people who have sore throats, colds, or other upper respiratory infections.    Don t smoke, and stay away from secondhand smoke.    Stay up to date with of your vaccines.  Date Last Reviewed: 11/1/2017 2000-2018 [employer]. [address] [place] [address], [place] [address]. All rights reserved. This information is not intended as a substitute for professional medical care. Always follow your healthcare professional's instructions.

## 2018-12-06 NOTE — MR AVS SNAPSHOT
After Visit Summary   12/6/2018    Jackie Quispe    MRN: [id number]           Patient Information     Date Of Birth          1990        Visit Information        Provider Department      12/6/2018 12:40 PM Gwendolyn Adkins APRN [facility]        Today's Diagnoses     Sore throat    -  1    Tonsillitis          Care Instructions    Symptomatic treatment with fluids, rest.  May use acetaminophen, ibuprofen prn.  RTC if any worsening symptoms or if not improving.   May return to work/school after 24 hours fever free.    Follow-up with your primary care provider next week and as needed.    Indications for emergent return to emergency department discussed with patient, who verbalized good understanding and agreement.  Patient understands the limitations of today's evaluation.         Pharyngitis: Strep (Presumed)    You have pharyngitis (sore throat). The healthcare staff think your sore throat is caused by streptococcus (strep) bacteria. This is often called strep throat. Strep throat can cause throat pain that is worse when swallowing, aching all over, headache, and fever. The infection is contagious. It may be spread by coughing, kissing, or touching others after touching your mouth or nose. Antibiotic medicine is given to treat the infection.  Home care    Rest at home. Drink plenty of fluids so you won t get dehydrated.    Stay home from work or school for the first 2 days of taking the antibiotics. After this time, you will not be contagious. You can then return to work or school if you are feeling better.     Take the antibiotic medicine for the full 10 days, even when you feel better. This is very important to make sure the infection is fully treated. It is also important to prevent medicine-resistant germs from growing. If you were given an antibiotic shot, no more antibiotics are needed.    You may use acetaminophen or ibuprofen to control pain or fever, unless  another medicine was prescribed for this. If you have chronic liver or kidney disease or ever had a stomach ulcer or GI bleeding, talk with your healthcare provider before using these medicines.    Use throat lozenges or a throat-numbing spray to help reduce throat pain. Gargling with warm salt water can also help reduce throat pain. Dissolve 1/2 teaspoon of salt in 1 glass of warm water.     Don t eat salty or spicy foods. These can irritate the throat.  Follow-up care  Follow up with your healthcare provider or our staff if you don't get better over the next week.  When to seek medical advice  Call your healthcare provider right away if any of these occur:    Fever as directed by your healthcare provider    New or worse ear pain, sinus pain, or headache    Painful lumps in the back of neck    Stiff neck    Lymph nodes that get larger    Can t swallow liquids, a lot of drooling, or can t open mouth wide due to throat pain    Signs of dehydration, such as very dark urine or no urine, sunken eyes, dizziness    Trouble breathing or noisy breathing    Muffled voice    New rash  Prevention  Here are steps you can take to help prevent an infection:    Keep good hand washing habits.    Don t have close contact with people who have sore throats, colds, or other upper respiratory infections.    Don t smoke, and stay away from secondhand smoke.    Stay up to date with of your vaccines.  Date Last Reviewed: 11/1/2017 2000-2018 [employer]. [address] [place] [address]. All rights reserved. This information is not intended as a substitute for professional medical care. Always follow your healthcare professional's instructions.                Follow-ups after your visit        Who to contact     If you have questions or need follow up information about today's clinic visit or your schedule please contact [facility] directly at [phone].  Normal or non-critical lab and imaging  results will be communicated to you by [employer], letter or phone within 4 business days after the clinic has received the results. If you do not hear from us within 7 days, please contact the clinic through [employer] or phone. If you have a critical or abnormal lab result, we will notify you by phone as soon as possible.  Submit refill requests through [employer] or call your pharmacy and they will forward the refill request to us. Please allow 3 business days for your refill to be completed.          Additional Information About Your Visit        [employer] Information     [employer] gives you secure access to your electronic health record. If you see a primary care provider, you can also send messages to your care team and make appointments. If you have questions, please call your primary care clinic.  If you do not have a primary care provider, please call [phone] and they will assist you.        Care EveryWhere ID     This is your Care EveryWhere ID. This could be used by other organizations to access your [facility] medical records  [id number]        Your Vitals Were     Pulse Temperature BMI (Body Mass Index)             92 96.7  F (35.9  C) (Tympanic) 19.45 kg/m2          Blood Pressure from Last 3 Encounters:   12/06/18 110/72   11/13/18 106/60   10/04/18 122/72    Weight from Last 3 Encounters:   12/06/18 124 lb 3.2 oz (56.3 kg)   11/13/18 122 lb 6.4 oz (55.5 kg)   10/04/18 127 lb (57.6 kg)              We Performed the Following     Strep, Rapid Screen          Today's Medication Changes          These changes are accurate as of 12/6/18  1:24 PM.  If you have any questions, ask your nurse or doctor.               Start taking these medicines.        Dose/Directions    amoxicillin 875 MG tablet   Commonly known as:  AMOXIL   Used for:  Tonsillitis   Started by:  Gwendolyn Adkins APRN CNP        Dose:  875 mg   Take 1 tablet (875 mg) by mouth 2 times daily   Quantity:  20 tablet   Refills:  0            Where to  get your medicines      These medications [id number] [place] [place] [facility] [address] [facility]  [address] , [facility] [address]     Phone:  [phone]     amoxicillin 875 MG tablet                Primary Care Provider Office Phone # Fax #    Tammy Beth [phone]       [address] [facility] [address]        Equal Access to Services     DILMA QUINONES : Hadii aad ku hadasho Soomaali, waaxda luqadaha, qaybta kaalmada adeegyada, waxay idiin hayaan adeeg aliciadomingojazmin laamandeep . So [facility] [phone].    ATENCIÓN: Si habla esphailey, tiene a dang disposición servicios gratuitos de asistencia lingüística. Leonid al [phone].    We comply with applicable federal civil rights laws and [place] laws. We do not discriminate on the basis of race, color, national origin, age, disability, sex, sexual orientation, or gender identity.            Thank you!     Thank you for choosing [facility]  for your care. Our goal is always to provide you with excellent care. Hearing back from our patients is one way we can continue to improve our services. Please take a few minutes to complete the written survey that you may receive in the mail after your visit with us. Thank you!             Your Updated Medication List - Protect others around you: Learn how to safely use, store and throw away your medicines at www.disposemymeds.org.          This list is accurate as of 12/6/18  1:24 PM.  Always use your most recent med list.                   Brand Name Dispense Instructions for use Diagnosis    amoxicillin 875 MG tablet    AMOXIL    20 tablet    Take 1 tablet (875 mg) by mouth 2 times daily    Tonsillitis       doxycycline monohydrate 100 MG capsule    MONODOX    60 capsule    Take 1 capsule (100 mg) by mouth 2 times daily    Inflammatory acne       levonorgestrel 20 MCG/24HR IUD    MIRENA    1 each    1 each (20 mcg) by Intrauterine route once 7/8/2016        varenicline 1 MG  tablet    CHANTIX    56 tablet    Take 1 tablet (1 mg) by mouth 2 times daily    Smoker

## 2018-12-06 NOTE — LETTER
[facility]  [address] [place] [address]  Phone: [phone]  Fax: [phone]    December 6, 2018        Jackie Quispe  [address] [place] [address] [address] [place] [address]          To whom it may concern:    RE: Jackie Quispe    Patient was seen and treated today at our clinic and missed work.    Can return to work once fever free and on medications for 24 hours.      Please contact me for questions or concerns.      Sincerely,        CADY Iniguez CNP

## 2018-12-06 NOTE — NURSING NOTE
"Chief Complaint   Patient presents with     URI       Initial /72 (BP Location: Right arm, Patient Position: Sitting, Cuff Size: Adult Regular)  Pulse 92  Temp 96.7  F (35.9  C) (Tympanic)  Wt 124 lb 3.2 oz (56.3 kg)  BMI 19.45 kg/m2 Estimated body mass index is 19.45 kg/(m^2) as calculated from the following:    Height as of 11/13/18: 5' 7\" (1.702 m).    Weight as of this encounter: 124 lb 3.2 oz (56.3 kg).    Patient presents to the clinic using No DME    Health Maintenance that is potentially due pending provider review:  NONE    n/a    Is there anyone who you would like to be able to receive your results? No  If yes have patient fill out JESSICA    Michela Arellano CMA    "

## 2018-12-07 LAB
BACTERIA SPEC CULT: NORMAL
SPECIMEN SOURCE: NORMAL

## 2018-12-26 ENCOUNTER — TELEPHONE (OUTPATIENT)
Dept: FAMILY MEDICINE | Facility: CLINIC | Age: 28
End: 2018-12-26

## 2018-12-26 NOTE — TELEPHONE ENCOUNTER
Reason for Call:  Other     Detailed comments: Patient was seen 12/06/18 and given a antibiotic - She is feeling bad again and would like a refill - please call pt    Phone Number Patient can be reached at: Cell number on file:    Telephone Information:   Mobile [phone]       Best Time:     Can we leave a detailed message on this number? YES    Call taken on 12/26/2018 at 1:21 PM by Tammy Meade

## 2018-12-27 ENCOUNTER — OFFICE VISIT (OUTPATIENT)
Dept: FAMILY MEDICINE | Facility: CLINIC | Age: 28
End: 2018-12-27
Payer: COMMERCIAL

## 2018-12-27 VITALS
BODY MASS INDEX: 19.55 KG/M2 | HEART RATE: 104 BPM | TEMPERATURE: 98.3 F | WEIGHT: 124.8 LBS | OXYGEN SATURATION: 99 % | DIASTOLIC BLOOD PRESSURE: 74 MMHG | SYSTOLIC BLOOD PRESSURE: 128 MMHG | RESPIRATION RATE: 18 BRPM

## 2018-12-27 DIAGNOSIS — B37.31 CANDIDIASIS OF VAGINA: ICD-10-CM

## 2018-12-27 DIAGNOSIS — L70.8 INFLAMMATORY ACNE: ICD-10-CM

## 2018-12-27 DIAGNOSIS — J03.90 ACUTE TONSILLITIS, UNSPECIFIED ETIOLOGY: Primary | ICD-10-CM

## 2018-12-27 LAB
DEPRECATED S PYO AG THROAT QL EIA: NORMAL
SPECIMEN SOURCE: NORMAL

## 2018-12-27 PROCEDURE — 87880 STREP A ASSAY W/OPTIC: CPT | Performed by: FAMILY MEDICINE

## 2018-12-27 PROCEDURE — 99214 OFFICE O/P EST MOD 30 MIN: CPT | Performed by: FAMILY MEDICINE

## 2018-12-27 PROCEDURE — 87081 CULTURE SCREEN ONLY: CPT | Performed by: FAMILY MEDICINE

## 2018-12-27 RX ORDER — DOXYCYCLINE 100 MG/1
100 CAPSULE ORAL 2 TIMES DAILY
Qty: 60 CAPSULE | Refills: 1 | Status: SHIPPED | OUTPATIENT
Start: 2018-12-27 | End: 2019-03-18

## 2018-12-27 RX ORDER — FLUCONAZOLE 150 MG/1
150 TABLET ORAL ONCE
Qty: 1 TABLET | Refills: 0 | Status: SHIPPED | OUTPATIENT
Start: 2018-12-27 | End: 2019-03-18

## 2018-12-27 ASSESSMENT — PAIN SCALES - GENERAL: PAINLEVEL: SEVERE PAIN (7)

## 2018-12-27 NOTE — PROGRESS NOTES
SUBJECTIVE:   Jackie Quispe is a 28 year old female who presents to clinic today for the following health issues:      ENT SYMPTOMS      Duration: 5 days of sore throat and cough for 3+ weeks    Description  sore throat, cough, headache     Severity: moderate    Accompanying signs and symptoms: None    History (predisposing factors):  smokes    Precipitating or alleviating factors: treated with Amoxicillin on 12/6/2018 for tonsillitis, did get better but now sore throat again    Therapies tried and outcome:  acetaminophen OTC NSAID        Problem list and histories reviewed & adjusted, as indicated.  Additional history: as documented    Patient Active Problem List   Diagnosis     Smoker     Acne     Dysfunctional uterine bleeding     FIDELIA (generalized anxiety disorder)     CARDIOVASCULAR SCREENING; LDL GOAL LESS THAN 160     Mirena IUD- remove by 3/2022     Rib pain     Past Surgical History:   Procedure Laterality Date     MOUTH SURGERY      wisdom teeth       Social History     Tobacco Use     Smoking status: Current Every Day Smoker     Packs/day: 0.25     Types: Cigarettes     Smokeless tobacco: Never Used   Substance Use Topics     Alcohol use: Yes     Family History   Problem Relation Age of Onset     Alcohol/Drug Maternal Grandmother         alcohol     Hypertension Paternal Grandfather      Cancer Paternal Grandfather      Heart Disease Paternal Grandfather      Cancer Maternal Grandfather         lung     Alzheimer Disease Paternal Grandmother      Family History Negative Mother      Cardiovascular Father 54        MI     Heart Disease Father          Current Outpatient Medications   Medication Sig Dispense Refill     doxycycline monohydrate 100 MG capsule Take 1 capsule (100 mg) by mouth 2 times daily (Patient not taking: Reported on 12/27/2018) 60 capsule 1     levonorgestrel (MIRENA) 20 MCG/24HR IUD 1 each (20 mcg) by Intrauterine route once 7/8/2016 1 each 0     varenicline (CHANTIX) 1 MG tablet  Take 1 tablet (1 mg) by mouth 2 times daily (Patient not taking: Reported on 12/27/2018) 56 tablet 3     No Known Allergies  Recent Labs   Lab Test 10/04/18  1404 09/24/18  1610 09/13/16  1750   ALT 20  --  17   CR 0.65  --  0.61   GFRESTIMATED >90  --  >90  Non  GFR Calc     GFRESTBLACK >90  --  >90  African American GFR Calc     POTASSIUM 3.6  --  3.9   TSH  --  0.21* 1.22      BP Readings from Last 3 Encounters:   12/27/18 128/74   12/06/18 110/72   11/13/18 106/60    Wt Readings from Last 3 Encounters:   12/27/18 56.6 kg (124 lb 12.8 oz)   12/06/18 56.3 kg (124 lb 3.2 oz)   11/13/18 55.5 kg (122 lb 6.4 oz)                  Labs reviewed in EPIC    Reviewed and updated as needed this visit by clinical staff       Reviewed and updated as needed this visit by Provider         ROS:   ROS: 10 point ROS neg other than the symptoms noted above in the HPI.      OBJECTIVE:     /74   Pulse 104   Temp 98.3  F (36.8  C)   Resp 18   Wt 56.6 kg (124 lb 12.8 oz)   SpO2 99%   BMI 19.55 kg/m    Body mass index is 19.55 kg/m .  GENERAL: healthy, alert and no distress  EYES: Eyes grossly normal to inspection, PERRL and conjunctivae and sclerae normal  HENT: normal cephalic/atraumatic, ear canals and TM's normal, oral mucous membranes moist, oropharxnx crowded and tonsillar erythema  NECK: no adenopathy, no asymmetry, masses, or scars and thyroid normal to palpation  RESP: lungs clear to auscultation - no rales, rhonchi or wheezes  CV: regular rates and rhythm, normal S1 S2, no S3 or S4 and no murmur, click or rub  ABDOMEN: soft, nontender, without hepatosplenomegaly or masses  SKIN: Few acne lesions involving facial skin      Diagnostic Test Results:  Results for orders placed or performed in visit on 12/27/18 (from the past 24 hour(s))   Strep, Rapid Screen   Result Value Ref Range    Specimen Description Throat     Rapid Strep A Screen       NEGATIVE: No Group A streptococcal antigen detected by  immunoassay, await culture report.       ASSESSMENT/PLAN:         1. Acute tonsillitis, unspecified etiology  -Rapid strep throat result came back negative, treatment options discussed including waitful watching, patient would like to start antibiotic as symptoms were better on antibiotics about 10 days ago.  Augmentin prescribed, common side effects discussed  - Strep, Rapid Screen  - Beta strep group A culture  - amoxicillin-clavulanate (AUGMENTIN) 875-125 MG tablet; Take 1 tablet by mouth 2 times daily for 10 days  Dispense: 20 tablet; Refill: 0      2. Candidiasis of vagina  -Usually gets vaginal candidiasis with antibiotic use.  Diflucan prescribed for PRN use  - fluconazole (DIFLUCAN) 150 MG tablet; Take 1 tablet (150 mg) by mouth once for 1 dose  Dispense: 1 tablet; Refill: 0      3. Inflammatory acne  -Known to have inflammatory acne, doxycycline working well, medication refilled  - doxycycline monohydrate (MONODOX) 100 MG capsule; Take 1 capsule (100 mg) by mouth 2 times daily  Dispense: 60 capsule; Refill: 1        Patient Instructions       Patient Education     Self-Care for Sore Throats    Sore throats happen for many reasons, such as colds, allergies, and infections caused by viruses or bacteria. In any case, your throat becomes red and sore. Your goal for self-care is to reduce your discomfort while giving your throat a chance to heal.  Moisten and soothe your throat  Tips include the following:    Try a sip of water first thing after waking up.    Keep your throat moist by drinking 6 or more glasses of clear liquids every day.    Run a cool-air humidifier in your room overnight.    Avoid cigarette smoke.     Suck on throat lozenges, cough drops, hard candy, ice chips, or frozen fruit-juice bars. Use the sugar-free versions if your diet or medical condition requires them.  Gargle to ease irritation  Gargling every hour or 2 can ease irritation. Try gargling with 1 of these solutions:    1/4 teaspoon  of salt in 1/2 cup of warm water    An over-the-counter anesthetic gargle  Use medicine for more relief  Over-the-counter medicine can reduce sore throat symptoms. Ask your pharmacist if you have questions about which medicine to use:    Ease pain with anesthetic sprays. Aspirin or an aspirin substitute also helps. Remember, never give aspirin to anyone 18 or younger, or if you are already taking blood thinners.     For sore throats caused by allergies, try antihistamines to block the allergic reaction.    Remember: unless a sore throat is caused by a bacterial infection, antibiotics won t help you.  Prevent future sore throats  Prevention tips include the following:    Stop smoking or reduce contact with secondhand smoke. Smoke irritates the tender throat lining.    Limit contact with pets and with allergy-causing substances, such as pollen and mold.    When you re around someone with a sore throat or cold, wash your hands often to keep viruses or bacteria from spreading.    Don t strain your vocal cords.  Call your healthcare provider  Contact your healthcare provider if you have:    A temperature over 101 F (38.3 C)    White spots on the throat    Great difficulty swallowing    Trouble breathing    A skin rash    Recent exposure to someone else with strep bacteria    Severe hoarseness and swollen glands in the neck or jaw   Date Last Reviewed: 8/1/2016 2000-2018 [employer]. [address] [place] [address], [place] [address]. All rights reserved. This information is not intended as a substitute for professional medical care. Always follow your healthcare professional's instructions.               Tru Savage MD  [facility]

## 2018-12-27 NOTE — LETTER
December 27, 2018      Jackie Jose Enrique  [address] [place] [id number]        To Whom It May Concern:        Jackie Quispe was seen in our clinic. Kindly excuse her work absence today and tomorrow.             Sincerely,            Tru Savage MD

## 2018-12-27 NOTE — PATIENT INSTRUCTIONS
Patient Education     Self-Care for Sore Throats    Sore throats happen for many reasons, such as colds, allergies, and infections caused by viruses or bacteria. In any case, your throat becomes red and sore. Your goal for self-care is to reduce your discomfort while giving your throat a chance to heal.  Moisten and soothe your throat  Tips include the following:    Try a sip of water first thing after waking up.    Keep your throat moist by drinking 6 or more glasses of clear liquids every day.    Run a cool-air humidifier in your room overnight.    Avoid cigarette smoke.     Suck on throat lozenges, cough drops, hard candy, ice chips, or frozen fruit-juice bars. Use the sugar-free versions if your diet or medical condition requires them.  Gargle to ease irritation  Gargling every hour or 2 can ease irritation. Try gargling with 1 of these solutions:    1/4 teaspoon of salt in 1/2 cup of warm water    An over-the-counter anesthetic gargle  Use medicine for more relief  Over-the-counter medicine can reduce sore throat symptoms. Ask your pharmacist if you have questions about which medicine to use:    Ease pain with anesthetic sprays. Aspirin or an aspirin substitute also helps. Remember, never give aspirin to anyone 18 or younger, or if you are already taking blood thinners.     For sore throats caused by allergies, try antihistamines to block the allergic reaction.    Remember: unless a sore throat is caused by a bacterial infection, antibiotics won t help you.  Prevent future sore throats  Prevention tips include the following:    Stop smoking or reduce contact with secondhand smoke. Smoke irritates the tender throat lining.    Limit contact with pets and with allergy-causing substances, such as pollen and mold.    When you re around someone with a sore throat or cold, wash your hands often to keep viruses or bacteria from spreading.    Don t strain your vocal cords.  Call your healthcare provider  Contact your  healthcare provider if you have:    A temperature over 101 F (38.3 C)    White spots on the throat    Great difficulty swallowing    Trouble breathing    A skin rash    Recent exposure to someone else with strep bacteria    Severe hoarseness and swollen glands in the neck or jaw   Date Last Reviewed: 8/1/2016 2000-2018 [employer]. [address] [place], [place] [address]. All rights reserved. This information is not intended as a substitute for professional medical care. Always follow your healthcare professional's instructions.

## 2018-12-27 NOTE — NURSING NOTE
"Chief Complaint   Patient presents with     Throat Problem       Initial There were no vitals taken for this visit. Estimated body mass index is 19.45 kg/m  as calculated from the following:    Height as of 11/13/18: 1.702 m (5' 7\").    Weight as of 12/6/18: 56.3 kg (124 lb 3.2 oz).    Patient presents to the clinic using No DME    Health Maintenance that is potentially due pending provider review:  NONE    n/a    Is there anyone who you would like to be able to receive your results? No  If yes have patient fill out JESSICA    "

## 2018-12-28 LAB
BACTERIA SPEC CULT: NORMAL
SPECIMEN SOURCE: NORMAL

## 2019-01-14 ENCOUNTER — OFFICE VISIT (OUTPATIENT)
Dept: FAMILY MEDICINE | Facility: CLINIC | Age: 29
End: 2019-01-14
Payer: COMMERCIAL

## 2019-01-14 VITALS
DIASTOLIC BLOOD PRESSURE: 70 MMHG | HEART RATE: 87 BPM | TEMPERATURE: 99.1 F | OXYGEN SATURATION: 99 % | SYSTOLIC BLOOD PRESSURE: 120 MMHG | BODY MASS INDEX: 19.73 KG/M2 | WEIGHT: 126 LBS | RESPIRATION RATE: 12 BRPM

## 2019-01-14 DIAGNOSIS — F41.1 GAD (GENERALIZED ANXIETY DISORDER): Primary | ICD-10-CM

## 2019-01-14 PROCEDURE — 99214 OFFICE O/P EST MOD 30 MIN: CPT | Performed by: NURSE PRACTITIONER

## 2019-01-14 RX ORDER — HYDROXYZINE PAMOATE 25 MG/1
25 CAPSULE ORAL 3 TIMES DAILY PRN
Qty: 30 CAPSULE | Refills: 1 | Status: SHIPPED | OUTPATIENT
Start: 2019-01-14 | End: 2019-03-18

## 2019-01-14 RX ORDER — SERTRALINE HYDROCHLORIDE 25 MG/1
TABLET, FILM COATED ORAL
Qty: 30 TABLET | Refills: 1 | Status: SHIPPED | OUTPATIENT
Start: 2019-01-14 | End: 2019-03-18

## 2019-01-14 SDOH — HEALTH STABILITY: MENTAL HEALTH: HOW OFTEN DO YOU HAVE A DRINK CONTAINING ALCOHOL?: MONTHLY OR LESS

## 2019-01-14 ASSESSMENT — PATIENT HEALTH QUESTIONNAIRE - PHQ9
SUM OF ALL RESPONSES TO PHQ QUESTIONS 1-9: 11
SUM OF ALL RESPONSES TO PHQ QUESTIONS 1-9: 11
10. IF YOU CHECKED OFF ANY PROBLEMS, HOW DIFFICULT HAVE THESE PROBLEMS MADE IT FOR YOU TO DO YOUR WORK, TAKE CARE OF THINGS AT HOME, OR GET ALONG WITH OTHER PEOPLE: VERY DIFFICULT

## 2019-01-14 ASSESSMENT — ANXIETY QUESTIONNAIRES
5. BEING SO RESTLESS THAT IT IS HARD TO SIT STILL: MORE THAN HALF THE DAYS
2. NOT BEING ABLE TO STOP OR CONTROL WORRYING: NEARLY EVERY DAY
GAD7 TOTAL SCORE: 15
3. WORRYING TOO MUCH ABOUT DIFFERENT THINGS: NEARLY EVERY DAY
GAD7 TOTAL SCORE: 15
1. FEELING NERVOUS, ANXIOUS, OR ON EDGE: NEARLY EVERY DAY
7. FEELING AFRAID AS IF SOMETHING AWFUL MIGHT HAPPEN: NOT AT ALL
GAD7 TOTAL SCORE: 15
4. TROUBLE RELAXING: MORE THAN HALF THE DAYS
6. BECOMING EASILY ANNOYED OR IRRITABLE: MORE THAN HALF THE DAYS

## 2019-01-14 ASSESSMENT — PAIN SCALES - GENERAL: PAINLEVEL: NO PAIN (0)

## 2019-01-14 NOTE — NURSING NOTE
"Chief Complaint   Patient presents with     Anxiety       Initial /70   Pulse 87   Temp 99.1  F (37.3  C) (Tympanic)   Resp 12   Wt 57.2 kg (126 lb)   SpO2 99%   BMI 19.73 kg/m   Estimated body mass index is 19.73 kg/m  as calculated from the following:    Height as of 11/13/18: 1.702 m (5' 7\").    Weight as of this encounter: 57.2 kg (126 lb).    Patient presents to the clinic using No DME    Health Maintenance that is potentially due pending provider review:  NONE    n/a    Is there anyone who you would like to be able to receive your results? No  If yes have patient fill out JESSICA      "

## 2019-01-14 NOTE — PROGRESS NOTES
SUBJECTIVE:   Jackie Quispe is a 28 year old female who presents to clinic today for the following health issues:      Abnormal Mood Symptoms      Duration: 1 month    Description:  Depression: no  Anxiety: YES  Panic attacks: YES     Accompanying signs and symptoms: see PHQ-9 and FIDELIA scores    History (similar episodes/previous evaluation): has been treated before, didn't like the feeling on medication - made her feel shaky, didn't feel good on med    Precipitating or alleviating factors: None    Therapies tried and outcome: Lexapro (Escitalopram)     Significant life event changes: stress with kid's dad    PHQ-9 ([employer]) 1/14/2019   1.  Little interest or pleasure in doing things Several days   2.  Feeling down, depressed, or hopeless Several days   3.  Trouble falling or staying asleep, or sleeping too much More than half the days   4.  Feeling tired or having little energy More than half the days   5.  Poor appetite or overeating More than half the days   6.  Feeling bad about yourself Several days   7.  Trouble concentrating More than half the days   8.  Moving slowly or restless Not at all   9.  Suicidal or self-harm thoughts Not at all   [facility] TOTAL SCORE-----> 11 (Moderate depression)   Difficulty at work, home, or with people Very difficult     FIDELIA-7   [employer], 2002; Used with Permission) 1/14/2019   1. Feeling nervous, anxious, or on edge Nearly every day   2. Not being able to stop or control worrying Nearly every day   3. Worrying too much about different things Nearly every day   4. Trouble relaxing More than half the days   5. Being so restless that it is hard to sit still More than half the days   6. Becoming easily annoyed or irritable More than half the days   7. Feeling afraid, as if something awful might happen Not at all   FIDELIA 7 TOTAL SCORE 15 (severe anxiety)             Problem list and histories reviewed & adjusted, as indicated.  Additional history: as  documented    Patient Active Problem List   Diagnosis     Smoker     Acne     Dysfunctional uterine bleeding     FIDELIA (generalized anxiety disorder)     CARDIOVASCULAR SCREENING; LDL GOAL LESS THAN 160     Mirena IUD- remove by 3/2022     Rib pain     Past Surgical History:   Procedure Laterality Date     MOUTH SURGERY      wisdom teeth       Social History     Tobacco Use     Smoking status: Current Every Day Smoker     Packs/day: 0.75     Types: Cigarettes     Smokeless tobacco: Never Used   Substance Use Topics     Alcohol use: Yes     Frequency: Monthly or less     Family History   Problem Relation Age of Onset     Alcohol/Drug Maternal Grandmother         alcohol     Hypertension Paternal Grandfather      Cancer Paternal Grandfather      Heart Disease Paternal Grandfather      Cancer Maternal Grandfather         lung     Alzheimer Disease Paternal Grandmother      Family History Negative Mother      Cardiovascular Father 54        MI     Heart Disease Father            Reviewed and updated as needed this visit by clinical staff  Allergies       Reviewed and updated as needed this visit by Provider         ROS:  Constitutional, HEENT, cardiovascular, pulmonary, gi and gu systems are negative, except as otherwise noted.    OBJECTIVE:                                                    /70   Pulse 87   Temp 99.1  F (37.3  C) (Tympanic)   Resp 12   Wt 57.2 kg (126 lb)   SpO2 99%   BMI 19.73 kg/m    Body mass index is 19.73 kg/m .  GENERAL APPEARANCE: healthy, alert and no distress  PSYCH: mentation appears normal and anxious    Diagnostic test results:  Diagnostic Test Results:  none      ASSESSMENT/PLAN:                                                      1. FIDELIA (generalized anxiety disorder)  Felt like symptoms had been better in the past while on lexapro but did not like the side effects   Will try zoloft   Consider restarting counseling   - sertraline (ZOLOFT) 25 MG tablet; Take 1/2 tab for 1 week  and then increase to full tab  Dispense: 30 tablet; Refill: 1    Vistaril three times a day as needed for anxiety     Follow up in 6 week       Ernestine Campos NP  [facility]

## 2019-01-14 NOTE — PATIENT INSTRUCTIONS
1. FIDELIA (generalized anxiety disorder)  Will try zoloft   Consider restarting counseling   - sertraline (ZOLOFT) 25 MG tablet; Take 1/2 tab for 1 week and then increase to full tab  Dispense: 30 tablet; Refill: 1    Vistaril three times a day as needed for anxiety     Follow up in 6 week

## 2019-01-15 ASSESSMENT — ANXIETY QUESTIONNAIRES: GAD7 TOTAL SCORE: 15

## 2019-01-25 ENCOUNTER — OFFICE VISIT (OUTPATIENT)
Dept: FAMILY MEDICINE | Facility: CLINIC | Age: 29
End: 2019-01-25
Payer: COMMERCIAL

## 2019-01-25 VITALS
TEMPERATURE: 97.9 F | DIASTOLIC BLOOD PRESSURE: 70 MMHG | SYSTOLIC BLOOD PRESSURE: 120 MMHG | BODY MASS INDEX: 18.95 KG/M2 | HEART RATE: 98 BPM | WEIGHT: 121 LBS | OXYGEN SATURATION: 98 %

## 2019-01-25 DIAGNOSIS — Z11.3 SCREEN FOR STD (SEXUALLY TRANSMITTED DISEASE): Primary | ICD-10-CM

## 2019-01-25 LAB
SPECIMEN SOURCE: NORMAL
WET PREP SPEC: NORMAL

## 2019-01-25 PROCEDURE — 87591 N.GONORRHOEAE DNA AMP PROB: CPT | Performed by: NURSE PRACTITIONER

## 2019-01-25 PROCEDURE — 87491 CHLMYD TRACH DNA AMP PROBE: CPT | Performed by: NURSE PRACTITIONER

## 2019-01-25 PROCEDURE — 99213 OFFICE O/P EST LOW 20 MIN: CPT | Performed by: NURSE PRACTITIONER

## 2019-01-25 PROCEDURE — 87210 SMEAR WET MOUNT SALINE/INK: CPT | Performed by: NURSE PRACTITIONER

## 2019-01-30 DIAGNOSIS — Z11.3 SCREEN FOR STD (SEXUALLY TRANSMITTED DISEASE): ICD-10-CM

## 2019-01-30 PROCEDURE — 87389 HIV-1 AG W/HIV-1&-2 AB AG IA: CPT | Performed by: NURSE PRACTITIONER

## 2019-01-30 PROCEDURE — 86803 HEPATITIS C AB TEST: CPT | Performed by: NURSE PRACTITIONER

## 2019-01-30 PROCEDURE — 36415 COLL VENOUS BLD VENIPUNCTURE: CPT | Performed by: NURSE PRACTITIONER

## 2019-01-30 PROCEDURE — 86706 HEP B SURFACE ANTIBODY: CPT | Performed by: NURSE PRACTITIONER

## 2019-01-30 NOTE — LETTER
January 31, 2019      Jackie Quispe  [address] [place]   [facility] [address]        Dear ,    We are writing to inform you of your test results.    HIV negative  Hep C negative  Hep B immunization    Resulted Orders   **HIV Antigen Antibody Combo FUTURE anytime   Result Value Ref Range    HIV Antigen Antibody Combo Nonreactive NR^Nonreactive          Comment:      HIV-1 p24 Ag & HIV-1/HIV-2 Ab Not Detected   Hepatitis C antibody   Result Value Ref Range    Hepatitis C Antibody Nonreactive NR^Nonreactive      Comment:      Assay performance characteristics have not been established for newborns,   infants, and children     Hepatitis B Surface Antibody   Result Value Ref Range    Hepatitis B Surface Antibody 28.23 (H) <8.00 m[IU]/mL      Comment:      Reactive, Patient is considered to be immune to infection with hepatitis B   when the value is greater than or equal to 12.0 m[IU]/mL.         If you have any questions or concerns, please call the clinic at the number listed above.       Sincerely,      JOHN Campos CNP/zac

## 2019-01-31 LAB
HBV SURFACE AB SERPL IA-ACNC: 28.23 M[IU]/ML
HCV AB SERPL QL IA: NONREACTIVE
HIV 1+2 AB+HIV1 P24 AG SERPL QL IA: NONREACTIVE

## 2019-01-31 NOTE — RESULT ENCOUNTER NOTE
Dear Jackie,  HIV negative  Hep C negative  Hep B immunization  Please contact our clinic via phone or My Chart if you have any questions or concerns.  Thanks,  ROBERT Mcduffie

## 2019-02-12 ENCOUNTER — OFFICE VISIT (OUTPATIENT)
Dept: FAMILY MEDICINE | Facility: CLINIC | Age: 29
End: 2019-02-12
Payer: COMMERCIAL

## 2019-02-12 VITALS
DIASTOLIC BLOOD PRESSURE: 70 MMHG | SYSTOLIC BLOOD PRESSURE: 110 MMHG | WEIGHT: 120 LBS | TEMPERATURE: 98.5 F | HEART RATE: 77 BPM | BODY MASS INDEX: 18.79 KG/M2 | OXYGEN SATURATION: 98 %

## 2019-02-12 DIAGNOSIS — J02.9 SORE THROAT: ICD-10-CM

## 2019-02-12 DIAGNOSIS — J03.91 RECURRENT TONSILLITIS: Primary | ICD-10-CM

## 2019-02-12 LAB
DEPRECATED S PYO AG THROAT QL EIA: NORMAL
SPECIMEN SOURCE: NORMAL

## 2019-02-12 PROCEDURE — 87880 STREP A ASSAY W/OPTIC: CPT | Performed by: FAMILY MEDICINE

## 2019-02-12 PROCEDURE — 99213 OFFICE O/P EST LOW 20 MIN: CPT | Performed by: FAMILY MEDICINE

## 2019-02-12 PROCEDURE — 87081 CULTURE SCREEN ONLY: CPT | Performed by: FAMILY MEDICINE

## 2019-02-12 NOTE — PATIENT INSTRUCTIONS
Patient Education     When You Have a Sore Throat    A sore throat can be painful. There are many reasons why you may have a sore throat. Your healthcare provider will work with you to find the cause of your sore throat. He or she will also find the best treatment for you.  What causes a sore throat?  Sore throats can be caused or worsened by:    Cold or flu viruses    Bacteria    Irritants such as tobacco smoke or air pollution    Acid reflux  A healthy throat  The tonsils are on the sides of the throat near the base of the tongue. They collect viruses and bacteria and help fight infection. The throat (pharynx) is the passage for air. Mucus from the nasal cavity also moves down the passage.  An inflamed throat  The tonsils and pharynx can become inflamed due to a cold or flu virus. Postnasal drip (excess mucus draining from the nasal cavity) can irritate the throat. It can also make the throat or tonsils more likely to be infected by bacteria. Severe, untreated tonsillitis in children or adults can cause a pocket of pus (abscess) to form near the tonsil.  Your evaluation  A medical evaluation can help find the cause of your sore throat. It can also help your healthcare provider choose the best treatment for you. The evaluation may include a health history, physical exam, and diagnostic tests.  Health history  Your healthcare provider may ask you the following:    How long has the sore throat lasted and how have you been treating it?    Do you have any other symptoms, such as body aches, fever, or cough?    Does your sore throat recur? If so, how often? How many days of school or work have you missed because of a sore throat?    Do you have trouble eating or swallowing?    Have you been told that you snore or have other sleep problems?    Do you have bad breath?    Do you cough up bad-tasting mucus?  Physical exam  During the exam, your healthcare provider checks your ears, nose, and throat for problems. He or she  "also checks for swelling in the neck, and may listen to your chest.  Possible tests  Other tests your healthcare provider may perform include:    A throat swab to check for bacteria such as streptococcus (the bacteria that causes strep throat)    A blood test to check for mononucleosis (a viral infection)    A chest X-ray to rule out pneumonia, especially if you have a cough  Treating a sore throat  Treatment depends on many factors. What is the likely cause? Is the problem recent? Does it keep coming back? In many cases, the best thing to do is to treat the symptoms, rest, and let the problem heal itself. Antibiotics may help clear up some bacterial infections. For cases of severe or recurring tonsillitis, the tonsils may need to be removed.  Relieving your symptoms    Don t smoke, and avoid secondhand smoke.    For children, try throat sprays or Popsicles. Adults and older children may try lozenges.    Drink warm liquids to soothe the throat and help thin mucus. Avoid alcohol, spicy foods, and acidic drinks such as orange juice. These can irritate the throat.    Gargle with warm saltwater (1 teaspoon of salt to 8 ounces of warm water).    Use a humidifier to keep air moist and relieve throat dryness.    Try over-the-counter pain relievers such as acetaminophen or ibuprofen. Use as directed, and don t exceed the recommended dose. Don t give aspirin to children.   Are antibiotics needed?  If your sore throat is due to a bacterial infection, antibiotics may speed healing and prevent complications. Although group A streptococcus (\"strep throat\" or GAS) is the major treatable infection for a sore throat, GAS causes only 5% to 15% of sore throats in adults who seek medical care. Most sore throats are caused by cold or flu viruses. And antibiotics don t treat viral illness. In fact, using antibiotics when they re not needed may produce bacteria that are harder to kill. Your healthcare provider will prescribe antibiotics " only if he or she thinks they are likely to help.  If antibiotics are prescribed  Take the medicine exactly as directed. Be sure to finish your prescription even if you re feeling better. And be sure to ask your healthcare provider or pharmacist what side effects are common and what to do about them.  Is surgery needed?  In some cases, tonsils need to be removed. This is often done as outpatient (same-day) surgery. Your healthcare provider may advise removing the tonsils in cases of:    Several severe bouts of tonsillitis in a year.  Severe  episodes include those that lead to missed days of school or work, or that need to be treated with antibiotics.    Tonsillitis that causes breathing problems during sleep    Tonsillitis caused by food particles collecting in pouches in the tonsils (cryptic tonsillitis)  Call your healthcare provider if any of the following occur:    Symptoms worsen, or new symptoms develop.    Swollen tonsils make breathing difficult.    The pain is severe enough to keep you from drinking liquids.    A skin rash, hives, or wheezing develops. Any of these could signal an allergic reaction to antibiotics.    Symptoms don t improve within a week.    Symptoms don t improve within 2 to 3 days of starting antibiotics.   Date Last Reviewed: 10/1/2016    2758-6398 [employer]. [address] [place] [address], [place] [address]. All rights reserved. This information is not intended as a substitute for professional medical care. Always follow your healthcare professional's instructions.

## 2019-02-12 NOTE — NURSING NOTE
"Chief Complaint   Patient presents with     Pharyngitis     x2 days        Initial There were no vitals taken for this visit. Estimated body mass index is 18.95 kg/m  as calculated from the following:    Height as of 11/13/18: 1.702 m (5' 7\").    Weight as of 1/25/19: 54.9 kg (121 lb).    Patient presents to the clinic using No DME    Health Maintenance that is potentially due pending provider review:  NONE    n/a    Is there anyone who you would like to be able to receive your results? No  If yes have patient fill out JESSICA      "

## 2019-02-12 NOTE — PROGRESS NOTES
SUBJECTIVE:   Jackie Quispe is a 29 year old female who presents to clinic today for the following health issues:      ENT Symptoms             Symptoms: cc Present Absent Comment   Fever/Chills       Fatigue  x     Muscle Aches       Eye Irritation       Sneezing       Nasal Dean/Drg       Sinus Pressure/Pain       Loss of smell       Dental pain       Sore Throat  x     Swollen Glands  x     Ear Pain/Fullness  x  Behind ears are in pain    Cough  x     Wheeze       Chest Pain       Shortness of breath       Rash       Other  x  Headaches, hoarse voice      Symptom duration:  2 days   Symptom severity:  worsening    Treatments tried:  tylenol    Contacts: Exposure to strep at work,       Problem list and histories reviewed & adjusted, as indicated.  Additional history: as documented    Patient Active Problem List   Diagnosis     Smoker     Acne     Dysfunctional uterine bleeding     FIDELIA (generalized anxiety disorder)     CARDIOVASCULAR SCREENING; LDL GOAL LESS THAN 160     Mirena IUD- remove by 3/2022     Rib pain     Past Surgical History:   Procedure Laterality Date     MOUTH SURGERY      wisdom teeth       Social History     Tobacco Use     Smoking status: Current Every Day Smoker     Packs/day: 0.75     Types: Cigarettes     Smokeless tobacco: Never Used   Substance Use Topics     Alcohol use: Yes     Frequency: Monthly or less     Family History   Problem Relation Age of Onset     Alcohol/Drug Maternal Grandmother         alcohol     Hypertension Paternal Grandfather      Cancer Paternal Grandfather      Heart Disease Paternal Grandfather      Cancer Maternal Grandfather         lung     Alzheimer Disease Paternal Grandmother      Family History Negative Mother      Cardiovascular Father 54        MI     Heart Disease Father          Current Outpatient Medications   Medication Sig Dispense Refill     levonorgestrel (MIRENA) 20 MCG/24HR IUD 1 each (20 mcg) by Intrauterine route once 7/8/2016 1 each 0      sertraline (ZOLOFT) 25 MG tablet Take 1/2 tab for 1 week and then increase to full tab 30 tablet 1     doxycycline monohydrate (MONODOX) 100 MG capsule Take 1 capsule (100 mg) by mouth 2 times daily (Patient not taking: Reported on 1/25/2019) 60 capsule 1     No Known Allergies  Recent Labs   Lab Test 10/04/18  1404 09/24/18  1610 09/13/16  1750   ALT 20  --  17   CR 0.65  --  0.61   GFRESTIMATED >90  --  >90  Non  GFR Calc     GFRESTBLACK >90  --  >90  African American GFR Calc     POTASSIUM 3.6  --  3.9   TSH  --  0.21* 1.22      BP Readings from Last 3 Encounters:   02/12/19 110/70   01/25/19 120/70   01/14/19 120/70    Wt Readings from Last 3 Encounters:   02/12/19 54.4 kg (120 lb)   01/25/19 54.9 kg (121 lb)   01/14/19 57.2 kg (126 lb)                  Labs reviewed in EPIC    Reviewed and updated as needed this visit by clinical staff       Reviewed and updated as needed this visit by Provider         ROS:  Constitutional, HEENT, cardiovascular, pulmonary, gi and gu systems are negative, except as otherwise noted.    OBJECTIVE:     /70   Pulse 77   Temp 98.5  F (36.9  C) (Tympanic)   Wt 54.4 kg (120 lb)   SpO2 98%   BMI 18.79 kg/m    Body mass index is 18.79 kg/m .  GENERAL: healthy, alert and no distress  EYES: Eyes grossly normal to inspection, PERRL and conjunctivae and sclerae normal  HENT: normal cephalic/atraumatic, ear canals and TM's normal, oral mucous membranes moist, oropharxnx crowded and tonsillar erythema  NECK: no adenopathy, no asymmetry, masses, or scars and thyroid normal to palpation  RESP: lungs clear to auscultation - no rales, rhonchi or wheezes  CV: regular rates and rhythm, normal S1 S2, no S3 or S4 and no murmur, click or rub  MS: no gross musculoskeletal defects noted, no edema    Diagnostic Test Results:  Results for orders placed or performed in visit on 02/12/19 (from the past 24 hour(s))   Strep, Rapid Screen   Result Value Ref Range    Specimen  Description Throat     Rapid Strep A Screen       NEGATIVE: No Group A streptococcal antigen detected by immunoassay, await culture report.       ASSESSMENT/PLAN:       1. Recurrent tonsillitis  -Suspect symptoms secondary to acute tonsillitis.  Rapid strep negative.  Suggested conservative management.  Patient would like to try antibiotic as has worked well in the past, risk and benefits explained in detail.  Suggested to follow-up with ENT considering recurrent episodes.  Recommended to continue well hydration, warm fluids, over-the-counter analgesia and saline gargles  - amoxicillin-clavulanate (AUGMENTIN) 875-125 MG tablet; Take 1 tablet by mouth 2 times daily for 10 days  Dispense: 20 tablet; Refill: 0  - OTOLARYNGOLOGY REFERRAL      2. Sore throat  - Strep, Rapid Screen  - Beta strep group A culture      Patient Instructions       Patient Education     When You Have a Sore Throat    A sore throat can be painful. There are many reasons why you may have a sore throat. Your healthcare provider will work with you to find the cause of your sore throat. He or she will also find the best treatment for you.  What causes a sore throat?  Sore throats can be caused or worsened by:    Cold or flu viruses    Bacteria    Irritants such as tobacco smoke or air pollution    Acid reflux  A healthy throat  The tonsils are on the sides of the throat near the base of the tongue. They collect viruses and bacteria and help fight infection. The throat (pharynx) is the passage for air. Mucus from the nasal cavity also moves down the passage.  An inflamed throat  The tonsils and pharynx can become inflamed due to a cold or flu virus. Postnasal drip (excess mucus draining from the nasal cavity) can irritate the throat. It can also make the throat or tonsils more likely to be infected by bacteria. Severe, untreated tonsillitis in children or adults can cause a pocket of pus (abscess) to form near the tonsil.  Your evaluation  A medical  evaluation can help find the cause of your sore throat. It can also help your healthcare provider choose the best treatment for you. The evaluation may include a health history, physical exam, and diagnostic tests.  Health history  Your healthcare provider may ask you the following:    How long has the sore throat lasted and how have you been treating it?    Do you have any other symptoms, such as body aches, fever, or cough?    Does your sore throat recur? If so, how often? How many days of school or work have you missed because of a sore throat?    Do you have trouble eating or swallowing?    Have you been told that you snore or have other sleep problems?    Do you have bad breath?    Do you cough up bad-tasting mucus?  Physical exam  During the exam, your healthcare provider checks your ears, nose, and throat for problems. He or she also checks for swelling in the neck, and may listen to your chest.  Possible tests  Other tests your healthcare provider may perform include:    A throat swab to check for bacteria such as streptococcus (the bacteria that causes strep throat)    A blood test to check for mononucleosis (a viral infection)    A chest X-ray to rule out pneumonia, especially if you have a cough  Treating a sore throat  Treatment depends on many factors. What is the likely cause? Is the problem recent? Does it keep coming back? In many cases, the best thing to do is to treat the symptoms, rest, and let the problem heal itself. Antibiotics may help clear up some bacterial infections. For cases of severe or recurring tonsillitis, the tonsils may need to be removed.  Relieving your symptoms    Don t smoke, and avoid secondhand smoke.    For children, try throat sprays or Popsicles. Adults and older children may try lozenges.    Drink warm liquids to soothe the throat and help thin mucus. Avoid alcohol, spicy foods, and acidic drinks such as orange juice. These can irritate the throat.    Gargle with warm  "saltwater (1 teaspoon of salt to 8 ounces of warm water).    Use a humidifier to keep air moist and relieve throat dryness.    Try over-the-counter pain relievers such as acetaminophen or ibuprofen. Use as directed, and don t exceed the recommended dose. Don t give aspirin to children.   Are antibiotics needed?  If your sore throat is due to a bacterial infection, antibiotics may speed healing and prevent complications. Although group A streptococcus (\"strep throat\" or GAS) is the major treatable infection for a sore throat, GAS causes only 5% to 15% of sore throats in adults who seek medical care. Most sore throats are caused by cold or flu viruses. And antibiotics don t treat viral illness. In fact, using antibiotics when they re not needed may produce bacteria that are harder to kill. Your healthcare provider will prescribe antibiotics only if he or she thinks they are likely to help.  If antibiotics are prescribed  Take the medicine exactly as directed. Be sure to finish your prescription even if you re feeling better. And be sure to ask your healthcare provider or pharmacist what side effects are common and what to do about them.  Is surgery needed?  In some cases, tonsils need to be removed. This is often done as outpatient (same-day) surgery. Your healthcare provider may advise removing the tonsils in cases of:    Several severe bouts of tonsillitis in a year.  Severe  episodes include those that lead to missed days of school or work, or that need to be treated with antibiotics.    Tonsillitis that causes breathing problems during sleep    Tonsillitis caused by food particles collecting in pouches in the tonsils (cryptic tonsillitis)  Call your healthcare provider if any of the following occur:    Symptoms worsen, or new symptoms develop.    Swollen tonsils make breathing difficult.    The pain is severe enough to keep you from drinking liquids.    A skin rash, hives, or wheezing develops. Any of these could " signal an allergic reaction to antibiotics.    Symptoms don t improve within a week.    Symptoms don t improve within 2 to 3 days of starting antibiotics.   Date Last Reviewed: 10/1/2016    0295-4986 [employer]. [address] [place] [address], [place] [address]. All rights reserved. This information is not intended as a substitute for professional medical care. Always follow your healthcare professional's instructions.               Tru Savage MD  [facility]

## 2019-02-12 NOTE — LETTER
February 12, 2019      Jackie Jose Enrique  [address] [place] [address] [place] [id number]        To Whom It May Concern:      Jackie Quispe was seen in our clinic. Kindly excuse her work absence today.         Sincerely,          Tru Savage MD

## 2019-02-13 LAB
BACTERIA SPEC CULT: NORMAL
SPECIMEN SOURCE: NORMAL

## 2019-02-25 ENCOUNTER — TELEPHONE (OUTPATIENT)
Dept: FAMILY MEDICINE | Facility: CLINIC | Age: 29
End: 2019-02-25

## 2019-02-25 NOTE — TELEPHONE ENCOUNTER
Patient was told to return for fasting labs, reminder letter was sent to patient on 01/18/2019, patient has still not scheduled an appointment.

## 2019-02-27 ENCOUNTER — TELEPHONE (OUTPATIENT)
Dept: OBGYN | Facility: CLINIC | Age: 29
End: 2019-02-27

## 2019-02-27 NOTE — TELEPHONE ENCOUNTER
Reason for call:  Patient reporting a symptom    Symptom or request: Has Mirena IUD - states her period hasn't been regular.  Is late - last period the beginning of Jan.  Sore breasts, yeast infection - only has these sx when she is pregnant.  Partner told her no longer feels her Mirena and he always have.  Having a lot of discharge.  Took two pregnancy tests - both negative.  Has ENT appt coming up and does not want to pursuit this if she might be pregnant since she won't be able to have surg    Duration (how long have symptoms been present):     Have you been treated for this before? No    Additional comments:     Phone Number patient can be reached at:  Home number on file [phone] (home)    Best Time:  If possible to call at 10 - that is when she is on a break.    Can we leave a detailed message on this number:  YES    Call taken on 2/27/2019 at 8:44 AM by Carmita Soria

## 2019-02-27 NOTE — TELEPHONE ENCOUNTER
Return call to pt.  Unable to reach.  Left message for pt to return call to clinic.    Gwendolyn Celaya   [facility]  RN

## 2019-02-27 NOTE — TELEPHONE ENCOUNTER
Patient has appointment 3/7/19 for IUD evaluation. Added to waitlist per patient's request. Patient will abstain from ETOH, and OTC's other than Tylenol or other pregnancy safe medications until appointment.     Bonilla LOPEZ RN   [facility]

## 2019-03-07 ENCOUNTER — OFFICE VISIT (OUTPATIENT)
Dept: OBGYN | Facility: CLINIC | Age: 29
End: 2019-03-07
Payer: COMMERCIAL

## 2019-03-07 VITALS
HEIGHT: 68 IN | DIASTOLIC BLOOD PRESSURE: 80 MMHG | TEMPERATURE: 97.9 F | HEART RATE: 101 BPM | WEIGHT: 119.9 LBS | SYSTOLIC BLOOD PRESSURE: 118 MMHG | RESPIRATION RATE: 16 BRPM | BODY MASS INDEX: 18.17 KG/M2

## 2019-03-07 DIAGNOSIS — Z32.00 POSSIBLE PREGNANCY, NOT CONFIRMED: Primary | ICD-10-CM

## 2019-03-07 LAB — HCG UR QL: NEGATIVE

## 2019-03-07 PROCEDURE — 99213 OFFICE O/P EST LOW 20 MIN: CPT | Performed by: OBSTETRICS & GYNECOLOGY

## 2019-03-07 PROCEDURE — 81025 URINE PREGNANCY TEST: CPT | Performed by: OBSTETRICS & GYNECOLOGY

## 2019-03-07 ASSESSMENT — MIFFLIN-ST. JEOR: SCORE: 1312.36

## 2019-03-07 NOTE — PROGRESS NOTES
"  SUBJECTIVE:                                                   CC:  Patient presents with:  Consult      HPI:  Jackie Quispe is a 29 year old  who presents with concern for pregnancy.  She has a Mirena IUD in place and last week noted breast tenderness, nausea, and fatigue.  She had two negative UPTs at [facility].  Had some bleeding yesterday, probably a period.  Wants to make sure she's not pregnant and also to confirm IUD placement.     ROS: 10 point ROS negative other than as listed above in HPI.    Gyn History:  No LMP recorded. Patient is not currently having periods (Reason: IUD).     Patient is sexually active.  Using IUD for contraception.   Recent pap smears:    Lab Results   Component Value Date    PAP NIL 01/10/2018    PAP NIL 2014    PAP NIL 2012        Last 3 Pap and HPV Results:   PAP / HPV 1/10/2018 11/3/2014 2012   PAP NIL NIL NIL         PMH, PSH, Soc Hx, Fam Hx, Meds, and allergies reviewed in Epic.    OBJECTIVE:     /80 (BP Location: Left arm, Patient Position: Sitting, Cuff Size: Adult Regular)   Pulse 101   Temp 97.9  F (36.6  C) (Tympanic)   Resp 16   Ht 1.727 m (5' 8\")   Wt 54.4 kg (119 lb 14.4 oz)   Breastfeeding? No   BMI 18.23 kg/m      Gen: Healthy appearing thin female, no acute distress, comfortable  HENT: No scleral injection or icterus  CV: Regular rate  Resp: Normal work of breathing, no cough  GI: Abdomen soft, non-tender. No masses, organomegaly  Skin: No suspicious lesions or rashes  Psychiatric: mentation appears normal and affect bright    : Normal external female genitalia.  No external lesions, normal hair distribution.   SSE: Speculum exam reveals vaginal epithelium well rugated with normal physiologic discharge. Cervix appears smooth, pink, with no visible lesions.  IUD strings visualized approx 2cm long.   Bimanual exam reveals normal size uterus, non-tender, and mobile. No adnexal masses or tenderness. No cervical motion " tenderness.     Test Results:  Results for orders placed or performed in visit on 03/07/19 (from the past 24 hour(s))   HCG Qual, Urine ([id number])   Result Value Ref Range    HCG Qual Urine Negative NEG^Negative       ASSESSMENT/PLAN:                                                      1. Possible pregnancy, not confirmed  Discussed that the IUD appears to be in place and the HCG is negative.  Discussed that approx 50% of people will nto ovulate on the IUD, but that she may have not ovulated before but now is starting to ovulate again, and this may explain some of her symptoms, and if that's the case, it may continue to occur on a monthly basis.  Discussed getting UPT if ever thinks she is pregnant because pregnancies with IUD in place have a higher risk of being ectopic.  All questions answered.   - HCG Qual, Urine ([id number])      Alejandrina Jaimes MD, MPH  Obstetrics and Gynecology

## 2019-03-07 NOTE — NURSING NOTE
"Initial /80 (BP Location: Left arm, Patient Position: Sitting, Cuff Size: Adult Regular)   Pulse 101   Temp 97.9  F (36.6  C) (Tympanic)   Resp 16   Ht 1.727 m (5' 8\")   Wt 54.4 kg (119 lb 14.4 oz)   Breastfeeding? No   BMI 18.23 kg/m   Estimated body mass index is 18.23 kg/m  as calculated from the following:    Height as of this encounter: 1.727 m (5' 8\").    Weight as of this encounter: 54.4 kg (119 lb 14.4 oz). .    Jackie Zaragoza, KIA    "

## 2019-03-18 ENCOUNTER — VIRTUAL VISIT (OUTPATIENT)
Dept: FAMILY MEDICINE | Facility: CLINIC | Age: 29
End: 2019-03-18
Payer: COMMERCIAL

## 2019-03-18 DIAGNOSIS — F41.1 GAD (GENERALIZED ANXIETY DISORDER): ICD-10-CM

## 2019-03-18 PROCEDURE — 99441 ZZC PHYSICIAN TELEPHONE EVALUATION 5-10 MIN: CPT | Performed by: NURSE PRACTITIONER

## 2019-03-18 RX ORDER — SERTRALINE HYDROCHLORIDE 25 MG/1
TABLET, FILM COATED ORAL
Qty: 90 TABLET | Refills: 3 | Status: SHIPPED | OUTPATIENT
Start: 2019-03-18 | End: 2019-06-18

## 2019-03-18 ASSESSMENT — PATIENT HEALTH QUESTIONNAIRE - PHQ9
5. POOR APPETITE OR OVEREATING: SEVERAL DAYS
SUM OF ALL RESPONSES TO PHQ QUESTIONS 1-9: 1

## 2019-03-18 ASSESSMENT — ANXIETY QUESTIONNAIRES
5. BEING SO RESTLESS THAT IT IS HARD TO SIT STILL: NOT AT ALL
3. WORRYING TOO MUCH ABOUT DIFFERENT THINGS: NOT AT ALL
IF YOU CHECKED OFF ANY PROBLEMS ON THIS QUESTIONNAIRE, HOW DIFFICULT HAVE THESE PROBLEMS MADE IT FOR YOU TO DO YOUR WORK, TAKE CARE OF THINGS AT HOME, OR GET ALONG WITH OTHER PEOPLE: NOT DIFFICULT AT ALL
7. FEELING AFRAID AS IF SOMETHING AWFUL MIGHT HAPPEN: NOT AT ALL
6. BECOMING EASILY ANNOYED OR IRRITABLE: NOT AT ALL
2. NOT BEING ABLE TO STOP OR CONTROL WORRYING: NOT AT ALL
GAD7 TOTAL SCORE: 2
1. FEELING NERVOUS, ANXIOUS, OR ON EDGE: SEVERAL DAYS

## 2019-03-18 NOTE — PROGRESS NOTES
"Jackie Quispe is a 29 year old female who is being evaluated via a billable telephone visit.      The patient has been notified of following:     \"This telephone visit will be conducted via a call between you and your physician/provider. We have found that certain health care needs can be provided without the need for a physical exam.  This service lets us provide the care you need with a short phone conversation.  If a prescription is necessary we can send it directly to your pharmacy.  If lab work is needed we can place an order for that and you can then stop by our lab to have the test done at a later time.    If during the course of the call the physician/provider feels a telephone visit is not appropriate, you will not be charged for this service.\"     Consent has been obtained for this service by 1 care team member: yes. See the scanned image in the medical record.    Jackie Quispe complains of    Chief Complaint   Patient presents with     Anxiety     follow up Televisit     Feels that the anxiety has been better, having headaches  Headaches might be from ENT      I have reviewed and updated the patient's Past Medical History, Social History, Family History and Medication List.      PHQ-9 SCORE 12/18/2017 1/14/2019 3/18/2019   PHQ-9 Total Score - - -   PHQ-9 Total Score MyChart - 11 (Moderate depression) -   PHQ-9 Total Score 4 11 1     FIDELIA-7 SCORE 12/18/2017 1/14/2019 3/18/2019   Total Score - 15 (severe anxiety) -   Total Score 2 15 2       ALLERGIES  Patient has no known allergies.    MM (MA signature)      Additional provider notes: has noted huge differences feels much more level- less irritable, joyful   Some headaches- thinks may be more ENT related- has an appointment tomorrow   Feeling fatigued will try taking zoloft at  and see if this helps       Assessment/Plan:  (F41.1) FIDELIA (generalized anxiety disorder)  Comment: much improved   Plan: sertraline (ZOLOFT) 25 MG tablet              I have " reviewed the note as documented above.  This accurately captures the substance of my conversation with the patient.      Total time of call between patient and provider was 5 minutes     Ernestine Campos NP

## 2019-03-19 ENCOUNTER — OFFICE VISIT (OUTPATIENT)
Dept: OTOLARYNGOLOGY | Facility: CLINIC | Age: 29
End: 2019-03-19
Payer: COMMERCIAL

## 2019-03-19 VITALS
HEIGHT: 68 IN | HEART RATE: 94 BPM | DIASTOLIC BLOOD PRESSURE: 70 MMHG | WEIGHT: 119 LBS | SYSTOLIC BLOOD PRESSURE: 111 MMHG | BODY MASS INDEX: 18.04 KG/M2 | TEMPERATURE: 97.7 F

## 2019-03-19 DIAGNOSIS — J03.01 ACUTE RECURRENT STREPTOCOCCAL TONSILLITIS: Primary | ICD-10-CM

## 2019-03-19 PROCEDURE — 99203 OFFICE O/P NEW LOW 30 MIN: CPT | Performed by: OTOLARYNGOLOGY

## 2019-03-19 ASSESSMENT — MIFFLIN-ST. JEOR: SCORE: 1308.28

## 2019-03-19 ASSESSMENT — ANXIETY QUESTIONNAIRES: GAD7 TOTAL SCORE: 2

## 2019-03-19 NOTE — NURSING NOTE
"Initial /70 (BP Location: Left arm, Patient Position: Sitting, Cuff Size: Adult Regular)   Pulse 94   Temp 97.7  F (36.5  C) (Oral)   Ht 1.727 m (5' 8\")   Wt 54 kg (119 lb)   BMI 18.09 kg/m   Estimated body mass index is 18.09 kg/m  as calculated from the following:    Height as of this encounter: 1.727 m (5' 8\").    Weight as of this encounter: 54 kg (119 lb). .    Aishwarya Aggarwal CMA    "

## 2019-03-19 NOTE — PROGRESS NOTES
History of Present Illness - Jackie Quispe is a 29 year old female who presents in consultation at the request of Dr. Savage for recurrent tonsillitis. She has recurrent sore throats, and most recently had a 2 day episode with negative strep testing but requested antibiotic therapy according to record review. She describes frequent sore throats that are causing her to miss an unacceptable amount of work. She seems to have frequent pain in the upper neck bilaterally. She typically does not have associated cough or nasal congestion symptoms during the episodes. The episodes typically last around 5 days.    Past Medical History -   Patient Active Problem List   Diagnosis     Smoker     Acne     Dysfunctional uterine bleeding     FIDELIA (generalized anxiety disorder)     CARDIOVASCULAR SCREENING; LDL GOAL LESS THAN 160     Mirena IUD- remove by 3/2022     Rib pain       Current Medications -   Current Outpatient Medications:      levonorgestrel (MIRENA) 20 MCG/24HR IUD, 1 each (20 mcg) by Intrauterine route once 7/8/2016, Disp: 1 each, Rfl: 0     sertraline (ZOLOFT) 25 MG tablet, 1 tablet Daily, Disp: 90 tablet, Rfl: 3    Allergies - No Known Allergies    Social History -   Social History     Socioeconomic History     Marital status: Single     Spouse name: Not on file     Number of children: 2     Years of education: Not on file     Highest education level: Not on file   Occupational History     Not on file   Social Needs     Financial resource strain: Not on file     Food insecurity:     Worry: Not on file     Inability: Not on file     Transportation needs:     Medical: Not on file     Non-medical: Not on file   Tobacco Use     Smoking status: Current Every Day Smoker     Packs/day: 0.75     Types: Cigarettes     Smokeless tobacco: Never Used   Substance and Sexual Activity     Alcohol use: Yes     Frequency: Monthly or less     Drug use: No     Sexual activity: Yes     Partners: Male     Birth  control/protection: IUD   Lifestyle     Physical activity:     Days per week: Not on file     Minutes per session: Not on file     Stress: Not on file   Relationships     Social connections:     Talks on phone: Not on file     Gets together: Not on file     Attends Shinto service: Not on file     Active member of club or organization: Not on file     Attends meetings of clubs or organizations: Not on file     Relationship status: Not on file     Intimate partner violence:     Fear of current or ex partner: Not on file     Emotionally abused: Not on file     Physically abused: Not on file     Forced sexual activity: Not on file   Other Topics Concern     Parent/sibling w/ CABG, MI or angioplasty before 65F 55M? No   Social History Narrative     Not on file       Family History -   Family History   Problem Relation Age of Onset     Alcohol/Drug Maternal Grandmother         alcohol     Hypertension Paternal Grandfather      Cancer Paternal Grandfather      Heart Disease Paternal Grandfather      Cancer Maternal Grandfather         lung     Alzheimer Disease Paternal Grandmother      Family History Negative Mother      Cardiovascular Father 54        MI     Heart Disease Father        Review of Systems - As per HPI and PMHx, otherwise 7 system review of the head and neck negative. 10+ system review negative.    Physical Exam  There were no vitals taken for this visit.  General - The patient is well nourished and well developed, and appears to have good nutritional status.  Alert and oriented to person and place, answers questions and cooperates with examination appropriately.   Head and Face - Normocephalic and atraumatic, with no gross asymmetry noted of the contour of the facial features.  The facial nerve is intact, with strong symmetric movements.  Voice and Breathing - The patient was breathing comfortably without the use of accessory muscles. There was no wheezing, stridor, or stertor.  The patients voice was  clear and strong, and had appropriate pitch and quality.  Ears - Bilateral pinna and EACs with normal appearing overlying skin. Tympanic membrane intact with good mobility on pneumatic otoscopy bilaterally. Bony landmarks of the ossicular chain are normal. The tympanic membranes are normal in appearance. No retraction, perforation, or masses.  No fluid or purulence was seen in the external canal or the middle ear.   Eyes - Extraocular movements intact.  Sclera were not icteric or injected, conjunctiva were pink and moist.  Mouth - Examination of the oral cavity showed pink, healthy oral mucosa. No lesions or ulcerations noted.  The tongue was mobile and midline, and the dentition were in good condition.    Throat - The walls of the oropharynx were smooth, pink, moist, symmetric, and had no lesions or ulcerations.  The tonsillar pillars and soft palate were symmetric.  The uvula was midline on elevation. Tonsils 1+, not inflamed today.  Neck - Normal midline excursion of the laryngotracheal complex during swallowing.  Full range of motion on passive movement.  Palpation of the occipital, submental, submandibular, internal jugular chain, and supraclavicular nodes did not demonstrate any abnormal lymph nodes or masses.  The carotid pulse was palpable bilaterally.  Palpation of the thyroid was soft and smooth, with no nodules or goiter appreciated.  The trachea was mobile and midline.  Nose - External contour is symmetric, no gross deflection or scars.  Nasal mucosa is pink and moist with no abnormal mucus.  The septum was midline and non-obstructive, turbinates of normal size and position.  No polyps, masses, or purulence noted on examination.  Heart:  Regular rate and rhythm, no murmurs.  Lungs:  Chest clear to auscultation bilaterally          Assessment - Jackie Quispe is a 29 year old female with recurrent tonsillitis. She typically does not test postive for strep, so it is generally assumed that these result  from viral illnesses. However, her severity of symptoms resulting in time off of work is clearly disruptive to her life. I discussed the option of tonsillectomy with her today, including the expected time off of work for recovery, as well as the limited evidence that tonsillectomy can reduce the number or severity of her sore throats. she wished to consider the option and I advised her to simply call in to arrange for surgery scheduling if she would be interested in proceeding.       Dr. Guy Hendrickson MD  Otolaryngology  [facility]

## 2019-03-19 NOTE — LETTER
3/19/2019         RE: Jackie Quispe  [address] [place] [id number]        Dear Colleague,    Thank you for referring your patient, Jackie Quispe, to the [facility]. Please see a copy of my visit note below.        History of Present Illness - Jackie Quispe is a 29 year old female who presents in consultation at the request of Dr. Savage for recurrent tonsillitis. She has recurrent sore throats, and most recently had a 2 day episode with negative strep testing but requested antibiotic therapy according to record review. She describes frequent sore throats that are causing her to miss an unacceptable amount of work. She seems to have frequent pain in the upper neck bilaterally. She typically does not have associated cough or nasal congestion symptoms during the episodes. The episodes typically last around 5 days.    Past Medical History -   Patient Active Problem List   Diagnosis     Smoker     Acne     Dysfunctional uterine bleeding     FIDELIA (generalized anxiety disorder)     CARDIOVASCULAR SCREENING; LDL GOAL LESS THAN 160     Mirena IUD- remove by 3/2022     Rib pain       Current Medications -   Current Outpatient Medications:      levonorgestrel (MIRENA) 20 MCG/24HR IUD, 1 each (20 mcg) by Intrauterine route once 7/8/2016, Disp: 1 each, Rfl: 0     sertraline (ZOLOFT) 25 MG tablet, 1 tablet Daily, Disp: 90 tablet, Rfl: 3    Allergies - No Known Allergies    Social History -   Social History     Socioeconomic History     Marital status: Single     Spouse name: Not on file     Number of children: 2     Years of education: Not on file     Highest education level: Not on file   Occupational History     Not on file   Social Needs     Financial resource strain: Not on file     Food insecurity:     Worry: Not on file     Inability: Not on file     Transportation needs:     Medical: Not on file     Non-medical: Not on file   Tobacco Use     Smoking status: Current Every Day  Smoker     Packs/day: 0.75     Types: Cigarettes     Smokeless tobacco: Never Used   Substance and Sexual Activity     Alcohol use: Yes     Frequency: Monthly or less     Drug use: No     Sexual activity: Yes     Partners: Male     Birth control/protection: IUD   Lifestyle     Physical activity:     Days per week: Not on file     Minutes per session: Not on file     Stress: Not on file   Relationships     Social connections:     Talks on phone: Not on file     Gets together: Not on file     Attends Congregational service: Not on file     Active member of club or organization: Not on file     Attends meetings of clubs or organizations: Not on file     Relationship status: Not on file     Intimate partner violence:     Fear of current or ex partner: Not on file     Emotionally abused: Not on file     Physically abused: Not on file     Forced sexual activity: Not on file   Other Topics Concern     Parent/sibling w/ CABG, MI or angioplasty before 65F 55M? No   Social History Narrative     Not on file       Family History -   Family History   Problem Relation Age of Onset     Alcohol/Drug Maternal Grandmother         alcohol     Hypertension Paternal Grandfather      Cancer Paternal Grandfather      Heart Disease Paternal Grandfather      Cancer Maternal Grandfather         lung     Alzheimer Disease Paternal Grandmother      Family History Negative Mother      Cardiovascular Father 54        MI     Heart Disease Father        Review of Systems - As per HPI and PMHx, otherwise 7 system review of the head and neck negative. 10+ system review negative.    Physical Exam  There were no vitals taken for this visit.  General - The patient is well nourished and well developed, and appears to have good nutritional status.  Alert and oriented to person and place, answers questions and cooperates with examination appropriately.   Head and Face - Normocephalic and atraumatic, with no gross asymmetry noted of the contour of the facial  features.  The facial nerve is intact, with strong symmetric movements.  Voice and Breathing - The patient was breathing comfortably without the use of accessory muscles. There was no wheezing, stridor, or stertor.  The patients voice was clear and strong, and had appropriate pitch and quality.  Ears - Bilateral pinna and EACs with normal appearing overlying skin. Tympanic membrane intact with good mobility on pneumatic otoscopy bilaterally. Bony landmarks of the ossicular chain are normal. The tympanic membranes are normal in appearance. No retraction, perforation, or masses.  No fluid or purulence was seen in the external canal or the middle ear.   Eyes - Extraocular movements intact.  Sclera were not icteric or injected, conjunctiva were pink and moist.  Mouth - Examination of the oral cavity showed pink, healthy oral mucosa. No lesions or ulcerations noted.  The tongue was mobile and midline, and the dentition were in good condition.    Throat - The walls of the oropharynx were smooth, pink, moist, symmetric, and had no lesions or ulcerations.  The tonsillar pillars and soft palate were symmetric.  The uvula was midline on elevation. Tonsils 1+, not inflamed today.  Neck - Normal midline excursion of the laryngotracheal complex during swallowing.  Full range of motion on passive movement.  Palpation of the occipital, submental, submandibular, internal jugular chain, and supraclavicular nodes did not demonstrate any abnormal lymph nodes or masses.  The carotid pulse was palpable bilaterally.  Palpation of the thyroid was soft and smooth, with no nodules or goiter appreciated.  The trachea was mobile and midline.  Nose - External contour is symmetric, no gross deflection or scars.  Nasal mucosa is pink and moist with no abnormal mucus.  The septum was midline and non-obstructive, turbinates of normal size and position.  No polyps, masses, or purulence noted on examination.  Heart:  Regular rate and rhythm, no  murmurs.  Lungs:  Chest clear to auscultation bilaterally          Assessment - Jackie Quispe is a 29 year old female with recurrent tonsillitis. She typically does not test postive for strep, so it is generally assumed that these result from viral illnesses. However, her severity of symptoms resulting in time off of work is clearly disruptive to her life. I discussed the option of tonsillectomy with her today, including the expected time off of work for recovery, as well as the limited evidence that tonsillectomy can reduce the number or severity of her sore throats. she wished to consider the option and I advised her to simply call in to arrange for surgery scheduling if she would be interested in proceeding.       Dr. Guy Hendrickson MD  Otolaryngology  [facility]        Again, thank you for allowing me to participate in the care of your patient.        Sincerely,        Guy Hendrickson MD

## 2019-05-29 ENCOUNTER — OFFICE VISIT (OUTPATIENT)
Dept: FAMILY MEDICINE | Facility: CLINIC | Age: 29
End: 2019-05-29
Payer: COMMERCIAL

## 2019-05-29 VITALS
SYSTOLIC BLOOD PRESSURE: 124 MMHG | WEIGHT: 122 LBS | HEART RATE: 88 BPM | TEMPERATURE: 98.7 F | DIASTOLIC BLOOD PRESSURE: 60 MMHG | HEIGHT: 68 IN | BODY MASS INDEX: 18.49 KG/M2 | OXYGEN SATURATION: 98 % | RESPIRATION RATE: 16 BRPM

## 2019-05-29 DIAGNOSIS — N63.0 LUMP OR MASS IN BREAST: Primary | ICD-10-CM

## 2019-05-29 PROBLEM — F41.1 GAD (GENERALIZED ANXIETY DISORDER): Status: ACTIVE | Noted: 2017-11-06

## 2019-05-29 PROBLEM — R07.81 RIB PAIN: Status: RESOLVED | Noted: 2018-05-30 | Resolved: 2019-05-29

## 2019-05-29 PROBLEM — F41.1 GAD (GENERALIZED ANXIETY DISORDER): Chronic | Status: ACTIVE | Noted: 2017-11-06

## 2019-05-29 PROCEDURE — 99214 OFFICE O/P EST MOD 30 MIN: CPT | Performed by: NURSE PRACTITIONER

## 2019-05-29 ASSESSMENT — MIFFLIN-ST. JEOR: SCORE: 1321.89

## 2019-05-29 NOTE — PROGRESS NOTES
"    SUBJECTIVE   Jackie Quispe is a  female who presents to clinic today for the following health issue(s):       Breast Lump       Duration: 2 weeks     Description (location/character/radiation): Rt breast at 9:00     Intensity:  mild    Accompanying signs and symptoms: None     History (similar episodes/previous evaluation): None    Precipitating or alleviating factors: None    Therapies tried and outcome: None     Mother has fibrodense breast tissue  FMHX: Paternal great aunt had breast CA  Smoker    PCP   Ernestine Campos -[phone]    Health Maintenance        Health Maintenance Due   Topic Date Due     SPIROMETRY  1990     COPD ACTION PLAN  1989     PREVENTIVE CARE VISIT  01/10/2019       HPI        Patient Active Problem List   Diagnosis     Smoker     Acne     FIDELIA (generalized anxiety disorder)     CARDIOVASCULAR SCREENING; LDL GOAL LESS THAN 160     Mirena IUD- remove by 3/2022     Current Outpatient Medications   Medication     levonorgestrel (MIRENA) 20 MCG/24HR IUD     sertraline (ZOLOFT) 25 MG tablet     No current facility-administered medications for this visit.        Reviewed and updated as needed this visit by Provider:  Tobacco  Allergies  Meds  Med Hx  Surg Hx  Fam Hx  Soc Hx     ROS:  Constitutional, HEENT, cardiovascular, pulmonary, gi and gu systems are negative, except as otherwise noted.    PHYSICAL EXAM   /60 (BP Location: Right arm, Patient Position: Sitting, Cuff Size: Adult Regular)   Pulse 88   Temp 98.7  F (37.1  C) (Tympanic)   Resp 16   Ht 1.727 m (5' 8\")   Wt 55.3 kg (122 lb)   LMP 05/01/2019   SpO2 98%   BMI 18.55 kg/m    Body mass index is 18.55 kg/m .  GENERAL APPEARANCE: healthy, alert and no distress  RESP: lungs clear to auscultation - no rales, rhonchi or wheezes  BREAST: normal without masses, tenderness or nipple discharge, no palpable axillary masses or adenopathy and mass right breast 10 o'clock 1 cm mobile mass  CV: regular rates " and rhythm, normal S1 S2, no S3 or S4 and no murmur, click or rub  MS: extremities normal- no gross deformities noted  PSYCH: mentation appears normal and affect normal/bright    ASSESSMENT & PLAN     1. Lump or mass in breast  Acute, stable  - MA Diagnostic Digital Right; Future  - US Breast Right Complete 4 Quadrants; Future    Know your gay jaden for phone      Patient Education     Breast Lump, Uncertain Cause    A lump was found in your breast. Most breast lumps are not cancer. They may be caused by normal changes in the breast tissue due to hormone variations that occur with your menstrual cycle. Some women may form lumps that are painful and tender. Others may form lumps that are painless.  At this time, is not possible to be certain of the cause of your lump without further evaluation. This could include:    Another exam by your healthcare provider or a gynecologist    Imaging tests, such as a mammogram or ultrasound    Biopsy (procedure to remove small tissue samples from the breast lump)  Your healthcare provider will explain any additional testing that is needed. Be sure to get answers to any questions you may have.  Home care  Until a diagnosis is made, you may be advised to do the following:    If you are having breast pain:  ? Take an over-the-counter pain reliever, if directed to by your provider.  ? Wear a well-fitted bra or sports bra for extra support. If you have breast pain at night, try wearing the bra during sleep.  ? Apply a warm compress (towel soaked in warm water) to the breast. You may also use a hot water bottle.    Check your breasts each day. Keep a log of whether the lump seems to be changing in size or tenderness with your period. This can help your healthcare provider make the correct diagnosis.  Follow-up care  Follow up with your healthcare provider, or as directed. Keep all appointments. Also, prepare for any upcoming tests as directed.  When to seek medical advice  Call your  healthcare provider right away if any of these occur:    Fever of 100.4 F (38 C) or higher    Redness or swelling of the breast    Discharge from the nipple    Visible changes in the skin over the nipple or breast    Lump grows larger, feels very hard, or has an irregular shape    New lumps form  Date Last Reviewed: 3/1/2017    3355-1470 [employer]. [address] [place] [address]. All rights reserved. This information is not intended as a substitute for professional medical care. Always follow your healthcare professional's instructions.             Risks, benefits, side effects and rationale for treatment plan fully discussed with the patient and understanding expressed.    ROBERT Mccarty-BC  [facility]

## 2019-05-29 NOTE — PATIENT INSTRUCTIONS
1. Lump or mass in breast  Acute, stable  - MA Diagnostic Digital Right; Future  - US Breast Right Complete 4 Quadrants; Future    Know your [employer] jaden for phone      Patient Education     Breast Lump, Uncertain Cause    A lump was found in your breast. Most breast lumps are not cancer. They may be caused by normal changes in the breast tissue due to hormone variations that occur with your menstrual cycle. Some women may form lumps that are painful and tender. Others may form lumps that are painless.  At this time, is not possible to be certain of the cause of your lump without further evaluation. This could include:    Another exam by your healthcare provider or a gynecologist    Imaging tests, such as a mammogram or ultrasound    Biopsy (procedure to remove small tissue samples from the breast lump)  Your healthcare provider will explain any additional testing that is needed. Be sure to get answers to any questions you may have.  Home care  Until a diagnosis is made, you may be advised to do the following:    If you are having breast pain:  ? Take an over-the-counter pain reliever, if directed to by your provider.  ? Wear a well-fitted bra or sports bra for extra support. If you have breast pain at night, try wearing the bra during sleep.  ? Apply a warm compress (towel soaked in warm water) to the breast. You may also use a hot water bottle.    Check your breasts each day. Keep a log of whether the lump seems to be changing in size or tenderness with your period. This can help your healthcare provider make the correct diagnosis.  Follow-up care  Follow up with your healthcare provider, or as directed. Keep all appointments. Also, prepare for any upcoming tests as directed.  When to seek medical advice  Call your healthcare provider right away if any of these occur:    Fever of 100.4 F (38 C) or higher    Redness or swelling of the breast    Discharge from the nipple    Visible changes in the skin over the nipple  or breast    Lump grows larger, feels very hard, or has an irregular shape    New lumps form  Date Last Reviewed: 3/1/2017    8375-8099 [employer]. [address] [place] [address], [place] [address]. All rights reserved. This information is not intended as a substitute for professional medical care. Always follow your healthcare professional's instructions.

## 2019-05-29 NOTE — NURSING NOTE
"Chief Complaint   Patient presents with     Breast Mass       Initial /60 (BP Location: Right arm, Patient Position: Sitting, Cuff Size: Adult Regular)   Pulse 88   Temp 98.7  F (37.1  C) (Tympanic)   Resp 16   Ht 1.727 m (5' 8\")   Wt 55.3 kg (122 lb)   LMP 05/01/2019   SpO2 98%   BMI 18.55 kg/m   Estimated body mass index is 18.55 kg/m  as calculated from the following:    Height as of this encounter: 1.727 m (5' 8\").    Weight as of this encounter: 55.3 kg (122 lb).    Patient presents to the clinic using No DME    Health Maintenance that is potentially due pending provider review:  NONE    n/a    Is there anyone who you would like to be able to receive your results? No  If yes have patient fill out JESSICA    "

## 2019-06-12 ENCOUNTER — HOSPITAL ENCOUNTER (OUTPATIENT)
Dept: ULTRASOUND IMAGING | Facility: CLINIC | Age: 29
End: 2019-06-12
Attending: NURSE PRACTITIONER
Payer: COMMERCIAL

## 2019-06-12 ENCOUNTER — HOSPITAL ENCOUNTER (OUTPATIENT)
Dept: MAMMOGRAPHY | Facility: CLINIC | Age: 29
Discharge: HOME | End: 2019-06-12
Attending: NURSE PRACTITIONER | Admitting: NURSE PRACTITIONER
Payer: COMMERCIAL

## 2019-06-12 DIAGNOSIS — N63.0 LUMP OR MASS IN BREAST: ICD-10-CM

## 2019-06-12 DIAGNOSIS — N63.11 BREAST LUMP ON RIGHT SIDE AT 10 O'CLOCK POSITION: ICD-10-CM

## 2019-06-12 PROCEDURE — 77066 DX MAMMO INCL CAD BI: CPT

## 2019-06-12 PROCEDURE — 76642 ULTRASOUND BREAST LIMITED: CPT | Mod: RT

## 2019-06-12 PROCEDURE — G0279 TOMOSYNTHESIS, MAMMO: HCPCS

## 2019-06-12 NOTE — LETTER
Jackie Quispe  [address] [place]   [facility] [address]    June 12, 2019  Date of Exam:     Dear Jackie:    Thank you for your recent visit.  Breast Imaging Result: We are pleased to inform you that the results of your recent breast imaging show no evidence of malignancy (cancer).    Breast Density: Your mammogram shows that you have dense breast tissue. This means you have a slightly higher risk of getting breast cancer. It also means your mammograms will be harder to read but it doesn't mean that mammograms aren t useful. In fact, yearly mammograms are even more important for women at higher risk.    If you are experiencing any breast problems such as a lump or localized pain we request that you discuss this with your health care provider if you haven t already done so, as additional testing may be necessary.    As you know, early detection of cancer is very important. Although mammography is the most accurate method for early detection, not all cancers are found through mammography. A thorough examination includes a combination of mammography, physical examination and breast self-examination. Currently the American College of Radiology and the Society of Breast Imaging recommend an annual mammogram for all women beginning at the age of 40.    A report of your breast imaging results was sent to: Debbi Nam    Your breast imaging will become part of your medical file here at [facility] for at least 10 years. You are responsible for informing any new health care provider or breast imaging facility of the date and location of this examination.    We appreciate the opportunity to participate in your health care.    Sincerely,    Valentino Basilio MD  Interpreting Radiologist  [facility]

## 2019-06-18 ENCOUNTER — VIRTUAL VISIT (OUTPATIENT)
Dept: FAMILY MEDICINE | Facility: CLINIC | Age: 29
End: 2019-06-18
Payer: COMMERCIAL

## 2019-06-18 DIAGNOSIS — F41.1 GAD (GENERALIZED ANXIETY DISORDER): Primary | Chronic | ICD-10-CM

## 2019-06-18 PROCEDURE — 99441 ZZC PHYSICIAN TELEPHONE EVALUATION 5-10 MIN: CPT | Performed by: NURSE PRACTITIONER

## 2019-06-18 RX ORDER — LANOLIN ALCOHOL/MO/W.PET/CERES
1000 CREAM (GRAM) TOPICAL DAILY
COMMUNITY
Start: 2019-06-18 | End: 2020-01-09

## 2019-06-18 ASSESSMENT — PATIENT HEALTH QUESTIONNAIRE - PHQ9
5. POOR APPETITE OR OVEREATING: SEVERAL DAYS
SUM OF ALL RESPONSES TO PHQ QUESTIONS 1-9: 5

## 2019-06-18 ASSESSMENT — ANXIETY QUESTIONNAIRES
GAD7 TOTAL SCORE: 5
5. BEING SO RESTLESS THAT IT IS HARD TO SIT STILL: NOT AT ALL
IF YOU CHECKED OFF ANY PROBLEMS ON THIS QUESTIONNAIRE, HOW DIFFICULT HAVE THESE PROBLEMS MADE IT FOR YOU TO DO YOUR WORK, TAKE CARE OF THINGS AT HOME, OR GET ALONG WITH OTHER PEOPLE: SOMEWHAT DIFFICULT
1. FEELING NERVOUS, ANXIOUS, OR ON EDGE: SEVERAL DAYS
2. NOT BEING ABLE TO STOP OR CONTROL WORRYING: SEVERAL DAYS
7. FEELING AFRAID AS IF SOMETHING AWFUL MIGHT HAPPEN: NOT AT ALL
3. WORRYING TOO MUCH ABOUT DIFFERENT THINGS: SEVERAL DAYS
6. BECOMING EASILY ANNOYED OR IRRITABLE: SEVERAL DAYS

## 2019-06-18 NOTE — PROGRESS NOTES
"SUBJECTIVE     Jackie Quispe is a 29 year old female who is being evaluated via a telephone visit with complaint of  Anxiety    The patient has been notified of following:      \"This telephone visit will be conducted via a call between you and your physician/provider. We have found that certain health care needs can be provided without the need for a physical exam.  This service lets us provide the care you need with a short phone conversation.  If a prescription is necessary we can send it directly to your pharmacy.  If lab work is needed we can place an order for that and you can then stop by our lab to have the test done at a later time.     If during the course of the call the physician/provider feels a telephone visit is not appropriate, you will not be charged for this service.\"      Consent has been obtained for this service by 1 care team member: yes. See the scanned image in the medical record.      Anxiety Follow-Up    How are you doing with your anxiety since your last visit? Worsened - enough to make it so she don't want to get out of bed    Are you having other symptoms that might be associated with anxiety? Yes, No appetite     Have you had a significant life event? Relationship Concerns     Are you feeling depressed? Yes:  maybe a little bit- more anxiety    Do you have any concerns with your use of alcohol or other drugs? No      Social History     Tobacco Use     Smoking status: Current Every Day Smoker     Packs/day: 0.75     Types: Cigarettes     Smokeless tobacco: Never Used     Tobacco comment: is trying to quit   Substance Use Topics     Alcohol use: Yes     Frequency: Monthly or less     Drug use: No     FIDELIA-7 SCORE 1/14/2019 3/18/2019 6/18/2019   Total Score 15 (severe anxiety) - -   Total Score 15 2 5     PHQ 1/14/2019 3/18/2019 6/18/2019   PHQ-9 Total Score 11 1 5   Q9: Thoughts of better off dead/self-harm past 2 weeks Not at all Not at all Not at all       Gabriela Valdes CMA  (MA " signature)  -----------------------------------Roomer end here--------------------    HPI   PCP: Ernestine Campos    Additional provider notes:   Missed work today  Feels like she can handle the stress at home    Last virtual visit 3/18/2019    Trial of Zoloft 25 mg at bedtime- no side effects     Some HA- likely ENT issue    Patient Active Problem List   Diagnosis     Smoker     Acne     FIDELIA (generalized anxiety disorder)     CARDIOVASCULAR SCREENING; LDL GOAL LESS THAN 160     Mirena IUD- remove by 3/2022     Lump or mass in breast     Current Outpatient Medications   Medication     levonorgestrel (MIRENA) 20 MCG/24HR IUD     sertraline (ZOLOFT) 25 MG tablet     No current facility-administered medications for this visit.        ALLERGIES  Patient has no known allergies.    I have reviewed and updated the patient's Past Medical History, Social History, Family History and Medication List.    ASSESSMENT & PLAN     1. FIDELIA (generalized anxiety disorder)  Acute, worsened  - START cyanocobalamin (VITAMIN B-12) 1000 MCG tablet; Take 1 tablet (1,000 mcg) by mouth daily  - START cholecalciferol (VITAMIN D3) 5000 units (125 mcg) capsule; Take 1 capsule (5,000 Units) by mouth daily  Dispense: 90 capsule; Refill: 0  - INCREASE sertraline (ZOLOFT) 50 MG tablet; Take 1 tablet (50 mg) daily X 3 weeks, then increase to 1.5 tablets (75 mg) daily  Dispense: 45 tablet; Refill: 0  Recheck in 1 month via telephone visit with Ernestine Campos  One time note for work given today      Risks, benefits, side effects and rationale for treatment plan fully discussed with the patient and understanding expressed. I have reviewed the note as documented above.  This accurately captures the substance of my conversation with the patient.    ROBERT Mccarty-BC    Total time of call between patient and provider was 5 minutes

## 2019-06-18 NOTE — PATIENT INSTRUCTIONS
1. FIDELIA (generalized anxiety disorder)  Acute, worsened  - START cyanocobalamin (VITAMIN B-12) 1000 MCG tablet; Take 1 tablet (1,000 mcg) by mouth daily  - START cholecalciferol (VITAMIN D3) 5000 units (125 mcg) capsule; Take 1 capsule (5,000 Units) by mouth daily  Dispense: 90 capsule; Refill: 0  - INCREASE sertraline (ZOLOFT) 50 MG tablet; Take 1 tablet (50 mg) daily X 3 weeks, then increase to 1.5 tablets (75 mg) daily  Dispense: 45 tablet; Refill: 0  Recheck in 1 month via telephone visit with Ernestine Campos  One time note for work given today

## 2019-06-18 NOTE — LETTER
June 18, 2019      Jackie Jose Enrique  [address] [place] [address] [place] [id number]        To Whom It May Concern:    Jackie Quispe  was seen today in clinic.  Please excuse her  until tomorrow June 19,2019 due to illness.        Sincerely,        Debbi Nam, CNP

## 2019-06-19 ASSESSMENT — ANXIETY QUESTIONNAIRES: GAD7 TOTAL SCORE: 5

## 2019-12-26 ENCOUNTER — HOSPITAL ENCOUNTER (EMERGENCY)
Facility: CLINIC | Age: 29
Discharge: HOME | End: 2019-12-26
Attending: NURSE PRACTITIONER | Admitting: NURSE PRACTITIONER
Payer: COMMERCIAL

## 2019-12-26 VITALS
TEMPERATURE: 98.1 F | HEIGHT: 66 IN | RESPIRATION RATE: 16 BRPM | SYSTOLIC BLOOD PRESSURE: 116 MMHG | BODY MASS INDEX: 20.89 KG/M2 | WEIGHT: 130 LBS | DIASTOLIC BLOOD PRESSURE: 62 MMHG | OXYGEN SATURATION: 99 %

## 2019-12-26 DIAGNOSIS — B97.89 ACUTE VIRAL SINUSITIS: ICD-10-CM

## 2019-12-26 DIAGNOSIS — J01.90 ACUTE VIRAL SINUSITIS: ICD-10-CM

## 2019-12-26 LAB
INTERNAL QC OK POCT: YES
S PYO AG THROAT QL IA.RAPID: NEGATIVE

## 2019-12-26 PROCEDURE — 99214 OFFICE O/P EST MOD 30 MIN: CPT | Mod: Z6 | Performed by: NURSE PRACTITIONER

## 2019-12-26 PROCEDURE — G0463 HOSPITAL OUTPT CLINIC VISIT: HCPCS | Performed by: NURSE PRACTITIONER

## 2019-12-26 PROCEDURE — 87880 STREP A ASSAY W/OPTIC: CPT | Performed by: NURSE PRACTITIONER

## 2019-12-26 PROCEDURE — 87081 CULTURE SCREEN ONLY: CPT | Performed by: NURSE PRACTITIONER

## 2019-12-26 RX ORDER — FEXOFENADINE HCL AND PSEUDOEPHEDRINE HCL 180; 240 MG/1; MG/1
1 TABLET, EXTENDED RELEASE ORAL DAILY
Qty: 10 TABLET | Refills: 0 | Status: SHIPPED | OUTPATIENT
Start: 2019-12-26 | End: 2020-01-09

## 2019-12-26 RX ORDER — FLUTICASONE PROPIONATE 50 MCG
2 SPRAY, SUSPENSION (ML) NASAL DAILY
Qty: 18.2 ML | Refills: 0 | Status: SHIPPED | OUTPATIENT
Start: 2019-12-26 | End: 2020-01-09

## 2019-12-26 RX ORDER — GUAIFENESIN 600 MG/1
1200 TABLET, EXTENDED RELEASE ORAL 2 TIMES DAILY
Qty: 30 TABLET | Refills: 0 | Status: SHIPPED | OUTPATIENT
Start: 2019-12-26 | End: 2020-01-09

## 2019-12-26 ASSESSMENT — MIFFLIN-ST. JEOR: SCORE: 1326.43

## 2019-12-26 NOTE — ED AVS SNAPSHOT
[facility] Emergency Department  [address] [facility] [address]  Phone:  [phone]  Fax:  [phone]                                    Jackie Quispe   MRN: [id number]    Department:  [facility] Emergency Department   Date of Visit:  12/26/2019           After Visit Summary Signature Page    I have received my discharge instructions, and my questions have been answered. I have discussed any challenges I see with this plan with the nurse or doctor.    ..........................................................................................................................................  Patient/Patient Representative Signature      ..........................................................................................................................................  Patient Representative Print Name and Relationship to Patient    ..................................................               ................................................  Date                                   Time    ..........................................................................................................................................  Reviewed by Signature/Title    ...................................................              ..............................................  Date                                               Time          22EPIC Rev 08/18

## 2019-12-26 NOTE — LETTER
December 26, 2019      To Whom It May Concern:      Jackie Quispe was seen in our Emergency Department today, 12/26/19.  I expect her condition to improve over the next few days.  She may return to work/school when improved.    Sincerely,        CADY Lombardi CNP

## 2019-12-26 NOTE — ED PROVIDER NOTES
History     Chief Complaint   Patient presents with     Flu Symptoms     HPI    SUBJECTIVE: Jackie Quispe  is here today because of:Sore Throat  The patient has had symptoms of earache, sore throat and headache.   Onset of symptoms was 4 days ago. Course of illness is worsening.  Patient denies exposure to illness at home or work/school.   Patient denies fever, nasal congestion/runny nose, nausea, vomiting, diarrhea, chest congestion, wheezing, mattering conjunctivitis, decreased appetite and decreased activity  Treatment measures tried include ibuprofen.  Patient is a smoker    Allergies:  No Known Allergies    Problem List:    Patient Active Problem List    Diagnosis Date Noted     Lump or mass in breast 05/29/2019     Priority: Medium     Mirena IUD- remove by 3/2022 03/02/2018     Priority: Medium     Lot: [id number]  Exp: 09/2020    Otilia Hargrove LPN         CARDIOVASCULAR SCREENING; LDL GOAL LESS THAN 160 01/10/2018     Priority: Medium     FIDELIA (generalized anxiety disorder) 11/06/2017     Priority: Medium     Acne 11/16/2012     Priority: Medium     Smoker 05/08/2012     Priority: Medium        Past Medical History:    Past Medical History:   Diagnosis Date     Chickenpox      Shingles        Past Surgical History:    Past Surgical History:   Procedure Laterality Date     MOUTH SURGERY      wisdom teeth       Family History:    Family History   Problem Relation Age of Onset     Alcohol/Drug Maternal Grandmother         alcohol     Hypertension Paternal Grandfather      Heart Disease Paternal Grandfather      Diabetes Paternal Grandfather      Emphysema Paternal Grandfather      Cancer Maternal Grandfather         lung     Alzheimer Disease Paternal Grandmother      Family History Negative Mother      Cardiovascular Father 54        MI     Heart Disease Father        Social History:  Marital Status:  Single [1]  Social History     Tobacco Use     Smoking status: Current Every Day Smoker     Packs/day:  "0.75     Types: Cigarettes     Smokeless tobacco: Never Used     Tobacco comment: is trying to quit   Substance Use Topics     Alcohol use: Yes     Frequency: Monthly or less     Drug use: No        Medications:    fexofenadine-pseudoePHEDrine (ALLEGRA-D 24) 180-240 MG 24 hr tablet  fluticasone (FLONASE) 50 MCG/ACT nasal spray  guaiFENesin (MUCINEX) 600 MG 12 hr tablet  cholecalciferol (VITAMIN D3) 5000 units (125 mcg) capsule  cyanocobalamin (VITAMIN B-12) 1000 MCG tablet  levonorgestrel (MIRENA) 20 MCG/24HR IUD  sertraline (ZOLOFT) 50 MG tablet      Review of Systems  As mentioned above in the history present illness. All other systems were reviewed and are negative.    Physical Exam   BP: 116/62  Heart Rate: 93  Temp: 98.1  F (36.7  C)  Resp: 16  Height: 167.6 cm (5' 6\")  Weight: 59 kg (130 lb)  SpO2: 99 %      Physical Exam  GENERAL: alert, no acute distress and mildly ill appearing  EYES:  Right conjunctiva is not injected and without discharge.  Left conjunctiva is not injected and without discharge.  EARS: Right TM is normal: no effusions, no erythema, and normal landmarks.  Left TM is normal: no effusions, no erythema, and normal landmarks.  NOSE: Nasal mucosa is inflamed and clear rhinorrhea noted.  Sinus maxillary tenderness on bilaterally.  THROAT: normal posterior pharynx, uvula midline, tonsillar hypertrophy absent.  NECK: supple with shotty nodes  CARDIAC:NORMAL - regular rate and rhythm without murmur.  RESP: Normal - CTA without rales, rhonchi, or wheezing.  SKIN: normal    ED Course        Procedures    No results found for this or any previous visit (from the past 24 hour(s)).  Results for orders placed or performed during the hospital encounter of 12/26/19   Rapid strep group A screen POCT     Status: Normal   Result Value Ref Range    Rapid Strep A Screen Negative neg    Internal QC OK Yes    Beta strep group A culture     Status: None   Result Value Ref Range    Specimen Description Throat     " Special Requests Specimen collected in eSwab transport (white cap)     Culture Micro No Beta Streptococcus isolated        Medications - No data to display    Assessments & Plan (with Medical Decision Making)     I have reviewed the nursing notes.    I have reviewed the findings, diagnosis, plan and need for follow up with the patient.  Medical Decision Making:  Considered influenza testing and this was declined, CXR is not indicated.  Rapid Strep test is indicated.     Assessment:  1) Sinusitis.    PLAN:  Use fluticasone nasal spray, Allegra-D, and Mucinex as needed to control symptoms, increase fluids and rest.   Follow up with any questions or problems    Discharge Medication List as of 12/26/2019  3:42 PM      START taking these medications    Details   fexofenadine-pseudoePHEDrine (ALLEGRA-D 24) 180-240 MG 24 hr tablet Take 1 tablet by mouth daily, Disp-10 tablet, R-0, E-Prescribe      fluticasone (FLONASE) 50 MCG/ACT nasal spray Spray 2 sprays into both nostrils daily, Disp-18.2 mL, R-0, E-Prescribe      guaiFENesin (MUCINEX) 600 MG 12 hr tablet Take 2 tablets (1,200 mg) by mouth 2 times daily, Disp-30 tablet, R-0, E-Prescribe             Final diagnoses:   Acute viral sinusitis       12/26/2019   [facility] EMERGENCY DEPARTMENT     Shanti Donaldson, CADY CNP  12/28/19 1017

## 2019-12-28 LAB
BACTERIA SPEC CULT: NORMAL
Lab: NORMAL
SPECIMEN SOURCE: NORMAL

## 2020-01-08 PROBLEM — F32.A MILD DEPRESSION: Status: ACTIVE | Noted: 2019-05-29

## 2020-01-08 PROBLEM — F32.A MILD DEPRESSION: Chronic | Status: ACTIVE | Noted: 2019-05-29

## 2020-01-08 NOTE — PROGRESS NOTES
SUBJECTIVE   aJckie Quispe is a  female who presents to clinic today for the following health issue(s):     Off of Zoloft for 6 months    Depression and Anxiety Follow-Up    How are you doing with your depression since your last visit? Worsened     How are you doing with your anxiety since your last visit?  No change    Are you having other symptoms that might be associated with depression or anxiety? No    Have you had a significant life event? Relationship Concerns     Do you have any concerns with your use of alcohol or other drugs? No  Back with father of kids- trying, up/down    Lexapro 11/3/17-5/30/18- didn't like how she felt, low appetite  Zoloft 1/14/19- stopped by patient  Atarax 11/3/17  Social History     Tobacco Use     Smoking status: Current Every Day Smoker     Packs/day: 0.75     Types: Cigarettes     Smokeless tobacco: Never Used     Tobacco comment: is trying to quit   Substance Use Topics     Alcohol use: Yes     Frequency: Monthly or less     Drug use: No     PHQ 3/18/2019 6/18/2019 1/9/2020   PHQ-9 Total Score 1 5 6   Q9: Thoughts of better off dead/self-harm past 2 weeks Not at all Not at all Not at all     FIDELIA-7 SCORE 3/18/2019 6/18/2019 1/9/2020   Total Score - - 8 (mild anxiety)   Total Score 2 5 8     Suicide Assessment Five-step Evaluation and Treatment (SAFE-T)    How many servings of fruits and vegetables do you eat daily?  0-1    On average, how many sweetened beverages do you drink each day (Examples: soda, juice, sweet tea, etc.  Do NOT count diet or artificially sweetened beverages)?   1    How many days per week do you miss taking your medication? NA      ADDRESS ALL HEALTH MAINTENANCE NEEDS- Place orders as needed  Health Maintenance Due   Topic Date Due     DEPRESSION ACTION PLAN  1990     PNEUMOCOCCAL IMMUNIZATION 19-64 MEDIUM RISK (1 of 1 - PPSV23) 02/02/2008     HPV  02/02/2010     PREVENTIVE CARE VISIT  01/10/2019     INFLUENZA VACCINE (1) 09/01/2019     PHQ-9   "12/18/2019       PCP   Ernestine Campos -[phone]    PROBLEM LIST        Patient Active Problem List   Diagnosis     Smoker     Acne     FIDELIA (generalized anxiety disorder)     CARDIOVASCULAR SCREENING; LDL GOAL LESS THAN 160     Mirena IUD- remove by 3/2022     Lump or mass in breast     Mild depression (H)       MEDICATIONS        Current Outpatient Medications   Medication     levonorgestrel (MIRENA) 20 MCG/24HR IUD     sertraline (ZOLOFT) 50 MG tablet     No current facility-administered medications for this visit.        Reviewed and updated as needed this visit by Provider:  Tobacco  Allergies  Meds  Med Hx  Surg Hx  Fam Hx  Soc Hx     ROS      Constitutional, neuro, ENT, endocrine, pulmonary, cardiac, gastrointestinal, genitourinary, musculoskeletal, integument and psychiatric systems are negative, except as otherwise noted.    PHYSICAL EXAM   /72   Pulse 80   Temp 98.3  F (36.8  C) (Tympanic)   Resp 16   Ht 1.727 m (5' 8\")   Wt 56.2 kg (124 lb)   SpO2 100%   BMI 18.85 kg/m    Body mass index is 18.85 kg/m .  GENERAL APPEARANCE: healthy, alert and no distress  RESP: lungs clear to auscultation - no rales, rhonchi or wheezes  CV: regular rates and rhythm, normal S1 S2, no S3 or S4 and no murmur, click or rub  MS: extremities normal- no gross deformities noted  PSYCH: mentation appears normal and affect normal/bright    ASSESSMENT & PLAN     1. Mild depression (H)  Acute, unstable  Nature Made brand for vitamins  - cholecalciferol (VITAMIN D3) 5000 units (125 mcg) capsule; Take 1 capsule (5,000 Units) by mouth daily  Dispense: 90 capsule; Refill: 0  - cyanocobalamin (VITAMIN B-12) 1000 MCG tablet; Take 1 tablet (1,000 mcg) by mouth daily  RESTART- sertraline (ZOLOFT) 50 MG tablet; Take 1 tablet (50 mg) daily X 3 weeks, then increase to 1.5 tablets (75 mg) daily  Dispense: 45 tablet; Refill: 0  Recheck in a month via telephone visit  [facility] [place]    2. FIDELIA " (generalized anxiety disorder)  Acute, unstable  - cholecalciferol (VITAMIN D3) 5000 units (125 mcg) capsule; Take 1 capsule (5,000 Units) by mouth daily  Dispense: 90 capsule; Refill: 0  - cyanocobalamin (VITAMIN B-12) 1000 MCG tablet; Take 1 tablet (1,000 mcg) by mouth daily  - sertraline (ZOLOFT) 50 MG tablet; Take 1 tablet (50 mg) daily X 3 weeks, then increase to 1.5 tablets (75 mg) daily  Dispense: 45 tablet; Refill: 0    3. Need for prophylactic vaccination and inoculation against influenza  - INFLUENZA VACCINE IM > 6 MONTHS VALENT IIV4 [97161]  - Vaccine Administration, Initial [95542]      Risks, benefits, side effects and rationale for treatment plan fully discussed with the patient and understanding expressed.    ROBERT [facility]

## 2020-01-09 ENCOUNTER — OFFICE VISIT (OUTPATIENT)
Dept: FAMILY MEDICINE | Facility: CLINIC | Age: 30
End: 2020-01-09
Payer: COMMERCIAL

## 2020-01-09 VITALS
DIASTOLIC BLOOD PRESSURE: 72 MMHG | HEART RATE: 80 BPM | HEIGHT: 68 IN | WEIGHT: 124 LBS | RESPIRATION RATE: 16 BRPM | TEMPERATURE: 98.3 F | OXYGEN SATURATION: 100 % | BODY MASS INDEX: 18.79 KG/M2 | SYSTOLIC BLOOD PRESSURE: 114 MMHG

## 2020-01-09 DIAGNOSIS — F41.1 GAD (GENERALIZED ANXIETY DISORDER): Chronic | ICD-10-CM

## 2020-01-09 DIAGNOSIS — F32.A MILD DEPRESSION: Primary | ICD-10-CM

## 2020-01-09 DIAGNOSIS — Z23 NEED FOR PROPHYLACTIC VACCINATION AND INOCULATION AGAINST INFLUENZA: ICD-10-CM

## 2020-01-09 PROCEDURE — 90471 IMMUNIZATION ADMIN: CPT | Performed by: NURSE PRACTITIONER

## 2020-01-09 PROCEDURE — 90686 IIV4 VACC NO PRSV 0.5 ML IM: CPT | Performed by: NURSE PRACTITIONER

## 2020-01-09 PROCEDURE — 99214 OFFICE O/P EST MOD 30 MIN: CPT | Mod: 25 | Performed by: NURSE PRACTITIONER

## 2020-01-09 RX ORDER — LANOLIN ALCOHOL/MO/W.PET/CERES
1000 CREAM (GRAM) TOPICAL DAILY
COMMUNITY
Start: 2020-01-09 | End: 2020-04-01 | Stop reason: ALTCHOICE

## 2020-01-09 ASSESSMENT — ANXIETY QUESTIONNAIRES
1. FEELING NERVOUS, ANXIOUS, OR ON EDGE: MORE THAN HALF THE DAYS
GAD7 TOTAL SCORE: 8
7. FEELING AFRAID AS IF SOMETHING AWFUL MIGHT HAPPEN: NOT AT ALL
GAD7 TOTAL SCORE: 8
2. NOT BEING ABLE TO STOP OR CONTROL WORRYING: MORE THAN HALF THE DAYS
7. FEELING AFRAID AS IF SOMETHING AWFUL MIGHT HAPPEN: NOT AT ALL
3. WORRYING TOO MUCH ABOUT DIFFERENT THINGS: MORE THAN HALF THE DAYS
6. BECOMING EASILY ANNOYED OR IRRITABLE: SEVERAL DAYS
4. TROUBLE RELAXING: SEVERAL DAYS
GAD7 TOTAL SCORE: 8
5. BEING SO RESTLESS THAT IT IS HARD TO SIT STILL: NOT AT ALL

## 2020-01-09 ASSESSMENT — PATIENT HEALTH QUESTIONNAIRE - PHQ9
SUM OF ALL RESPONSES TO PHQ QUESTIONS 1-9: 6
10. IF YOU CHECKED OFF ANY PROBLEMS, HOW DIFFICULT HAVE THESE PROBLEMS MADE IT FOR YOU TO DO YOUR WORK, TAKE CARE OF THINGS AT HOME, OR GET ALONG WITH OTHER PEOPLE: SOMEWHAT DIFFICULT
SUM OF ALL RESPONSES TO PHQ QUESTIONS 1-9: 6

## 2020-01-09 ASSESSMENT — MIFFLIN-ST. JEOR: SCORE: 1330.96

## 2020-01-09 NOTE — PATIENT INSTRUCTIONS
1. Mild depression (H)  Acute, unstable  Nature Made brand for vitamins  - cholecalciferol (VITAMIN D3) 5000 units (125 mcg) capsule; Take 1 capsule (5,000 Units) by mouth daily  Dispense: 90 capsule; Refill: 0  - cyanocobalamin (VITAMIN B-12) 1000 MCG tablet; Take 1 tablet (1,000 mcg) by mouth daily  RESTART- sertraline (ZOLOFT) 50 MG tablet; Take 1 tablet (50 mg) daily X 3 weeks, then increase to 1.5 tablets (75 mg) daily  Dispense: 45 tablet; Refill: 0  Recheck in a month via telephone visit  [facility] in [place]    2. FIDELIA (generalized anxiety disorder)  Acute, unstable  - cholecalciferol (VITAMIN D3) 5000 units (125 mcg) capsule; Take 1 capsule (5,000 Units) by mouth daily  Dispense: 90 capsule; Refill: 0  - cyanocobalamin (VITAMIN B-12) 1000 MCG tablet; Take 1 tablet (1,000 mcg) by mouth daily  - sertraline (ZOLOFT) 50 MG tablet; Take 1 tablet (50 mg) daily X 3 weeks, then increase to 1.5 tablets (75 mg) daily  Dispense: 45 tablet; Refill: 0    3. Need for prophylactic vaccination and inoculation against influenza  - INFLUENZA VACCINE IM > 6 MONTHS VALENT IIV4 [37311]  - Vaccine Administration, Initial [40185]

## 2020-01-09 NOTE — NURSING NOTE
"Chief Complaint   Patient presents with     Depression     Anxiety       Initial /72   Pulse 80   Temp 98.3  F (36.8  C) (Tympanic)   Resp 16   Ht 1.727 m (5' 8\")   Wt 56.2 kg (124 lb)   SpO2 100%   BMI 18.85 kg/m   Estimated body mass index is 18.85 kg/m  as calculated from the following:    Height as of this encounter: 1.727 m (5' 8\").    Weight as of this encounter: 56.2 kg (124 lb).    Patient presents to the clinic using No DME    Health Maintenance that is potentially due pending provider review:  PHQ9    Done.    Is there anyone who you would like to be able to receive your results? No  If yes have patient fill out JESSICA    "

## 2020-01-09 NOTE — LETTER
My Depression Action Plan  Name: Jackie Quispe   Date of Birth 1990  Date: 1/9/2020    My doctor: Ernestine Campos   My clinic: [address] [address]  [phone]          GREEN    ZONE   Good Control    What it looks like:     Things are going generally well. You have normal up s and down s. You may even feel depressed from time to time, but bad moods usually last less than a day.   What you need to do:  1. Continue to care for yourself (see self care plan)  2. Check your depression survival kit and update it as needed  3. Follow your physician s recommendations including any medication.  4. Do not stop taking medication unless you consult with your physician first.           YELLOW         ZONE Getting Worse    What it looks like:     Depression is starting to interfere with your life.     It may be hard to get out of bed; you may be starting to isolate yourself from others.    Symptoms of depression are starting to last most all day and this has happened for several days.     You may have suicidal thoughts but they are not constant.   What you need to do:     1. Call your care team, your response to treatment will improve if you keep your care team informed of your progress. Yellow periods are signs an adjustment may need to be made.     2. Continue your self-care, even if you have to fake it!    3. Talk to someone in your support network    4. Open up your depression survival kit           RED    ZONE Medical Alert - Get Help    What it looks like:     Depression is seriously interfering with your life.     You may experience these or other symptoms: You can t get out of bed most days, can t work or engage in other necessary activities, you have trouble taking care of basic hygiene, or basic responsibilities, thoughts of suicide or death that will not go away, self-injurious behavior.     What you need to do:  1. Call your care team and  request a same-day appointment. If they are not available (weekends or after hours) call your local crisis line, emergency room or 911.            Depression Self Care Plan / Survival Kit    Self-Care for Depression  Here s the deal. Your body and mind are really not as separate as most people think.  What you do and think affects how you feel and how you feel influences what you do and think. This means if you do things that people who feel good do, it will help you feel better.  Sometimes this is all it takes.  There is also a place for medication and therapy depending on how severe your depression is, so be sure to consult with your medical provider and/ or Behavioral Health Consultant if your symptoms are worsening or not improving.     In order to better manage my stress, I will:    Exercise  Get some form of exercise, every day. This will help reduce pain and release endorphins, the  feel good  chemicals in your brain. This is almost as good as taking antidepressants!  This is not the same as joining a gym and then never going! (they count on that by the way ) It can be as simple as just going for a walk or doing some gardening, anything that will get you moving.      Hygiene   Maintain good hygiene (Get out of bed in the morning, Make your bed, Brush your teeth, Take a shower, and Get dressed like you were going to work, even if you are unemployed).  If your clothes don't fit try to get ones that do.    Diet  I will strive to eat foods that are good for me, drink plenty of water, and avoid excessive sugar, caffeine, alcohol, and other mood-altering substances.  Some foods that are helpful in depression are: complex carbohydrates, B vitamins, flaxseed, fish or fish oil, fresh fruits and vegetables.    Psychotherapy  I agree to participate in Individual Therapy (if recommended).    Medication  If prescribed medications, I agree to take them.  Missing doses can result in serious side effects.  I understand that  drinking alcohol, or other illicit drug use, may cause potential side effects.  I will not stop my medication abruptly without first discussing it with my provider.    Staying Connected With Others  I will stay in touch with my friends, family members, and my primary care provider/team.    Use your imagination  Be creative.  We all have a creative side; it doesn t matter if it s oil painting, sand castles, or mud pies! This will also kick up the endorphins.    Witness Beauty  (AKA stop and smell the roses) Take a look outside, even in mid-winter. Notice colors, textures. Watch the squirrels and birds.     Service to others  Be of service to others.  There is always someone else in need.  By helping others we can  get out of ourselves  and remember the really important things.  This also provides opportunities for practicing all the other parts of the program.    Humor  Laugh and be silly!  Adjust your TV habits for less news and crime-drama and more comedy.    Control your stress  Try breathing deep, massage therapy, biofeedback, and meditation. Find time to relax each day.     My support system    Clinic Contact:  Phone number:    Contact 1:  Phone number:    Contact 2:  Phone number:    Sikhism/:  Phone number:    Therapist:  Phone number:    Local crisis center:    Phone number:    Other community support:  Phone number:

## 2020-01-10 ASSESSMENT — ANXIETY QUESTIONNAIRES: GAD7 TOTAL SCORE: 8

## 2020-03-02 ENCOUNTER — HEALTH MAINTENANCE LETTER (OUTPATIENT)
Age: 30
End: 2020-03-02

## 2020-03-13 ENCOUNTER — MYC MEDICAL ADVICE (OUTPATIENT)
Dept: FAMILY MEDICINE | Facility: CLINIC | Age: 30
End: 2020-03-13

## 2020-03-13 ENCOUNTER — TELEPHONE (OUTPATIENT)
Dept: FAMILY MEDICINE | Facility: CLINIC | Age: 30
End: 2020-03-13

## 2020-03-13 ASSESSMENT — PATIENT HEALTH QUESTIONNAIRE - PHQ9
10. IF YOU CHECKED OFF ANY PROBLEMS, HOW DIFFICULT HAVE THESE PROBLEMS MADE IT FOR YOU TO DO YOUR WORK, TAKE CARE OF THINGS AT HOME, OR GET ALONG WITH OTHER PEOPLE: SOMEWHAT DIFFICULT
SUM OF ALL RESPONSES TO PHQ QUESTIONS 1-9: 4

## 2020-03-13 NOTE — TELEPHONE ENCOUNTER
Panel Management Review      Patient has the following on her problem list:     Depression / Dysthymia review    Measure:  Needs PHQ-9 score of 4 or less during index window.  Administer PHQ-9 and if score is 5 or more, send encounter to provider for next steps.        PHQ-9 SCORE 3/18/2019 6/18/2019 1/9/2020   PHQ-9 Total Score - - -   PHQ-9 Total Score MyChart - - 6 (Mild depression)   PHQ-9 Total Score 1 5 6       If PHQ-9 recheck is 5 or more, route to provider for next steps.    Patient is due for:  PHQ9      Composite cancer screening  Chart review shows that this patient is due/due soon for the following None  Summary:    Patient is due/failing the following:   PHQ9    Action needed:   Patient needs to do PHQ9.    Type of outreach:    Sent [employer] message.    Questions for provider review:    None                                                                                                                                    Salud Quispe LPN       Chart routed to Care Team .

## 2020-03-14 ASSESSMENT — PATIENT HEALTH QUESTIONNAIRE - PHQ9: SUM OF ALL RESPONSES TO PHQ QUESTIONS 1-9: 4

## 2020-04-01 ENCOUNTER — VIRTUAL VISIT (OUTPATIENT)
Dept: FAMILY MEDICINE | Facility: CLINIC | Age: 30
End: 2020-04-01
Payer: COMMERCIAL

## 2020-04-01 DIAGNOSIS — J00 ACUTE RHINITIS: Primary | ICD-10-CM

## 2020-04-01 PROCEDURE — 99213 OFFICE O/P EST LOW 20 MIN: CPT | Mod: GT | Performed by: NURSE PRACTITIONER

## 2020-04-01 RX ORDER — MULTIVITAMIN,THERAPEUTIC
1 TABLET ORAL DAILY
COMMUNITY

## 2020-04-01 ASSESSMENT — PAIN SCALES - GENERAL: PAINLEVEL: MODERATE PAIN (4)

## 2020-04-01 NOTE — PATIENT INSTRUCTIONS
This is most likely a viral versus allergic rhinitis  Make sure you are getting a lot of rest and fluids  Ibuprofen and/or tylenol for discomfort or fever  Warm salt water gargles 3-4 times a day for sore throat   Start Flonase nasal spray daily (take for minimum of 1 week and at least 1 week after feeling better) when administering POINT OUT towards eyes  Continue daily Allegra D through the weekend   Cool mist vaporizer at night   If you can tolerate and get more congested you can use a nasal saline flush such as the [place] Pot  Sleep with head of bed up at night to promote drainage     No Antibiotic are warranted at this  time.  It is important if your symptoms worsen or not improved by Monday contact me and we will evaluate need for antibiotic.

## 2020-04-01 NOTE — PROGRESS NOTES
"Jackie Quispe is a 30 year old female who is being evaluated via a billable video visit.      The patient has been notified of following:     \"This video visit will be conducted via a call between you and your physician/provider. We have found that certain health care needs can be provided without the need for an in-person physical exam.  This service lets us provide the care you need with a video conversation.  If a prescription is necessary we can send it directly to your pharmacy.  If lab work is needed we can place an order for that and you can then stop by our lab to have the test done at a later time.    If during the course of the call the physician/provider feels a video visit is not appropriate, you will not be charged for this service.\"     Patient has given verbal consent for Video visit? No    Patient would like the video invitation sent by: Text to cell phone: [phone]        Subjective     Jackie Quispe is a 30 year old female who presents to clinic today for the following health issues:    HPI   ENT Symptoms             Symptoms: cc Present Absent Comment   Fever/Chills   x    Fatigue  x     Muscle Aches   x    Eye Irritation   x    Sneezing   x    Nasal Dean/Drg   x    Sinus Pressure/Pain  x   sinus plus headache   Loss of smell   x    Dental pain  x  Jaw by ear   Sore Throat x x     Swollen Glands  x     Ear Pain/Fullness  x  Pressure on both ears and jaw   Cough  x  Slight cough   Wheeze   x    Chest Pain   x    Shortness of breath   x    Rash   x    Other   x Denies GI sx     Symptom duration:  since 3/28   Symptom severity:  mild   Treatments tried:  Allegra D helped with headache   Contacts:  none known       Video Start Time: 0856      Patient Active Problem List   Diagnosis     Smoker     Acne     FIDELIA (generalized anxiety disorder)     CARDIOVASCULAR SCREENING; LDL GOAL LESS THAN 160     Mirena IUD- remove by 3/2022     Lump or mass in breast     Mild depression (H)     Past Surgical " "History:   Procedure Laterality Date     MOUTH SURGERY      wisdom teeth       Social History     Tobacco Use     Smoking status: Current Every Day Smoker     Packs/day: 0.75     Types: Cigarettes     Smokeless tobacco: Never Used     Tobacco comment: is trying to quit   Substance Use Topics     Alcohol use: Yes     Frequency: Monthly or less     Family History   Problem Relation Age of Onset     Alcohol/Drug Maternal Grandmother         alcohol     Hypertension Paternal Grandfather      Heart Disease Paternal Grandfather      Diabetes Paternal Grandfather      Emphysema Paternal Grandfather      Cancer Maternal Grandfather         lung     Alzheimer Disease Paternal Grandmother      Family History Negative Mother      Cardiovascular Father 54        MI     Heart Disease Father          Current Outpatient Medications   Medication Sig Dispense Refill     levonorgestrel (MIRENA) 20 MCG/24HR IUD 1 each (20 mcg) by Intrauterine route once 7/8/2016 1 each 0     multivitamin, therapeutic (THERA-VIT) TABS tablet Take 1 tablet by mouth daily       sertraline (ZOLOFT) 50 MG tablet Take 1 tablet (50 mg) daily X 3 weeks, then increase to 1.5 tablets (75 mg) daily (Patient not taking: Reported on 4/1/2020) 45 tablet 0     No Known Allergies    Reviewed and updated as needed this visit by Provider  Tobacco  Allergies  Meds  Problems  Med Hx  Surg Hx  Fam Hx         Review of Systems   ROS COMP: Constitutional, HEENT, cardiovascular, pulmonary, gi and gu systems are negative, except as otherwise noted.      Objective    There were no vitals taken for this visit.  Estimated body mass index is 18.85 kg/m  as calculated from the following:    Height as of 1/9/20: 1.727 m (5' 8\").    Weight as of 1/9/20: 56.2 kg (124 lb).  Physical Exam   GENERAL: healthy, alert and no distress and fatigue  RESP: no apparent respiratory distress  PSYCH: mentation appears normal, affect normal/bright    Diagnostic Test Results:  Labs reviewed " in Epic  none         Assessment & Plan     1. Acute rhinitis  Acute, symptoms consistent with sinus/allergy drainage. Sore throat likely 2/2 to post nasal drainage. Patient took Allegra D yesterday which was some helpful. Advised to continue Allegra-D and start over the counter Flonase - 2 sprays each nostril daily. Other supportive cares discussed as in patient instructions. Patient advised if not improving or worsening by Monday to notify this provider. Patient verbalized understanding.      Tobacco Cessation:   reports that she has been smoking cigarettes. She has been smoking about 0.75 packs per day. She has never used smokeless tobacco.  Tobacco Cessation Action Plan: Not addressed at this visit.         Patient Instructions   This is most likely a viral versus allergic rhinitis  Make sure you are getting a lot of rest and fluids  Ibuprofen and/or tylenol for discomfort or fever  Warm salt water gargles 3-4 times a day for sore throat   Start Flonase nasal spray daily (take for minimum of 1 week and at least 1 week after feeling better) when administering POINT OUT towards eyes  Continue daily Allegra D through the weekend   Cool mist vaporizer at night   If you can tolerate and get more congested you can use a nasal saline flush such as the [place] Pot  Sleep with head of bed up at night to promote drainage     No Antibiotic are warranted at this  time.  It is important if your symptoms worsen or not improved by Monday contact me and we will evaluate need for antibiotic.                No follow-ups on file.    CADY Hahn CNP  [facility]      Video-Visit Details    Type of service:  Video Visit    Video End Time (time video stopped): 0902    Originating Location (pt. Location): Home    Distant Location (provider location):  [facility]     Mode of Communication:  Video Conference via [place]    No follow-ups on file.       CADY Hahn CNP

## 2020-07-13 ASSESSMENT — ENCOUNTER SYMPTOMS
SHORTNESS OF BREATH: 0
FEVER: 0
COUGH: 0
HEADACHES: 0
FREQUENCY: 0
HEMATOCHEZIA: 0
PARESTHESIAS: 0
ABDOMINAL PAIN: 0
CHILLS: 0
PALPITATIONS: 0
EYE PAIN: 0
DYSURIA: 0
CONSTIPATION: 0
SORE THROAT: 0
DIZZINESS: 0
DIARRHEA: 0
HEMATURIA: 0
ARTHRALGIAS: 0
JOINT SWELLING: 0
NERVOUS/ANXIOUS: 0
MYALGIAS: 0
BREAST MASS: 0
WEAKNESS: 0
NAUSEA: 0
HEARTBURN: 0

## 2020-07-17 ENCOUNTER — OFFICE VISIT (OUTPATIENT)
Dept: FAMILY MEDICINE | Facility: CLINIC | Age: 30
End: 2020-07-17
Payer: COMMERCIAL

## 2020-07-17 ENCOUNTER — TELEPHONE (OUTPATIENT)
Dept: FAMILY MEDICINE | Facility: CLINIC | Age: 30
End: 2020-07-17

## 2020-07-17 VITALS
HEART RATE: 71 BPM | HEIGHT: 67 IN | DIASTOLIC BLOOD PRESSURE: 60 MMHG | OXYGEN SATURATION: 100 % | WEIGHT: 125.8 LBS | SYSTOLIC BLOOD PRESSURE: 104 MMHG | BODY MASS INDEX: 19.74 KG/M2 | TEMPERATURE: 98.3 F | RESPIRATION RATE: 18 BRPM

## 2020-07-17 DIAGNOSIS — Z71.6 ENCOUNTER FOR SMOKING CESSATION COUNSELING: ICD-10-CM

## 2020-07-17 DIAGNOSIS — Z00.00 WELL FEMALE EXAM WITHOUT GYNECOLOGICAL EXAM: Primary | ICD-10-CM

## 2020-07-17 DIAGNOSIS — Z13.1 SCREENING FOR DIABETES MELLITUS: ICD-10-CM

## 2020-07-17 DIAGNOSIS — Z13.220 SCREENING FOR HYPERLIPIDEMIA: ICD-10-CM

## 2020-07-17 PROCEDURE — 99395 PREV VISIT EST AGE 18-39: CPT | Performed by: NURSE PRACTITIONER

## 2020-07-17 RX ORDER — NICOTINE 21 MG/24HR
1 PATCH, TRANSDERMAL 24 HOURS TRANSDERMAL EVERY 24 HOURS
Qty: 30 PATCH | Refills: 0 | Status: SHIPPED | OUTPATIENT
Start: 2020-07-17 | End: 2021-04-08

## 2020-07-17 RX ORDER — VARENICLINE TARTRATE 1 MG/1
1 TABLET, FILM COATED ORAL 2 TIMES DAILY
Qty: 60 TABLET | Refills: 1 | Status: SHIPPED | OUTPATIENT
Start: 2020-07-17 | End: 2021-04-08

## 2020-07-17 ASSESSMENT — ENCOUNTER SYMPTOMS
HEMATOCHEZIA: 0
MYALGIAS: 0
PALPITATIONS: 0
COUGH: 0
NERVOUS/ANXIOUS: 0
FREQUENCY: 0
CHILLS: 0
DIZZINESS: 0
SORE THROAT: 0
HEADACHES: 0
HEARTBURN: 0
BREAST MASS: 0
PARESTHESIAS: 0
NAUSEA: 0
HEMATURIA: 0
ABDOMINAL PAIN: 0
CONSTIPATION: 0
EYE PAIN: 0
DYSURIA: 0
FEVER: 0
ARTHRALGIAS: 0
SHORTNESS OF BREATH: 0
DIARRHEA: 0
JOINT SWELLING: 0
WEAKNESS: 0

## 2020-07-17 ASSESSMENT — MIFFLIN-ST. JEOR: SCORE: 1318.26

## 2020-07-17 NOTE — TELEPHONE ENCOUNTER
Pt called and needs this appt for her wellness program thru her medical insurance at work.  Rosetta BIRCHCSS

## 2020-07-17 NOTE — PROGRESS NOTES
SUBJECTIVE:   CC: Jackie Quispe is an 30 year old woman who presents for preventive health visit.     Healthy Habits:     Getting at least 3 servings of Calcium per day:  NO    Bi-annual eye exam:  Yes    Dental care twice a year:  Yes    Sleep apnea or symptoms of sleep apnea:  None    Diet:  Regular (no restrictions)    Frequency of exercise:  1 day/week    Duration of exercise:  Less than 15 minutes    Taking medications regularly:  Yes    Medication side effects:  Not applicable    PHQ-2 Total Score: 0    Additional concerns today:  Yes      1 PPD   Wants to quit smoking       Today's PHQ-2 Score:   PHQ-2 ( 1999 [employer]) 7/13/2020   Q1: Little interest or pleasure in doing things 0   Q2: Feeling down, depressed or hopeless 0   PHQ-2 Score 0   Q1: Little interest or pleasure in doing things Not at all   Q2: Feeling down, depressed or hopeless Not at all   PHQ-2 Score 0       Abuse: Current or Past(Physical, Sexual or Emotional)- No  Do you feel safe in your environment? Yes        Social History     Tobacco Use     Smoking status: Current Every Day Smoker     Packs/day: 0.75     Types: Cigarettes     Smokeless tobacco: Never Used     Tobacco comment: is trying to quit   Substance Use Topics     Alcohol use: Yes     Frequency: Monthly or less     If you drink alcohol do you typically have >3 drinks per day or >7 drinks per week? No    Alcohol Use 7/13/2020   Prescreen: >3 drinks/day or >7 drinks/week? No   Prescreen: >3 drinks/day or >7 drinks/week? -   No flowsheet data found.    Reviewed orders with patient.  Reviewed health maintenance and updated orders accordingly - Yes      Mammogram not appropriate for this patient based on age.    Pertinent mammograms are reviewed under the imaging tab.  History of abnormal Pap smear: NO - age 30- 65 PAP every 3 years recommended  PAP / HPV 1/10/2018 11/3/2014 5/8/2012   PAP NIL NIL NIL     Reviewed and updated as needed this visit by clinical staff         Reviewed  "and updated as needed this visit by Provider      Review of Systems   Constitutional: Negative for chills and fever.   HENT: Negative for congestion, ear pain, hearing loss and sore throat.    Eyes: Negative for pain and visual disturbance.   Respiratory: Negative for cough and shortness of breath.    Cardiovascular: Negative for chest pain, palpitations and peripheral edema.   Gastrointestinal: Negative for abdominal pain, constipation, diarrhea, heartburn, hematochezia and nausea.   Breasts:  Negative for tenderness, breast mass and discharge.   Genitourinary: Negative for dysuria, frequency, genital sores, hematuria, pelvic pain, urgency, vaginal bleeding and vaginal discharge.   Musculoskeletal: Negative for arthralgias, joint swelling and myalgias.   Skin: Negative for rash.   Neurological: Negative for dizziness, weakness, headaches and paresthesias.   Psychiatric/Behavioral: Negative for mood changes. The patient is not nervous/anxious.      OBJECTIVE:   /60   Pulse 71   Temp 98.3  F (36.8  C)   Resp 18   Ht 1.702 m (5' 7\")   Wt 57.1 kg (125 lb 12.8 oz)   SpO2 100%   Breastfeeding No   BMI 19.70 kg/m    Physical Exam  GENERAL: healthy, alert and no distress  EYES: Eyes grossly normal to inspection, PERRL and conjunctivae and sclerae normal  HENT: ear canals and TM's normal, nose and mouth without ulcers or lesions  NECK: no adenopathy, no asymmetry, masses, or scars and thyroid normal to palpation  RESP: lungs clear to auscultation - no rales, rhonchi or wheezes  CV: regular rate and rhythm, normal S1 S2, no S3 or S4, no murmur, click or rub, no peripheral edema and peripheral pulses strong  ABDOMEN: soft, nontender, no hepatosplenomegaly, no masses and bowel sounds normal  MS: no gross musculoskeletal defects noted, no edema  SKIN: no suspicious lesions or rashes  NEURO: Normal strength and tone, mentation intact and speech normal  PSYCH: mentation appears normal, affect " "normal/bright    Diagnostic Test Results:  Labs reviewed in Epic    ASSESSMENT/PLAN:   1. Well female exam without gynecological exam  Needs biometric paperwork completed for employer   Will get labs and then will fax   She plans on scheduling her pap smear in JAN 2. Encounter for smoking cessation counseling  - varenicline (CHANTIX JANA) 0.5 MG X 11 & 1 MG X 42 tablet; Take 0.5 mg tab daily for 3 days, THEN 0.5 mg tab twice daily for 4 days, THEN 1 mg twice daily.  Dispense: 53 tablet; Refill: 0  - varenicline (CHANTIX) 1 MG tablet; Take 1 tablet (1 mg) by mouth 2 times daily  Dispense: 60 tablet; Refill: 1  - nicotine (NICODERM CQ) 21 MG/24HR 24 hr patch; Place 1 patch onto the skin every 24 hours  Dispense: 30 patch; Refill: 0  - nicotine (NICODERM CQ) 14 MG/24HR 24 hr patch; Place 1 patch onto the skin every 24 hours  Dispense: 30 patch; Refill: 0  - nicotine (NICODERM CQ) 7 MG/24HR 24 hr patch; Place 1 patch onto the skin every 24 hours  Dispense: 30 patch; Refill: 1    3. Screening for hyperlipidemia  - Lipid panel reflex to direct LDL Fasting; Future    4. Screening for diabetes mellitus  - Glucose; Future    COUNSELING:  Reviewed preventive health counseling, as reflected in patient instructions       Regular exercise       Healthy diet/nutrition    Estimated body mass index is 18.85 kg/m  as calculated from the following:    Height as of 1/9/20: 1.727 m (5' 8\").    Weight as of 1/9/20: 56.2 kg (124 lb).    Weight management plan noted, stable and monitoring     reports that she has been smoking cigarettes. She has been smoking about 0.75 packs per day. She has never used smokeless tobacco.  Tobacco Cessation Action Plan: Pharmacotherapies : Chantix and Nicotine patch    Counseling Resources:  ATP IV Guidelines  Pooled Cohorts Equation Calculator  Breast Cancer Risk Calculator  FRAX Risk Assessment  ICSI Preventive Guidelines  Dietary Guidelines for Americans, 2010  USDA's MyPlate  ASA Prophylaxis  Lung " CA Screening    Ernestine Campos NP  [facility]

## 2020-07-17 NOTE — PATIENT INSTRUCTIONS
chantix and nicotine patches ordered   Start the patches 2 week after starting the chantix   Quit plan       Will old on to biometric paperwork until appointment as scheduled in 2 weeks       [place] pap in [place]

## 2020-07-17 NOTE — TELEPHONE ENCOUNTER
Left message on phone, return call to provider care team re: appointment.  When patient returns call, please tell her.    Per Ernestine Campos CNP, patient does not need pap until January.     She can cancel this appointment. If she needs to talk about medication, please change to video appointment.

## 2020-08-07 DIAGNOSIS — Z13.220 SCREENING FOR HYPERLIPIDEMIA: ICD-10-CM

## 2020-08-07 DIAGNOSIS — Z13.1 SCREENING FOR DIABETES MELLITUS: ICD-10-CM

## 2020-08-07 LAB
CHOLEST SERPL-MCNC: 193 MG/DL
GLUCOSE SERPL-MCNC: 79 MG/DL (ref 70–99)
HDLC SERPL-MCNC: 93 MG/DL
LDLC SERPL CALC-MCNC: 85 MG/DL
NONHDLC SERPL-MCNC: 100 MG/DL
TRIGL SERPL-MCNC: 73 MG/DL

## 2020-08-07 PROCEDURE — 36415 COLL VENOUS BLD VENIPUNCTURE: CPT | Performed by: NURSE PRACTITIONER

## 2020-08-07 PROCEDURE — 82947 ASSAY GLUCOSE BLOOD QUANT: CPT | Performed by: NURSE PRACTITIONER

## 2020-08-07 PROCEDURE — 80061 LIPID PANEL: CPT | Performed by: NURSE PRACTITIONER

## 2020-12-14 ENCOUNTER — HEALTH MAINTENANCE LETTER (OUTPATIENT)
Age: 30
End: 2020-12-14

## 2020-12-15 ENCOUNTER — E-VISIT (OUTPATIENT)
Dept: FAMILY MEDICINE | Facility: CLINIC | Age: 30
End: 2020-12-15
Payer: COMMERCIAL

## 2020-12-15 DIAGNOSIS — B37.31 CANDIDAL VULVOVAGINITIS: Primary | ICD-10-CM

## 2020-12-15 PROCEDURE — 99421 OL DIG E/M SVC 5-10 MIN: CPT | Performed by: NURSE PRACTITIONER

## 2020-12-15 RX ORDER — FLUCONAZOLE 150 MG/1
150 TABLET ORAL ONCE
Qty: 1 TABLET | Refills: 0 | Status: SHIPPED | OUTPATIENT
Start: 2020-12-15 | End: 2020-12-15

## 2021-04-06 ASSESSMENT — ANXIETY QUESTIONNAIRES
4. TROUBLE RELAXING: MORE THAN HALF THE DAYS
7. FEELING AFRAID AS IF SOMETHING AWFUL MIGHT HAPPEN: NOT AT ALL
GAD7 TOTAL SCORE: 11
5. BEING SO RESTLESS THAT IT IS HARD TO SIT STILL: NOT AT ALL
6. BECOMING EASILY ANNOYED OR IRRITABLE: SEVERAL DAYS
GAD7 TOTAL SCORE: 11
GAD7 TOTAL SCORE: 11
2. NOT BEING ABLE TO STOP OR CONTROL WORRYING: MORE THAN HALF THE DAYS
1. FEELING NERVOUS, ANXIOUS, OR ON EDGE: NEARLY EVERY DAY
7. FEELING AFRAID AS IF SOMETHING AWFUL MIGHT HAPPEN: NOT AT ALL
3. WORRYING TOO MUCH ABOUT DIFFERENT THINGS: NEARLY EVERY DAY

## 2021-04-06 ASSESSMENT — PATIENT HEALTH QUESTIONNAIRE - PHQ9
10. IF YOU CHECKED OFF ANY PROBLEMS, HOW DIFFICULT HAVE THESE PROBLEMS MADE IT FOR YOU TO DO YOUR WORK, TAKE CARE OF THINGS AT HOME, OR GET ALONG WITH OTHER PEOPLE: SOMEWHAT DIFFICULT
SUM OF ALL RESPONSES TO PHQ QUESTIONS 1-9: 10
SUM OF ALL RESPONSES TO PHQ QUESTIONS 1-9: 10

## 2021-04-07 ASSESSMENT — ANXIETY QUESTIONNAIRES: GAD7 TOTAL SCORE: 11

## 2021-04-08 ENCOUNTER — OFFICE VISIT (OUTPATIENT)
Dept: FAMILY MEDICINE | Facility: CLINIC | Age: 31
End: 2021-04-08
Payer: COMMERCIAL

## 2021-04-08 VITALS
SYSTOLIC BLOOD PRESSURE: 126 MMHG | HEIGHT: 67 IN | WEIGHT: 130 LBS | HEART RATE: 72 BPM | RESPIRATION RATE: 18 BRPM | TEMPERATURE: 97.1 F | DIASTOLIC BLOOD PRESSURE: 70 MMHG | BODY MASS INDEX: 20.4 KG/M2

## 2021-04-08 DIAGNOSIS — F41.1 GAD (GENERALIZED ANXIETY DISORDER): ICD-10-CM

## 2021-04-08 DIAGNOSIS — F32.A MILD DEPRESSION: ICD-10-CM

## 2021-04-08 DIAGNOSIS — J42 CHRONIC BRONCHITIS, UNSPECIFIED CHRONIC BRONCHITIS TYPE (H): ICD-10-CM

## 2021-04-08 DIAGNOSIS — R53.83 FATIGUE, UNSPECIFIED TYPE: Primary | ICD-10-CM

## 2021-04-08 LAB
ANION GAP SERPL CALCULATED.3IONS-SCNC: 5 MMOL/L (ref 3–14)
BUN SERPL-MCNC: 8 MG/DL (ref 7–30)
CALCIUM SERPL-MCNC: 9 MG/DL (ref 8.5–10.1)
CHLORIDE SERPL-SCNC: 104 MMOL/L (ref 94–109)
CO2 SERPL-SCNC: 26 MMOL/L (ref 20–32)
CREAT SERPL-MCNC: 0.66 MG/DL (ref 0.52–1.04)
DEPRECATED CALCIDIOL+CALCIFEROL SERPL-MC: 20 UG/L (ref 20–75)
ERYTHROCYTE [DISTWIDTH] IN BLOOD BY AUTOMATED COUNT: 12.4 % (ref 10–15)
GFR SERPL CREATININE-BSD FRML MDRD: >90 ML/MIN/{1.73_M2}
GLUCOSE SERPL-MCNC: 92 MG/DL (ref 70–99)
HCT VFR BLD AUTO: 40.6 % (ref 35–47)
HGB BLD-MCNC: 13.2 G/DL (ref 11.7–15.7)
MCH RBC QN AUTO: 30.3 PG (ref 26.5–33)
MCHC RBC AUTO-ENTMCNC: 32.5 G/DL (ref 31.5–36.5)
MCV RBC AUTO: 93 FL (ref 78–100)
PLATELET # BLD AUTO: 235 10E9/L (ref 150–450)
POTASSIUM SERPL-SCNC: 3.9 MMOL/L (ref 3.4–5.3)
RBC # BLD AUTO: 4.35 10E12/L (ref 3.8–5.2)
SODIUM SERPL-SCNC: 135 MMOL/L (ref 133–144)
TSH SERPL DL<=0.005 MIU/L-ACNC: 0.6 MU/L (ref 0.4–4)
VIT B12 SERPL-MCNC: 408 PG/ML (ref 193–986)
WBC # BLD AUTO: 10.5 10E9/L (ref 4–11)

## 2021-04-08 PROCEDURE — 82306 VITAMIN D 25 HYDROXY: CPT | Performed by: NURSE PRACTITIONER

## 2021-04-08 PROCEDURE — 80048 BASIC METABOLIC PNL TOTAL CA: CPT | Performed by: NURSE PRACTITIONER

## 2021-04-08 PROCEDURE — 84443 ASSAY THYROID STIM HORMONE: CPT | Performed by: NURSE PRACTITIONER

## 2021-04-08 PROCEDURE — 36415 COLL VENOUS BLD VENIPUNCTURE: CPT | Performed by: NURSE PRACTITIONER

## 2021-04-08 PROCEDURE — 82607 VITAMIN B-12: CPT | Performed by: NURSE PRACTITIONER

## 2021-04-08 PROCEDURE — 99214 OFFICE O/P EST MOD 30 MIN: CPT | Performed by: NURSE PRACTITIONER

## 2021-04-08 PROCEDURE — 85027 COMPLETE CBC AUTOMATED: CPT | Performed by: NURSE PRACTITIONER

## 2021-04-08 RX ORDER — BUPROPION HYDROCHLORIDE 150 MG/1
TABLET ORAL
Qty: 42 TABLET | Refills: 0 | Status: SHIPPED | OUTPATIENT
Start: 2021-04-08 | End: 2021-05-04

## 2021-04-08 ASSESSMENT — MIFFLIN-ST. JEOR: SCORE: 1332.31

## 2021-04-08 ASSESSMENT — PATIENT HEALTH QUESTIONNAIRE - PHQ9: SUM OF ALL RESPONSES TO PHQ QUESTIONS 1-9: 10

## 2021-04-08 NOTE — PROGRESS NOTES
Assessment & Plan     Chronic bronchitis, unspecified chronic bronchitis type (H)   Controlled no change in treatment plan patient would like to work on smoking sensation    Mild depression (H)  Patient has increased anxiety and depression due to family situation will start on Wellbutrin and titrate up to 300 mg will also help her with her smoking sensation follow-up in 3 days for refills  - buPROPion (WELLBUTRIN XL) 150 MG 24 hr tablet  Dispense: 42 tablet; Refill: 0    Fatigue, unspecified type  Patient noticed increased fatigue will obtain labs for evaluation  - TSH with free T4 reflex  - Vitamin D Deficiency  - Vitamin B12  - Basic metabolic panel  - CBC with platelets      No follow-ups on file.    Gwendolyn Adkins, CADY CNP  [facility]    Trip Mejia is a 31 year old who presents for the following health issues     HPI     Patient has been feeling very fatigued lately. She has had trouble with her skin breaking out and she has noticed that her nails have a lot of ridges in them. She is wondering if she could get her thyroid and other hormone levels checked.    Anxiety Follow-Up    How are you doing with your anxiety since your last visit? Worsened     Are you having other symptoms that might be associated with anxiety? No    Have you had a significant life event? YES     Are you feeling depressed? Yes:  .    Do you have any concerns with your use of alcohol or other drugs? No    Social History     Tobacco Use     Smoking status: Current Every Day Smoker     Packs/day: 0.75     Types: Cigarettes     Smokeless tobacco: Never Used     Tobacco comment: is trying to quit   Substance Use Topics     Alcohol use: Yes     Frequency: Monthly or less     Drug use: No     FIDELIA-7 SCORE 6/18/2019 1/9/2020 4/6/2021   Total Score - 8 (mild anxiety) 11 (moderate anxiety)   Total Score 5 8 11     PHQ 3/13/2020 3/13/2020 4/6/2021   PHQ-9 Total Score 4 4 10   Q9: Thoughts of better off  "dead/self-harm past 2 weeks Not at all Not at all Not at all     Last PHQ-9 4/6/2021   1.  Little interest or pleasure in doing things 2   2.  Feeling down, depressed, or hopeless 2   3.  Trouble falling or staying asleep, or sleeping too much 2   4.  Feeling tired or having little energy 2   5.  Poor appetite or overeating 1   6.  Feeling bad about yourself 1   7.  Trouble concentrating 0   8.  Moving slowly or restless 0   Q9: Thoughts of better off dead/self-harm past 2 weeks 0   PHQ-9 Total Score 10   Difficulty at work, home, or with people -         Review of Systems   Constitutional, HEENT, cardiovascular, pulmonary, gi and gu systems are negative, except as otherwise noted.      Objective    /70 (BP Location: Right arm, Cuff Size: Adult Regular)   Pulse 72   Temp 97.1  F (36.2  C) (Tympanic)   Resp 18   Ht 1.702 m (5' 7\")   Wt 59 kg (130 lb)   BMI 20.36 kg/m    There is no height or weight on file to calculate BMI.  Physical Exam   GENERAL: healthy, alert and no distress  EYES: Eyes grossly normal to inspection, PERRL and conjunctivae and sclerae normal  HENT: ear canals and TM's normal, nose and mouth without ulcers or lesions  NECK: no adenopathy, no asymmetry, masses, or scars and thyroid normal to palpation  RESP: lungs clear to auscultation - no rales, rhonchi or wheezes  CV: regular rate and rhythm, normal S1 S2, no S3 or S4, no murmur, click or rub, no peripheral edema and peripheral pulses strong  ABDOMEN: soft, nontender, no hepatosplenomegaly, no masses and bowel sounds normal  MS: no gross musculoskeletal defects noted, no edema  SKIN: no suspicious lesions or rashes  NEURO: Normal strength and tone, mentation intact and speech normal  PSYCH: mentation appears normal, affect normal/bright    Results for orders placed or performed in visit on 04/08/21 (from the past 24 hour(s))   CBC with platelets   Result Value Ref Range    WBC 10.5 4.0 - 11.0 10e9/L    RBC Count 4.35 3.8 - 5.2 " 10e12/L    Hemoglobin 13.2 11.7 - 15.7 g/dL    Hematocrit 40.6 35.0 - 47.0 %    MCV 93 78 - 100 fl    MCH 30.3 26.5 - 33.0 pg    MCHC 32.5 31.5 - 36.5 g/dL    RDW 12.4 10.0 - 15.0 %    Platelet Count 235 150 - 450 10e9/L

## 2021-04-08 NOTE — PATIENT INSTRUCTIONS
Patient Education     Anxiety Reaction  Anxiety is the feeling we all get when we think something bad might happen. It is a normal response to stress and normally causes only a mild reaction. When anxiety becomes more severe, it can interfere with daily life. In some cases, you may not even be aware of what you re anxious about. There may also be a genetic link. Or it may be a learned behavior in the home.   Both psychological and physical triggers cause stress reaction. It's often a response to fear or emotional stress, real or imagined. This stress may come from home, family, work, or social relationships.   During an anxiety reaction, you may feel:    Helpless    Nervous    Depressed    Grouchy  Your body may show signs of anxiety in many ways. You may experience:    Dry mouth    Shakiness    Dizziness    Weakness    Trouble breathing    Breathing fast (hyperventilating)    Chest pressure    Sweating    Headache    Nausea    Diarrhea    Tiredness    Inability to sleep    Sexual problems  Home care    Try to find the sources of stress in your life. They may not be obvious. These may include:  ? Daily hassles of life (such as traffic jams, missed appointments, or car troubles)  ? Major life changes, both good (new baby or job promotion) and bad (loss of job or loss of loved one)  ? Overload (feeling that you have too many responsibilities and can't take care of all of them at once)  ? Feeling helpless or feeling that your problems can't be solved    Notice how your body reacts to stress. Learn to listen to your body signals. This will help you take action before the stress becomes severe.    When you can, do something about the source of your stress. (Avoid hassles, limit the amount of change that happens in your life at one time, and take a break when you feel overloaded).    Unfortunately, many stressful situations can't be avoided. It is necessary to learn how to better manage stress. There are many proven  methods that will reduce your anxiety. These include simple things such as exercise, good nutrition, and adequate rest. Also, there are certain techniques that are helpful:  ? Relaxation  ? Breathing exercises  ? Visualization  ? Biofeedback  ? Meditation  For more information about this, talk with your healthcare provider. Or check online or at your local library or bookstore. You'll find many books and audiobooks on this subject.   Follow-up care  If you feel your anxiety is not responding to self-help measures, call your healthcare provider or make an appointment with a counselor. You may need short-term psychological counseling or medicine to help you manage stress.   Call 911  Call 911 if any of these happen:     Trouble breathing    Confusion    Drowsiness or trouble waking up    Fainting or loss of consciousness    Rapid heart rate    Seizure    New chest pain that becomes more severe, lasts longer, or spreads into your shoulder, arm, neck, jaw, or back  When to get medical advice  Call your healthcare provider right away if any of these happen:    Your symptoms get worse    Severe headache not eased by rest and mild pain reliever  [employer] last reviewed this educational content on 4/1/2020 2000-2020 [employer]. All rights reserved. This information is not intended as a substitute for professional medical care. Always follow your healthcare professional's instructions.           Patient Education     Depression  Depression is one of the most common mental health problems today. It is not just a state of unhappiness or sadness. It is a true disease. The cause seems to be linked to a drop in chemicals that transmit signals in the brain. Having a family history of depression, alcoholism, or suicide increases the risk. Chronic illness, chronic pain, migraine headaches, and high emotional stress also increase the risk.   Depression is something we tend to recognize in others. But we may have a  hard time seeing it in ourselves. It can show in many physical and emotional ways:     Loss of appetite    Overeating    Not being able to sleep    Sleeping too much    Excessive tiredness not linked to physical exertion    Restlessness or irritability    Slowness of movement or speech    Feeling depressed or withdrawn    Loss of interest in things you once enjoyed    Trouble concentrating, remembering, or making decisions    Thoughts of harming or killing oneself, or thoughts that life is not worth living    Low self-esteem  The treatment for depression may include both medicine and psychotherapy. Antidepressants can reduce suffering. They can also improve the ability to function during the depressed period. Therapy can offer emotional support. It can also help you understand emotional factors that may be causing the depression.   Home care    Ongoing care and support help people manage this disease. Find a healthcare provider and therapist who meet your needs. Seek help when you feel like you may be getting ill.    Be kind to yourself. Make it a point to do things that you enjoy (gardening, walking in nature, going to a movie). Reward yourself for small successes.    Once you start your medicine, expect a slow decrease in your symptoms. Depression will lift gradually, not right away. Ask your healthcare provider how long it will take for the medicine to start working.    Take care of your physical body. Eat a balanced diet (low in saturated fat and high in fruits and vegetables). Exercise at least 3 times a week for 30 minutes. Even mild to moderate exercise (like brisk walking) can make you feel better.    Don't make major decisions, such as a job change, a divorce, or a marriage until you feel better.    Don't drink alcohol. It can make depression worse.    Take medicine as prescribed. Don't stop your medicine or adjust the dose unless you talk with your healthcare provider.    Don't share your medicine or use  someone else's medicine.    Tell all your healthcare providers about all the prescription and over-the-counter medicines, vitamins, and supplements you take. Certain supplements interact with medicines. They can cause dangerous side effects. Ask your pharmacist about medicine interactions when you have questions.    Talk with your family and trusted friends about your feelings and thoughts. Ask them to help you notice behavior changes early. You can then get help and, if needed, your medicine can be adjusted.    Talk with your healthcare provider if you are not getting better. He or she may change your medicine or have you try another treatment.    Follow-up care  Follow up with your healthcare provider as advised.   Call 911  Call 911 if you:     Have suicidal thoughts, a suicide plan, and the means to carry out the plan    Have serious thoughts of hurting someone else    Have trouble breathing    Are very confused    Feel very drowsy or have trouble awakening    Faint or lose consciousness    Have new chest pain that becomes more severe, lasts longer, or spreads into your shoulder, arm, neck, jaw, or back  When to seek medical advice  Call your healthcare provider right away if any of these happen:    Worsening of your symptoms    Feeling extreme depression, fear, anxiety, or anger toward yourself or others    Feeling out of control    Feeling that you may try to harm yourself or another    Hearing voices that others do not hear    Seeing things that others do not see    Not sleeping or eating for 3 days in a row    Having friends or family express concern over your behavior and ask you to seek help  [employer]isha last reviewed this educational content on 7/1/2020 2000-2020 [employer]. All rights reserved. This information is not intended as a substitute for professional medical care. Always follow your healthcare professional's instructions.

## 2021-04-08 NOTE — RESULT ENCOUNTER NOTE
Please Notify Jackie  of test results your CBC was within normal limits indicating normal hemoglobin levels your basic metabolic panel was within normal limits indicating good renal functions and your thyroid was within normal limits will await vitamin levels.    Gwendolyn Adkins CNP

## 2021-04-13 ENCOUNTER — MYC MEDICAL ADVICE (OUTPATIENT)
Dept: FAMILY MEDICINE | Facility: CLINIC | Age: 31
End: 2021-04-13

## 2021-04-13 DIAGNOSIS — E55.9 VITAMIN D DEFICIENCY: Primary | ICD-10-CM

## 2021-04-13 RX ORDER — SWAB
1 SWAB, NON-MEDICATED MISCELLANEOUS DAILY
Qty: 90 CAPSULE | Refills: 0 | Status: SHIPPED | OUTPATIENT
Start: 2021-04-13

## 2021-04-18 ENCOUNTER — HEALTH MAINTENANCE LETTER (OUTPATIENT)
Age: 31
End: 2021-04-18

## 2021-05-04 ENCOUNTER — MYC MEDICAL ADVICE (OUTPATIENT)
Dept: FAMILY MEDICINE | Facility: CLINIC | Age: 31
End: 2021-05-04

## 2021-05-04 DIAGNOSIS — F32.A MILD DEPRESSION: ICD-10-CM

## 2021-05-04 RX ORDER — BUPROPION HYDROCHLORIDE 300 MG/1
300 TABLET ORAL EVERY MORNING
Qty: 90 TABLET | Refills: 1 | Status: SHIPPED | OUTPATIENT
Start: 2021-05-04 | End: 2021-10-26

## 2021-06-30 ENCOUNTER — MYC MEDICAL ADVICE (OUTPATIENT)
Dept: FAMILY MEDICINE | Facility: CLINIC | Age: 31
End: 2021-06-30

## 2021-06-30 DIAGNOSIS — F32.A MILD DEPRESSION: Primary | ICD-10-CM

## 2021-07-05 RX ORDER — BUPROPION HYDROCHLORIDE 150 MG/1
150 TABLET ORAL EVERY MORNING
Qty: 30 TABLET | Refills: 0 | Status: SHIPPED | OUTPATIENT
Start: 2021-07-05 | End: 2021-10-26

## 2021-07-13 ENCOUNTER — MYC MEDICAL ADVICE (OUTPATIENT)
Dept: FAMILY MEDICINE | Facility: CLINIC | Age: 31
End: 2021-07-13

## 2021-07-20 ENCOUNTER — MYC MEDICAL ADVICE (OUTPATIENT)
Dept: FAMILY MEDICINE | Facility: CLINIC | Age: 31
End: 2021-07-20

## 2021-07-21 ENCOUNTER — MYC MEDICAL ADVICE (OUTPATIENT)
Dept: FAMILY MEDICINE | Facility: CLINIC | Age: 31
End: 2021-07-21

## 2021-07-27 ENCOUNTER — MYC MEDICAL ADVICE (OUTPATIENT)
Dept: FAMILY MEDICINE | Facility: CLINIC | Age: 31
End: 2021-07-27

## 2021-10-02 ENCOUNTER — HEALTH MAINTENANCE LETTER (OUTPATIENT)
Age: 31
End: 2021-10-02

## 2021-10-15 ENCOUNTER — VIRTUAL VISIT (OUTPATIENT)
Dept: FAMILY MEDICINE | Facility: CLINIC | Age: 31
End: 2021-10-15
Payer: COMMERCIAL

## 2021-10-15 ENCOUNTER — MYC MEDICAL ADVICE (OUTPATIENT)
Dept: FAMILY MEDICINE | Facility: CLINIC | Age: 31
End: 2021-10-15

## 2021-10-15 DIAGNOSIS — Z53.9 NO SHOW: Primary | ICD-10-CM

## 2021-10-26 ENCOUNTER — VIRTUAL VISIT (OUTPATIENT)
Dept: FAMILY MEDICINE | Facility: CLINIC | Age: 31
End: 2021-10-26
Payer: COMMERCIAL

## 2021-10-26 DIAGNOSIS — L70.0 ACNE VULGARIS: Primary | ICD-10-CM

## 2021-10-26 DIAGNOSIS — F32.A MILD DEPRESSION: ICD-10-CM

## 2021-10-26 PROCEDURE — 99213 OFFICE O/P EST LOW 20 MIN: CPT | Mod: 95 | Performed by: NURSE PRACTITIONER

## 2021-10-26 RX ORDER — BUPROPION HYDROCHLORIDE 300 MG/1
300 TABLET ORAL EVERY MORNING
Qty: 90 TABLET | Refills: 3 | Status: SHIPPED | OUTPATIENT
Start: 2021-10-26

## 2021-10-26 RX ORDER — CLINDAMYCIN PHOSPHATE 10 UG/ML
LOTION TOPICAL 2 TIMES DAILY
Qty: 60 ML | Refills: 1 | Status: SHIPPED | OUTPATIENT
Start: 2021-10-26

## 2021-10-26 ASSESSMENT — ANXIETY QUESTIONNAIRES
GAD7 TOTAL SCORE: 2
7. FEELING AFRAID AS IF SOMETHING AWFUL MIGHT HAPPEN: NOT AT ALL
7. FEELING AFRAID AS IF SOMETHING AWFUL MIGHT HAPPEN: NOT AT ALL
4. TROUBLE RELAXING: NOT AT ALL
6. BECOMING EASILY ANNOYED OR IRRITABLE: NOT AT ALL
2. NOT BEING ABLE TO STOP OR CONTROL WORRYING: NOT AT ALL
3. WORRYING TOO MUCH ABOUT DIFFERENT THINGS: SEVERAL DAYS
GAD7 TOTAL SCORE: 2
GAD7 TOTAL SCORE: 2
5. BEING SO RESTLESS THAT IT IS HARD TO SIT STILL: NOT AT ALL
1. FEELING NERVOUS, ANXIOUS, OR ON EDGE: SEVERAL DAYS
8. IF YOU CHECKED OFF ANY PROBLEMS, HOW DIFFICULT HAVE THESE MADE IT FOR YOU TO DO YOUR WORK, TAKE CARE OF THINGS AT HOME, OR GET ALONG WITH OTHER PEOPLE?: NOT DIFFICULT AT ALL

## 2021-10-26 ASSESSMENT — PATIENT HEALTH QUESTIONNAIRE - PHQ9
SUM OF ALL RESPONSES TO PHQ QUESTIONS 1-9: 1
SUM OF ALL RESPONSES TO PHQ QUESTIONS 1-9: 1
10. IF YOU CHECKED OFF ANY PROBLEMS, HOW DIFFICULT HAVE THESE PROBLEMS MADE IT FOR YOU TO DO YOUR WORK, TAKE CARE OF THINGS AT HOME, OR GET ALONG WITH OTHER PEOPLE: NOT DIFFICULT AT ALL

## 2021-10-26 NOTE — PROGRESS NOTES
"Jackie is a 31 year old who is being evaluated via a billable video visit.      How would you like to obtain your AVS? {AVS Preference:580021}  If the video visit is dropped, the invitation should be resent by: {video visit invitation:391194}  Will anyone else be joining your video visit? {:572953}  {If patient encounters technical issues they should call [phone] :469827}    Video Start Time: {video visit start/end time for provider to select:526682}    {PROVIDER CHARTING PREFERENCE:607636}    Subjective   Jackie is a 31 year old who presents for the following health issues {ACCOMPANIED BY STATEMENT (Optional):759328}    HPI     Genitourinary - Female  Onset/Duration: ***  Description:   Painful urination (Dysuria): {.:557532::\"no\"}           Frequency: {.:107239::\"no\"}  Blood in urine (Hematuria): {.:091844::\"no\"}  Delay in urine (Hesitency): {.:010674::\"no\"}  Intensity: {.:754846}  Progression of Symptoms:  {.:749597}  Accompanying Signs & Symptoms:  Fever/chills: {.:821279::\"no\"}  Flank pain: {.:171710::\"no\"}  Nausea and vomiting: {.:023012::\"no\"}  Vaginal symptoms: {VAGINAL SYMPTOMS:915159::\"none\"}  Abdominal/Pelvic Pain: {.:144929::\"no\"}  History:   History of frequent UTI s: {.:107330::\"no\"}  History of kidney stones: {.:399055::\"no\"}  Sexually Active: {.:436900::\"no\"}  Possibility of pregnancy: { :918443::\"No\"}  Precipitating or alleviating factors: {NONE DEFAULTED:695758::\"None\"}  Therapies tried and outcome: {UTI sx treat:088907::\" *** \"}      {additonal problems for provider to add (Optional):028660}    Review of Systems   {ROS COMP (Optional):686018}      Objective           Vitals:  No vitals were obtained today due to virtual visit.    Physical Exam   {video visit exam brief selected:250282::\"GENERAL: Healthy, alert and no distress\",\"EYES: Eyes grossly normal to inspection.  No discharge or erythema, or obvious scleral/conjunctival abnormalities.\",\"RESP: No audible wheeze, cough, or visible " "cyanosis.  No visible retractions or increased work of breathing.  \",\"SKIN: Visible skin clear. No significant rash, abnormal pigmentation or lesions.\",\"NEURO: Cranial nerves grossly intact.  Mentation and speech appropriate for age.\",\"PSYCH: Mentation appears normal, affect normal/bright, judgement and insight intact, normal speech and appearance well-groomed.\"}    {Diagnostic Test Results (Optional):375694}    {AMBULATORY ATTESTATION (Optional):297096}        Video-Visit Details    Type of service:  Video Visit    Video End Time:{video visit start/end time for provider to select:093131}    Originating Location (pt. Location): {video visit patient location:105736::\"Home\"}    Distant Location (provider location):  [facility]     Platform used for Video Visit: {Virtual Visit Platforms:334094::\"[place]\"}  "

## 2021-10-26 NOTE — PROGRESS NOTES
Jackie is a 31 year old who is being evaluated via a billable video visit.      How would you like to obtain your AVS? MyChart  If the video visit is dropped, the invitation should be resent by: Text to cell phone: [phone]  Will anyone else be joining your video visit? No    Video Start Time: 2:59 PM    Assessment & Plan     (L70.0) Acne vulgaris  (primary encounter diagnosis)  Comment:   Plan: clindamycin (CLEOCIN T) 1 % external lotion            (F32.0) Mild depression (H)  Comment:  Controlled no change in treatment plan  Plan: buPROPion (WELLBUTRIN XL) 300 MG 24 hr tablet            Tobacco Cessation:   reports that she has been smoking cigarettes. She has been smoking about 0.75 packs per day. She has never used smokeless tobacco.      See Patient Instructions    No follow-ups on file.    CADY Iniguez CNP  [facility]    Subjective   Jackie is a 31 year old who presents for the following health issues     HPI     Depression and Anxiety Follow-Up    How are you doing with your depression since your last visit? Improved     How are you doing with your anxiety since your last visit?  Improved     Are you having other symptoms that might be associated with depression or anxiety? No    Have you had a significant life event? No     Do you have any concerns with your use of alcohol or other drugs? No    Social History     Tobacco Use     Smoking status: Current Every Day Smoker     Packs/day: 0.75     Types: Cigarettes     Smokeless tobacco: Never Used     Tobacco comment: is trying to quit   Substance Use Topics     Alcohol use: Yes     Drug use: No     PHQ 3/13/2020 4/6/2021 10/26/2021   PHQ-9 Total Score 4 10 1   Q9: Thoughts of better off dead/self-harm past 2 weeks Not at all Not at all Not at all     FIDELIA-7 SCORE 1/9/2020 4/6/2021 10/26/2021   Total Score 8 (mild anxiety) 11 (moderate anxiety) 2 (minimal anxiety)   Total Score 8 11 2     Last PHQ-9 10/26/2021   1.   Little interest or pleasure in doing things 0   2.  Feeling down, depressed, or hopeless 0   3.  Trouble falling or staying asleep, or sleeping too much 0   4.  Feeling tired or having little energy 1   5.  Poor appetite or overeating 0   6.  Feeling bad about yourself 0   7.  Trouble concentrating 0   8.  Moving slowly or restless 0   Q9: Thoughts of better off dead/self-harm past 2 weeks 0   PHQ-9 Total Score 1   Difficulty at work, home, or with people -       Suicide Assessment Five-step Evaluation and Treatment (SAFE-T)        Review of Systems   Constitutional, HEENT, cardiovascular, pulmonary, gi and gu systems are negative, except as otherwise noted.      Objective           Vitals:  No vitals were obtained today due to virtual visit.    Physical Exam   GENERAL: Healthy, alert and no distress  EYES: Eyes grossly normal to inspection.  No discharge or erythema, or obvious scleral/conjunctival abnormalities.  RESP: No audible wheeze, cough, or visible cyanosis.  No visible retractions or increased work of breathing.    SKIN: Visible skin clear. No significant rash, abnormal pigmentation or lesions.  NEURO: Cranial nerves grossly intact.  Mentation and speech appropriate for age.  PSYCH: Mentation appears normal, affect normal/bright, judgement and insight intact, normal speech and appearance well-groomed.    No results found for any visits on 10/26/21.            Video-Visit Details    Type of service:  Video Visit    Video End Time:3:14 PM    Originating Location (pt. Location): Home    Distant Location (provider location):  [facility]     Platform used for Video Visit: Fely

## 2021-10-27 ASSESSMENT — ANXIETY QUESTIONNAIRES: GAD7 TOTAL SCORE: 2

## 2021-11-18 ENCOUNTER — MYC MEDICAL ADVICE (OUTPATIENT)
Dept: OBGYN | Facility: CLINIC | Age: 31
End: 2021-11-18
Payer: COMMERCIAL

## 2021-11-19 ENCOUNTER — OFFICE VISIT (OUTPATIENT)
Dept: OBGYN | Facility: CLINIC | Age: 31
End: 2021-11-19
Payer: COMMERCIAL

## 2021-11-19 VITALS
BODY MASS INDEX: 19.73 KG/M2 | HEART RATE: 86 BPM | DIASTOLIC BLOOD PRESSURE: 68 MMHG | TEMPERATURE: 97.7 F | WEIGHT: 126 LBS | SYSTOLIC BLOOD PRESSURE: 125 MMHG

## 2021-11-19 DIAGNOSIS — Z30.433 ENCOUNTER FOR REMOVAL AND REINSERTION OF INTRAUTERINE CONTRACEPTIVE DEVICE: ICD-10-CM

## 2021-11-19 DIAGNOSIS — Z30.433 ENCOUNTER FOR REMOVAL AND REINSERTION OF INTRAUTERINE CONTRACEPTIVE DEVICE (IUD): Primary | ICD-10-CM

## 2021-11-19 PROCEDURE — G0145 SCR C/V CYTO,THINLAYER,RESCR: HCPCS | Performed by: OBSTETRICS & GYNECOLOGY

## 2021-11-19 PROCEDURE — 87624 HPV HI-RISK TYP POOLED RSLT: CPT | Performed by: OBSTETRICS & GYNECOLOGY

## 2021-11-19 PROCEDURE — 58301 REMOVE INTRAUTERINE DEVICE: CPT | Performed by: OBSTETRICS & GYNECOLOGY

## 2021-11-19 PROCEDURE — 58300 INSERT INTRAUTERINE DEVICE: CPT | Performed by: OBSTETRICS & GYNECOLOGY

## 2021-11-19 NOTE — NURSING NOTE
"Initial /68 (BP Location: Left arm, Patient Position: Chair, Cuff Size: Adult Regular)   Pulse 86   Temp 97.7  F (36.5  C) (Tympanic)   Wt 57.2 kg (126 lb)   Breastfeeding No   BMI 19.73 kg/m   Estimated body mass index is 19.73 kg/m  as calculated from the following:    Height as of 4/8/21: 1.702 m (5' 7\").    Weight as of this encounter: 57.2 kg (126 lb). .    Lesa Hoover MA    "

## 2021-11-19 NOTE — PROGRESS NOTES
IUD Removal and Replacement Procedure Note    Jackie Quispe  1990  [id number]    The patient was counseled on the risks (including risk of infection, uterine perforation, cramping), benefits (high efficacy, low maintenance birth control, reduced risk of uterine cancer and hyperplasia, improvement in menstrual bleeding), and alternatives of the procedure. Previous Mirena IUD was placed on 5 years ago. Desires to continue this method. Verbal and written consent were obtained.    Technique: The patient was placed in the dorsal lithotomy position.  A speculum was placed in the vagina and the cervix with IUD strings were visualized, grasped with a ring forcep and removed intact. The IUD was placed in a sterile cup and disposed in hazardous waste. The Mirena IUD was loaded, advanced to the fundus, pulled back 1cm, deployed and advanced to the fundus. Using the insertor as a sound, the uterus sounded to 6 cm. IUD strings were cut 3cm from the external cervical os. The patient tolerated the procedure well and there were no complications. EBL: 0cc.     Amarilis Sutherland MD  OB/GYN

## 2021-11-23 LAB
BKR LAB AP GYN ADEQUACY: NORMAL
BKR LAB AP GYN INTERPRETATION: NORMAL
BKR LAB AP HPV REFLEX: NORMAL
BKR LAB AP LMP: NORMAL
BKR LAB AP PREVIOUS ABNORMAL: NORMAL
PATH REPORT.COMMENTS IMP SPEC: NORMAL
PATH REPORT.COMMENTS IMP SPEC: NORMAL
PATH REPORT.RELEVANT HX SPEC: NORMAL

## 2021-11-26 ENCOUNTER — PATIENT OUTREACH (OUTPATIENT)
Dept: OBGYN | Facility: CLINIC | Age: 31
End: 2021-11-26
Payer: COMMERCIAL

## 2021-11-26 DIAGNOSIS — R87.810 CERVICAL HIGH RISK HPV (HUMAN PAPILLOMAVIRUS) TEST POSITIVE: ICD-10-CM

## 2021-11-26 LAB
HUMAN PAPILLOMA VIRUS 16 DNA: POSITIVE
HUMAN PAPILLOMA VIRUS 18 DNA: NEGATIVE
HUMAN PAPILLOMA VIRUS FINAL DIAGNOSIS: ABNORMAL
HUMAN PAPILLOMA VIRUS OTHER HR: NEGATIVE

## 2021-12-03 ENCOUNTER — OFFICE VISIT (OUTPATIENT)
Dept: OBGYN | Facility: CLINIC | Age: 31
End: 2021-12-03
Payer: COMMERCIAL

## 2021-12-03 VITALS
BODY MASS INDEX: 19.93 KG/M2 | DIASTOLIC BLOOD PRESSURE: 69 MMHG | HEART RATE: 103 BPM | WEIGHT: 127 LBS | TEMPERATURE: 98.5 F | HEIGHT: 67 IN | RESPIRATION RATE: 16 BRPM | SYSTOLIC BLOOD PRESSURE: 120 MMHG

## 2021-12-03 DIAGNOSIS — B96.89 BV (BACTERIAL VAGINOSIS): Primary | ICD-10-CM

## 2021-12-03 DIAGNOSIS — R87.810 CERVICAL HIGH RISK HPV (HUMAN PAPILLOMAVIRUS) TEST POSITIVE: Primary | ICD-10-CM

## 2021-12-03 DIAGNOSIS — N76.0 BV (BACTERIAL VAGINOSIS): Primary | ICD-10-CM

## 2021-12-03 DIAGNOSIS — Z71.6 ENCOUNTER FOR SMOKING CESSATION COUNSELING: ICD-10-CM

## 2021-12-03 DIAGNOSIS — L91.8 SKIN TAG: ICD-10-CM

## 2021-12-03 DIAGNOSIS — N89.8 VAGINAL DISCHARGE: ICD-10-CM

## 2021-12-03 LAB
CLUE CELLS: PRESENT
TRICHOMONAS, WET PREP: ABNORMAL
WBC'S/HIGH POWER FIELD, WET PREP: ABNORMAL
YEAST, WET PREP: ABNORMAL

## 2021-12-03 PROCEDURE — 11200 RMVL SKIN TAGS UP TO&INC 15: CPT | Performed by: OBSTETRICS & GYNECOLOGY

## 2021-12-03 PROCEDURE — 57456 ENDOCERV CURETTAGE W/SCOPE: CPT | Performed by: OBSTETRICS & GYNECOLOGY

## 2021-12-03 PROCEDURE — 87210 SMEAR WET MOUNT SALINE/INK: CPT | Performed by: OBSTETRICS & GYNECOLOGY

## 2021-12-03 PROCEDURE — 88305 TISSUE EXAM BY PATHOLOGIST: CPT | Performed by: PATHOLOGY

## 2021-12-03 PROCEDURE — 99213 OFFICE O/P EST LOW 20 MIN: CPT | Mod: 25 | Performed by: OBSTETRICS & GYNECOLOGY

## 2021-12-03 RX ORDER — METRONIDAZOLE 7.5 MG/G
1 GEL VAGINAL DAILY
Qty: 70 G | Refills: 0 | Status: SHIPPED | OUTPATIENT
Start: 2021-12-03 | End: 2021-12-08

## 2021-12-03 ASSESSMENT — MIFFLIN-ST. JEOR: SCORE: 1318.7

## 2021-12-03 NOTE — PROGRESS NOTES
Jackie presents for colposcopy.    Additional concerns:   1. Possible BV.  Notes itchy smelly discharge  2. Labial lesions.  One on her right feel like an old ingrown hair and maybe a skin tag on the left.   3. Smoking cessation: trying to quit and is on wellbutrin.  Not successful yet    Pap hx:  2012, 2014, 2018 All NIL paps  11/19/21 NIL pap, + HR HPV # 16.     PMH/PSH/Meds/Allergies reviewed & documented in [facility]  Procedure for colposcopy and biopsy has been explained to the   patient and consent obtained.    PROCEDURE:  Speculum placed in vagina and excellent visualization of cervix achieved, cervix swabbed x 3 with acetic acid solution.    FINDINGS:  Cervix: no visible lesions; SCJ visualized 360 degrees without lesions, endocervical curettage performed and specimen labelled and sent to pathology.    Vaginal inspection: normal without visible lesions.    Vulvar colposcopy: vulvar colposcopy not performed.  There is a 6 mm inclusion cyst of her right labia majora.  Nontender.  Offered I&D versus removal.  Patient declined both.      Skin tag noted on her left medial thigh close to the inguinal fold.  Has been rubbing and irritating when shaving.  Would like removed.  The base of the lesion was infiltrated with a small amount of 1% lidocaine, elevated and excised with iris scissors.  Patient tolerated well with minimal bleeding.  Bandage applied given location for prevention of chafing while healing.     Procedure Summary: Patient tolerated procedure well and colposcopy adequate.    Colposcopic Impression: benign    Plan: Specimens labelled and sent to pathology., Will base further treatment on pathology findings. and treatment options discussed with patient.    BV: await wet prep results (prefers vaginal treatment)  Smoking cessation: will order quit plan referral.     Tammy Beth M.D.

## 2021-12-03 NOTE — NURSING NOTE
"Initial /69 (BP Location: Right arm, Patient Position: Chair, Cuff Size: Adult Regular)   Pulse 103   Temp 98.5  F (36.9  C) (Tympanic)   Resp 16   Ht 1.702 m (5' 7\")   Wt 57.6 kg (127 lb)   Breastfeeding No   BMI 19.89 kg/m   Estimated body mass index is 19.89 kg/m  as calculated from the following:    Height as of this encounter: 1.702 m (5' 7\").    Weight as of this encounter: 57.6 kg (127 lb). .    Christy Burks CMA    "

## 2021-12-07 LAB
PATH REPORT.COMMENTS IMP SPEC: NORMAL
PATH REPORT.COMMENTS IMP SPEC: NORMAL
PATH REPORT.FINAL DX SPEC: NORMAL
PATH REPORT.GROSS SPEC: NORMAL
PATH REPORT.MICROSCOPIC SPEC OTHER STN: NORMAL
PATH REPORT.RELEVANT HX SPEC: NORMAL
PHOTO IMAGE: NORMAL

## 2021-12-08 ENCOUNTER — PATIENT OUTREACH (OUTPATIENT)
Dept: OBGYN | Facility: CLINIC | Age: 31
End: 2021-12-08
Payer: COMMERCIAL

## 2021-12-08 DIAGNOSIS — R87.810 CERVICAL HIGH RISK HPV (HUMAN PAPILLOMAVIRUS) TEST POSITIVE: ICD-10-CM

## 2022-04-21 ENCOUNTER — OFFICE VISIT (OUTPATIENT)
Dept: FAMILY MEDICINE | Facility: CLINIC | Age: 32
End: 2022-04-21
Payer: COMMERCIAL

## 2022-04-21 VITALS
DIASTOLIC BLOOD PRESSURE: 68 MMHG | SYSTOLIC BLOOD PRESSURE: 102 MMHG | TEMPERATURE: 97.6 F | HEIGHT: 67 IN | OXYGEN SATURATION: 100 % | HEART RATE: 96 BPM | BODY MASS INDEX: 19.59 KG/M2 | WEIGHT: 124.8 LBS

## 2022-04-21 DIAGNOSIS — N63.0 LUMP OR MASS IN BREAST: Primary | ICD-10-CM

## 2022-04-21 PROCEDURE — 99213 OFFICE O/P EST LOW 20 MIN: CPT | Performed by: FAMILY MEDICINE

## 2022-04-21 ASSESSMENT — PATIENT HEALTH QUESTIONNAIRE - PHQ9
SUM OF ALL RESPONSES TO PHQ QUESTIONS 1-9: 1
10. IF YOU CHECKED OFF ANY PROBLEMS, HOW DIFFICULT HAVE THESE PROBLEMS MADE IT FOR YOU TO DO YOUR WORK, TAKE CARE OF THINGS AT HOME, OR GET ALONG WITH OTHER PEOPLE: NOT DIFFICULT AT ALL
SUM OF ALL RESPONSES TO PHQ QUESTIONS 1-9: 1

## 2022-04-21 ASSESSMENT — ANXIETY QUESTIONNAIRES
7. FEELING AFRAID AS IF SOMETHING AWFUL MIGHT HAPPEN: NOT AT ALL
7. FEELING AFRAID AS IF SOMETHING AWFUL MIGHT HAPPEN: NOT AT ALL
5. BEING SO RESTLESS THAT IT IS HARD TO SIT STILL: NOT AT ALL
4. TROUBLE RELAXING: NOT AT ALL
GAD7 TOTAL SCORE: 0
3. WORRYING TOO MUCH ABOUT DIFFERENT THINGS: NOT AT ALL
1. FEELING NERVOUS, ANXIOUS, OR ON EDGE: NOT AT ALL
GAD7 TOTAL SCORE: 0
2. NOT BEING ABLE TO STOP OR CONTROL WORRYING: NOT AT ALL
GAD7 TOTAL SCORE: 0
6. BECOMING EASILY ANNOYED OR IRRITABLE: NOT AT ALL

## 2022-04-21 ASSESSMENT — PAIN SCALES - GENERAL: PAINLEVEL: NO PAIN (0)

## 2022-04-21 NOTE — PROGRESS NOTES
"  Assessment & Plan     Lump or mass in breast  R upper breast mass which recently had increase in size and pain 1 to 2 weeks prior to most recent menses.  She did have this examined in 2019 with a mammogram and ultrasound both of which were negative. She is working a new job which requires more upper extremity effort and wonders if this is contributing. No first-degree relatives with breast cancer though does have distant family history.  No discharge, overlying irritation, erythema, peau d'orange.  Likely benign adenoma/fibrocystic changes but will image d/t return of symptoms.   - US Breast Right Complete 4 Quadrants; Future  - MA Diagnostic Digital Bilateral; Future      Tobacco Cessation:   reports that she has been smoking cigarettes. She has been smoking about 0.75 packs per day. She has never used smokeless tobacco.      FREDERICK Quispe [facility] MS3 04/21/22 8:18 AM        I have reviewed today's vital signs, notes, medications, labs and imaging. I have also seen and examined the patient and agree with the findings and plan as outlined above.      Tru Savage MD  [facility]    Trip Mejia is a 32 year old who presents for the following health issues     HPI     Concern - Mass in [place] area   Onset: several weeks  Description: tender area  Intensity: mild  Progression of Symptoms:  same  Accompanying Signs & Symptoms: discomfort  Previous history of similar problem: yes years ago  Precipitating factors:        Worsened by: motion   Alleviating factors:        Improved by:   Therapies tried and outcome:  none         Review of Systems   Constitutional, HEENT, cardiovascular, pulmonary, gi and gu systems are negative, except as otherwise noted.      Objective    /68   Pulse 96   Temp 97.6  F (36.4  C) (Tympanic)   Ht 1.702 m (5' 7\")   Wt 56.6 kg (124 lb 12.8 oz)   SpO2 100%   BMI 19.55 kg/m    Body mass index is 19.55 kg/m .  Physical Exam   GENERAL: " healthy, alert and no distress  EYES: Eyes grossly normal to inspection, PERRL and conjunctivae and sclerae normal  RESP: lungs clear to auscultation - no rales, rhonchi or wheezes  BREAST: R upper lateral quadrant deep ~1cm firm/rubbery lesion without tenderness or nipple discharge and no palpable axillary masses or adenopathy on L  CV: regular rate and rhythm, normal S1 S2, no S3 or S4, no murmur, click or rub, no peripheral edema and peripheral pulses strong  MS: no gross musculoskeletal defects noted, no edema  SKIN: no suspicious lesions or rashes  NEURO: Normal strength and tone, mentation intact and speech normal  PSYCH: mentation appears normal, affect normal/bright

## 2022-04-21 NOTE — NURSING NOTE
"Chief Complaint   Patient presents with     Breast Mass       Initial /68   Pulse 96   Temp 97.6  F (36.4  C) (Tympanic)   Ht 1.702 m (5' 7\")   Wt 56.6 kg (124 lb 12.8 oz)   SpO2 100%   BMI 19.55 kg/m   Estimated body mass index is 19.55 kg/m  as calculated from the following:    Height as of this encounter: 1.702 m (5' 7\").    Weight as of this encounter: 56.6 kg (124 lb 12.8 oz).    Patient presents to the clinic using     Health Maintenance that is potentially due pending provider review:          Is there anyone who you would like to be able to receive your results? If yes have patient fill out JESSICA    "

## 2022-04-22 ASSESSMENT — PATIENT HEALTH QUESTIONNAIRE - PHQ9: SUM OF ALL RESPONSES TO PHQ QUESTIONS 1-9: 1

## 2022-04-22 ASSESSMENT — ANXIETY QUESTIONNAIRES: GAD7 TOTAL SCORE: 0

## 2022-06-17 ENCOUNTER — HOSPITAL ENCOUNTER (OUTPATIENT)
Dept: MAMMOGRAPHY | Facility: CLINIC | Age: 32
Discharge: HOME | End: 2022-06-17
Attending: FAMILY MEDICINE
Payer: COMMERCIAL

## 2022-06-17 ENCOUNTER — HOSPITAL ENCOUNTER (OUTPATIENT)
Dept: ULTRASOUND IMAGING | Facility: CLINIC | Age: 32
Discharge: HOME | End: 2022-06-17
Attending: FAMILY MEDICINE
Payer: COMMERCIAL

## 2022-06-17 DIAGNOSIS — N63.0 LUMP OR MASS IN BREAST: ICD-10-CM

## 2022-06-17 DIAGNOSIS — N63.11 BREAST LUMP ON RIGHT SIDE AT 10 O'CLOCK POSITION: ICD-10-CM

## 2022-06-17 PROCEDURE — 76642 ULTRASOUND BREAST LIMITED: CPT | Mod: RT

## 2022-06-17 PROCEDURE — 77066 DX MAMMO INCL CAD BI: CPT

## 2022-07-18 NOTE — PROGRESS NOTES
SUBJECTIVE:   Jackie Quispe is a 28 year old female who presents to clinic today for the following health issues:    STD testing request         Description  Patient wants reassurance she has no STD's as she is getting back with her ex, no symptoms     History  Sexually active: not at present  Possibility of pregnancy: No  Recent antibiotic use: no       Mirena IUD  Smoker    PCP   Ernestine Campos [phone]    Health Maintenance      There are no preventive care reminders to display for this patient.    HPI        Patient Active Problem List   Diagnosis     Smoker     Acne     Dysfunctional uterine bleeding     FIDELIA (generalized anxiety disorder)     CARDIOVASCULAR SCREENING; LDL GOAL LESS THAN 160     Mirena IUD- remove by 3/2022     Rib pain     Current Outpatient Medications   Medication     levonorgestrel (MIRENA) 20 MCG/24HR IUD     sertraline (ZOLOFT) 25 MG tablet     doxycycline monohydrate (MONODOX) 100 MG capsule     No current facility-administered medications for this visit.        Reviewed and updated:  Tobacco  Allergies  Meds  Med Hx  Surg Hx  Fam Hx  Soc Hx     ROS:  Constitutional, HEENT, cardiovascular, pulmonary, gi and gu systems are negative, except as otherwise noted.    PHYSICAL EXAM   /70   Pulse 98   Temp 97.9  F (36.6  C) (Tympanic)   Wt 54.9 kg (121 lb)   SpO2 98%   BMI 18.95 kg/m    Body mass index is 18.95 kg/m .  GENERAL APPEARANCE: healthy, alert and no distress  RESP: lungs clear to auscultation - no rales, rhonchi or wheezes  CV: regular rates and rhythm, normal S1 S2, no S3 or S4 and no murmur, click or rub  MS: extremities normal- no gross deformities noted  PSYCH: mentation appears normal and affect normal/bright    ASSESSMENT & PLAN   Unfortunately there was confusion, patient left without getting labs drawn. Staff called her and requested she return before 3:45, or schedule a lab only appointment at any  Received response from Dr. Hamlin:     Jun Hamlin MD Hair, Ashley W RN  Caller: Unspecified (2 weeks ago)  I doubt the right leg intervention will make any difference either. I would favor canceling the procedure. Give her a call and let her know my recommendation.     Called pt and reviewed Dr. Hamlin's recommendations. Pt stated she wants to keep the RLE procedure as scheduled. Pt stated she feels very strongly about keeping the procedure as scheduled and is also wondering if vasodilators would be beneficial for her. Pt stated she understands her pain symptoms may not be related to PAD but if she has blockages/narrowing she wants them to be fixed. Advised will update Dr. Hamlin.     Addendum 7/22/22 - received return call from pt, pt wondering if Dr. Hamlin gave a recommendation about her vasodilator question. Advised waiting for Dr. Hamlin to review. Pt is now scheduled for follow up with Dr. Hamlin on 8/3/22 to discuss upcoming procedure.    [facility].  1. Screen for STD (sexually transmitted disease)  Screening  - NEISSERIA GONORRHOEA PCR  - CHLAMYDIA TRACHOMATIS PCR  - Hepatitis B Surface Antibody  - Hepatitis C antibody  - HIV Antigen Antibody Combo  - Wet prep  Results for orders placed or performed in visit on 01/25/19   Wet prep   Result Value Ref Range    Specimen Description Vagina     Wet Prep No Trichomonas seen     Wet Prep No clue cells seen     Wet Prep No yeast seen          Risks, benefits, side effects and rationale for treatment plan fully discussed with the patient and understanding expressed.    ROBERT [facility]

## 2022-09-03 ENCOUNTER — HEALTH MAINTENANCE LETTER (OUTPATIENT)
Age: 32
End: 2022-09-03

## 2022-09-05 ENCOUNTER — MYC MEDICAL ADVICE (OUTPATIENT)
Dept: FAMILY MEDICINE | Facility: CLINIC | Age: 32
End: 2022-09-05

## 2022-09-05 DIAGNOSIS — L70.0 ACNE VULGARIS: Primary | ICD-10-CM

## 2022-11-16 ENCOUNTER — PATIENT OUTREACH (OUTPATIENT)
Dept: FAMILY MEDICINE | Facility: CLINIC | Age: 32
End: 2022-11-16

## 2022-11-16 DIAGNOSIS — R87.810 CERVICAL HIGH RISK HPV (HUMAN PAPILLOMAVIRUS) TEST POSITIVE: ICD-10-CM

## 2023-01-10 PROBLEM — R87.810 CERVICAL HIGH RISK HPV (HUMAN PAPILLOMAVIRUS) TEST POSITIVE: Status: ACTIVE | Noted: 2021-11-19

## 2023-01-15 ENCOUNTER — HEALTH MAINTENANCE LETTER (OUTPATIENT)
Age: 33
End: 2023-01-15

## 2023-02-24 NOTE — TELEPHONE ENCOUNTER
FYI to provider - Patient is lost to pap tracking follow-up. Attempts to contact pt have been made per reminder process and there has been no reply and/or no appt scheduled. Contact hx listed below.     2012, 2014, 2018 All NIL paps  11/19/21 NIL pap, + HR HPV 16. Plan: [place] bef 2/19/22  12/3/21 [place] ECC neg. Plan: cotest in 1 year  11/16/22 Reminder mychart  12/13/22 Reminder call. Left msg  2/3/23 Appt -- cancelled  2/24/23 Lost to follow-up for pap tracking     Aury Barrios RN BSN, Pap Tracking

## 2023-09-29 ENCOUNTER — OFFICE VISIT (OUTPATIENT)
Dept: OBGYN | Facility: CLINIC | Age: 33
End: 2023-09-29
Payer: COMMERCIAL

## 2023-09-29 VITALS
WEIGHT: 130 LBS | RESPIRATION RATE: 18 BRPM | HEIGHT: 67 IN | DIASTOLIC BLOOD PRESSURE: 70 MMHG | TEMPERATURE: 97.9 F | HEART RATE: 87 BPM | BODY MASS INDEX: 20.4 KG/M2 | SYSTOLIC BLOOD PRESSURE: 113 MMHG

## 2023-09-29 DIAGNOSIS — Z97.5 BREAKTHROUGH BLEEDING ASSOCIATED WITH INTRAUTERINE DEVICE (IUD): ICD-10-CM

## 2023-09-29 DIAGNOSIS — N92.1 BREAKTHROUGH BLEEDING ASSOCIATED WITH INTRAUTERINE DEVICE (IUD): ICD-10-CM

## 2023-09-29 DIAGNOSIS — Z01.419 WOMEN'S ANNUAL ROUTINE GYNECOLOGICAL EXAMINATION: ICD-10-CM

## 2023-09-29 DIAGNOSIS — R87.810 CERVICAL HIGH RISK HPV (HUMAN PAPILLOMAVIRUS) TEST POSITIVE: Primary | ICD-10-CM

## 2023-09-29 LAB
CHOLEST SERPL-MCNC: 167 MG/DL
HBA1C MFR BLD: 5.4 % (ref 0–5.6)
HCG INTACT+B SERPL-ACNC: <1 MIU/ML
HDLC SERPL-MCNC: 97 MG/DL
LDLC SERPL CALC-MCNC: 60 MG/DL
NONHDLC SERPL-MCNC: 70 MG/DL
TRIGL SERPL-MCNC: 51 MG/DL
TSH SERPL DL<=0.005 MIU/L-ACNC: 0.64 UIU/ML (ref 0.3–4.2)

## 2023-09-29 PROCEDURE — 83036 HEMOGLOBIN GLYCOSYLATED A1C: CPT | Performed by: OBSTETRICS & GYNECOLOGY

## 2023-09-29 PROCEDURE — 84443 ASSAY THYROID STIM HORMONE: CPT | Performed by: OBSTETRICS & GYNECOLOGY

## 2023-09-29 PROCEDURE — 88175 CYTOPATH C/V AUTO FLUID REDO: CPT | Performed by: OBSTETRICS & GYNECOLOGY

## 2023-09-29 PROCEDURE — 99213 OFFICE O/P EST LOW 20 MIN: CPT | Mod: 25 | Performed by: OBSTETRICS & GYNECOLOGY

## 2023-09-29 PROCEDURE — 84702 CHORIONIC GONADOTROPIN TEST: CPT | Performed by: OBSTETRICS & GYNECOLOGY

## 2023-09-29 PROCEDURE — 36415 COLL VENOUS BLD VENIPUNCTURE: CPT | Performed by: OBSTETRICS & GYNECOLOGY

## 2023-09-29 PROCEDURE — 87624 HPV HI-RISK TYP POOLED RSLT: CPT | Performed by: OBSTETRICS & GYNECOLOGY

## 2023-09-29 PROCEDURE — 99395 PREV VISIT EST AGE 18-39: CPT | Performed by: OBSTETRICS & GYNECOLOGY

## 2023-09-29 PROCEDURE — 80061 LIPID PANEL: CPT | Performed by: OBSTETRICS & GYNECOLOGY

## 2023-09-29 ASSESSMENT — ENCOUNTER SYMPTOMS
FEVER: 0
MYALGIAS: 0
EYE PAIN: 0
NERVOUS/ANXIOUS: 0
DIARRHEA: 0
PARESTHESIAS: 0
HEMATURIA: 0
FREQUENCY: 1
HEADACHES: 0
DYSURIA: 0
WEAKNESS: 0
JOINT SWELLING: 0
ARTHRALGIAS: 0
DIZZINESS: 0
HEMATOCHEZIA: 0
PALPITATIONS: 0
ABDOMINAL PAIN: 0
NAUSEA: 0
CONSTIPATION: 0
SHORTNESS OF BREATH: 0
HEARTBURN: 0
BREAST MASS: 0
COUGH: 0
CHILLS: 0

## 2023-09-29 ASSESSMENT — PATIENT HEALTH QUESTIONNAIRE - PHQ9: SUM OF ALL RESPONSES TO PHQ QUESTIONS 1-9: 0

## 2023-09-29 NOTE — PROGRESS NOTES
SUBJECTIVE:   CC: Jackie Quispe is an 33 year old woman who presents for preventive health visit.   Also reports a change in the bleeding pattern on her Mirena IUD. Initially, she was getting a monthly period, lasting three days light. Now she can have bleeding up to two weeks. It will bleed one day, stop for a day and then restart. The bleeding is light, but annoying and can persist for up to two weeks.     Patient has been advised of split billing requirements and indicates understanding: Yes  Healthy Habits:    Do you get at least three servings of calcium containing foods daily (dairy, green leafy vegetables, etc.)? yes  Amount of exercise or daily activities, outside of work: no day(s) per week  Problems taking medications regularly No  Medication side effects: No  Have you had an eye exam in the past two years? yes  Do you see a dentist twice per year? yes  Do you have sleep apnea, excessive snoring or daytime drowsiness?no      Today's PHQ-2 Score:       9/29/2023     7:59 AM 4/21/2022     7:32 AM   PHQ-2 ( 1999 [employer])   Q1: Little interest or pleasure in doing things 0 0   Q2: Feeling down, depressed or hopeless 0 0   PHQ-2 Score 0 0   Q1: Little interest or pleasure in doing things Not at all Not at all   Q2: Feeling down, depressed or hopeless Not at all Not at all   PHQ-2 Score 0 0       Abuse: Current or Past(Physical, Sexual or Emotional)- No  Do you feel safe in your environment? Yes        Social History     Tobacco Use    Smoking status: Every Day     Packs/day: 0.75     Types: Cigarettes    Smokeless tobacco: Never    Tobacco comments:     is trying to quit   Substance Use Topics    Alcohol use: Yes     If you drink alcohol do you typically have >3 drinks per day or >7 drinks per week? No                     Reviewed orders with patient.  Reviewed health maintenance and updated orders accordingly - Yes  Lab work is in process    FHS-7:        No data to display              Pertinent  mammograms are reviewed under the imaging tab.    Pertinent mammograms are reviewed under the imaging tab.  History of abnormal Pap smear: YES - updated in Problem List and Health Maintenance accordingly      Latest Ref Rng & Units 2021     3:04 PM 1/10/2018     5:09 PM 11/3/2014    12:00 AM   PAP / HPV   PAP  Negative for Intraepithelial Lesion or Malignancy (NILM)      PAP (Historical)   NIL  NIL    HPV 16 DNA Negative Positive      HPV 18 DNA Negative Negative      Other HR HPV Negative Negative        Reviewed and updated as needed this visit by clinical staff   Tobacco  Allergies  Meds   Med Hx  Surg Hx  Fam Hx  Soc Hx        Reviewed and updated as needed this visit by Provider                 Past Medical History:   Diagnosis Date    Cervical high risk HPV (human papillomavirus) test positive 2021    Chickenpox     Shingles       Past Surgical History:   Procedure Laterality Date    MOUTH SURGERY      wisdom teeth     OB History    Para Term  AB Living   2 2 2 0 0 2   SAB IAB Ectopic Multiple Live Births   0 0 0 0 2      # Outcome Date GA Lbr Alex/2nd Weight Sex Delivery Anes PTL Lv   2 Term 12/04/15 38w3d / 00:05 3.204 kg (7 lb 1 oz) F Vag-Spont EPI N THEE      Name: Olivia      Apgar1: 8  Apgar5: 9   1 Term 13 40w6d  3.39 kg (7 lb 7.6 oz) M Vag-Spont EPI N THEE      Name: Braulio      Apgar1: 8  Apgar5: 9       ROS:  CONSTITUTIONAL: NEGATIVE for fever, chills, change in weight  INTEGUMENTARU/SKIN: NEGATIVE for worrisome rashes, moles or lesions  EYES: NEGATIVE for vision changes or irritation  ENT: NEGATIVE for ear, mouth and throat problems  RESP: NEGATIVE for significant cough or SOB  BREAST: NEGATIVE for masses, tenderness or discharge  CV: NEGATIVE for chest pain, palpitations or peripheral edema  GI: NEGATIVE for nausea, abdominal pain, heartburn, or change in bowel habits  : NEGATIVE for unusual urinary or vaginal symptoms. +Breakthrough bleeding on  "IUD  MUSCULOSKELETAL: NEGATIVE for significant arthralgias or myalgia  NEURO: NEGATIVE for weakness, dizziness or paresthesias  PSYCHIATRIC: NEGATIVE for changes in mood or affect    OBJECTIVE:   /70 (BP Location: Right arm, Patient Position: Chair, Cuff Size: Adult Regular)   Pulse 87   Temp 97.9  F (36.6  C) (Tympanic)   Resp 18   Ht 1.702 m (5' 7\")   Wt 59 kg (130 lb)   BMI 20.36 kg/m    EXAM:  GENERAL: healthy, alert and no distress  EYES: Eyes grossly normal to inspection  RESP: breathing comfortably on room air with no appreciable cough or wheeze  BREAST: normal without masses, tenderness or nipple discharge and no palpable axillary masses or adenopathy  CV: regular rate, warm and well-perfused, normatensive   ABDOMEN: soft, nontender, no hepatosplenomegaly, no masses and bowel sounds normal   (female): normal female external genitalia, normal urethral meatus, vaginal mucosa pink, moist, well rugated, and normal cervix/adnexa/uterus without masses or discharge. IUD strings visualized at the external cervical os.   MS: no gross musculoskeletal defects noted, no edema  SKIN: no suspicious lesions or rashes  NEURO: Normal strength and tone, mentation intact and speech normal  PSYCH: mentation appears normal, affect normal/bright    Diagnostic Test Results:  Labs reviewed in Epic    ASSESSMENT/PLAN:   (R87.810) Cervical high risk HPV (human papillomavirus) test positive  (primary encounter diagnosis)  Comment:   Plan: Pap screen with HPV - recommended age 30 - 65         years          (Z01.419) Women's annual routine gynecological examination  Comment:   Plan: Hemoglobin A1c, Lipid panel reflex to direct         LDL Non-fasting, TSH with free T4 reflex          (N92.1,  Z97.5) Breakthrough bleeding associated with intrauterine device (IUD)  Comment: Will assess for pregnancy, correct IUD location. Could consider early remove/replace as she has done in the past versus consideration of other options. " "  Plan: HCG quantitative pregnancy, US Pelvic Complete         with Transvaginal            Patient has been advised of split billing requirements and indicates understanding: Yes  COUNSELING:   Reviewed preventive health counseling, as reflected in patient instructions  Special attention given to: breast awareness    Estimated body mass index is 20.36 kg/m  as calculated from the following:    Height as of this encounter: 1.702 m (5' 7\").    Weight as of this encounter: 59 kg (130 lb).        She reports that she has been smoking cigarettes. She has been smoking an average of .75 packs per day. She has never used smokeless tobacco.  Nicotine/Tobacco Cessation Plan:   Information offered: Patient not interested at this time      Counseling Resources:  ATP IV Guidelines  Pooled Cohorts Equation Calculator  Breast Cancer Risk Calculator  BRCA-Related Cancer Risk Assessment: FHS-7 Tool  FRAX Risk Assessment  ICSI Preventive Guidelines  Dietary Guidelines for Americans, 2010  USDA's MyPlate  ASA Prophylaxis  Lung CA Screening    Amarilis Sutherland MD  [facility]   " 09-Jan-2023

## 2023-10-03 LAB
BKR LAB AP GYN ADEQUACY: NORMAL
BKR LAB AP GYN INTERPRETATION: NORMAL
BKR LAB AP HPV REFLEX: NORMAL
BKR LAB AP PREVIOUS ABNL DX: NORMAL
BKR LAB AP PREVIOUS ABNORMAL: NORMAL
PATH REPORT.COMMENTS IMP SPEC: NORMAL
PATH REPORT.COMMENTS IMP SPEC: NORMAL
PATH REPORT.RELEVANT HX SPEC: NORMAL

## 2023-10-06 LAB
HUMAN PAPILLOMA VIRUS 16 DNA: NEGATIVE
HUMAN PAPILLOMA VIRUS 18 DNA: NEGATIVE
HUMAN PAPILLOMA VIRUS FINAL DIAGNOSIS: NORMAL
HUMAN PAPILLOMA VIRUS OTHER HR: NEGATIVE

## 2024-04-17 NOTE — TELEPHONE ENCOUNTER
-Ice on and off for the next 24 hours if injection sites are sore. Do gentle range of motion exercises in each area that was injected. Try to do them every hour for about half a minute or so, in every direction that the affected part goes. No pool, bath, or hot tub today. Avoid heavy lifting for the next 2 days.    -Continue medications for now, but consider going up on baclofen.   -Given kidney disease, it is better to take gabapentin 600 mg twice a day  -Consider other medications in the future including cymbalta  -Consider injections: ultrasound-guided hip joint injections, referral for spine injections  -Referral provided to gynecology last time, this is important.  -PT referral provided. This is important  -Do daily exercises in your room, moving your legs and hips around is much as possible  -Follow-up 6-8 weeks   See if patient will schedule a telephone visit. April ELMORE

## 2024-05-10 NOTE — PATIENT INSTRUCTIONS
1. Screen for STD (sexually transmitted disease)  Screening  - NEISSERIA GONORRHOEA PCR  - CHLAMYDIA TRACHOMATIS PCR  - Hepatitis B Surface Antibody  - Hepatitis C antibody  - HIV Antigen Antibody Combo  - Wet prep  Results for orders placed or performed in visit on 01/25/19   Wet prep   Result Value Ref Range    Specimen Description Vagina     Wet Prep No Trichomonas seen     Wet Prep No clue cells seen     Wet Prep No yeast seen         Abdominal pain

## 2024-05-18 NOTE — PROGRESS NOTES
Chief Complaint:     Chief Complaint   Patient presents with     Cough     treatment on 5/30/2018 with prednisone and tessalon-also pink eye and used drops and the eye and cough are not better       HPI: Jackie Quispe is an 28 year old female who presents with a cough.  Patient states that she has had a cough and some rib pain for roughly 2 months now.  Recent XR was unremarkable.  She was started on Prednisone and this did help some.  She also is complaining of pink eye.  Her son has similar.  She was started on Polytrim drops and this has not helped.  She does have seasonal allergies she thinks, but has not taken anything for this.       Associated symptoms: sore throat, congestion and sinus pressure.    Recent travel?  no.      ROS:     Review of Systems   Constitutional: Negative for chills, fatigue and fever.   HENT: Negative for congestion, ear pain, rhinorrhea, sore throat and trouble swallowing.    Eyes: Negative.  Negative for discharge, redness and itching.   Respiratory: Positive for cough. Negative for chest tightness and shortness of breath.    Cardiovascular: Negative.  Negative for chest pain and palpitations.   Gastrointestinal: Negative for abdominal pain, constipation, diarrhea, nausea and vomiting.   Endocrine: Negative for polydipsia and polyuria.   Genitourinary: Negative for flank pain, frequency, hematuria and urgency.   Musculoskeletal: Negative for myalgias, neck pain and neck stiffness.   Skin: Negative for rash.   Allergic/Immunologic: Negative for immunocompromised state.   Neurological: Negative for dizziness, weakness, light-headedness and headaches.   Hematological: Negative for adenopathy.        Respiratory History  occasional episodes of bronchitis    No pertinent family Hx at this time.  Patient has never smoked.     Family History   Family History   Problem Relation Age of Onset     Alcohol/Drug Maternal Grandmother      alcohol     Hypertension Paternal Grandfather      CANCER  Paternal Grandfather      HEART DISEASE Paternal Grandfather      CANCER Maternal Grandfather      lung     Alzheimer Disease Paternal Grandmother      Family History Negative Mother      Cardiovascular Father 54     MI     HEART DISEASE Father         Problem history  Patient Active Problem List   Diagnosis     Smoker     Acne     Dysfunctional uterine bleeding     FIDELIA (generalized anxiety disorder)     CARDIOVASCULAR SCREENING; LDL GOAL LESS THAN 160     Mirena IUD- remove by 3/2022     Rib pain        Allergies  No Known Allergies     Social History  Social History     Social History     Marital status: Single     Spouse name: N/A     Number of children: 2     Years of education: N/A     Occupational History     Not on file.     Social History Main Topics     Smoking status: Current Every Day Smoker     Packs/day: 0.50     Types: Cigarettes     Smokeless tobacco: Never Used      Comment: smoking  10 cig/day     Alcohol use Yes      Comment: Occas- quit with pregnacny     Drug use: No     Sexual activity: Yes     Partners: Male     Birth control/ protection: IUD     Other Topics Concern     Parent/Sibling W/ Cabg, Mi Or Angioplasty Before 65f 55m? No     Social History Narrative        Current Meds    Current Outpatient Prescriptions:      benzonatate (TESSALON PERLES) 100 MG capsule, Take 1 capsule (100 mg) by mouth 3 times daily as needed, Disp: 42 capsule, Rfl: 0     doxycycline monohydrate 100 MG capsule, Take 1 capsule (100 mg) by mouth 2 times daily for 14 days, Disp: 28 capsule, Rfl: 0     fluticasone (FLONASE) 50 MCG/ACT spray, Spray 1-2 sprays into both nostrils daily, Disp: 1 Bottle, Rfl: 1     levonorgestrel (MIRENA) 20 MCG/24HR IUD, 1 each (20 mcg) by Intrauterine route once 7/8/2016, Disp: 1 each, Rfl: 0     neomycin-polymyxin-hydrocortisone (CORTISPORIN) otic solution, Place 3 drops in ear(s) 4 times daily, Disp: , Rfl:      olopatadine HCl (PATADAY) 0.2 % SOLN, Place 1 drop into both eyes daily,  Disp: 2.5 mL, Rfl: 3        OBJECTIVE     Vital signs reviewed by Edmund Kahn  BP (P) 123/66  Pulse (P) 73  Temp (P) 98.4  F (36.9  C) (Tympanic)  Resp (P) 18  Wt (P) 119 lb 3.2 oz (54.1 kg)  SpO2 (P) 98%  BMI (P) 18.81 kg/m2     PEFR:  General appearance: healthy, alert and no distress  Ears: R TM - normal: no effusions, no erythema, and normal landmarks, L TM - normal: no effusions, no erythema, and normal landmarks  Eyes: R conjunctival erythema, EOM's intact and PERRLA, L conjunctival erythema, EOM's intact and PERRLA  Nose: boggy and clear discharge  Oropharynx: mild erythema  Neck: supple and no adenopathy  Lungs: normal and clear to auscultation  Heart: S1, S2 normal, no murmur, click, rub or gallop, regular rate and rhythm  Abdomen: Abdomen soft, non-tender without masses or organomegaly         ASSESSMENT    1. Acute bronchitis with symptoms > 10 days    2. Acute conjunctivitis of both eyes, unspecified acute conjunctivitis type    3. Acute seasonal allergic rhinitis, unspecified trigger        PLAN  With length of symptoms, will start her on Doxycycline today for 14 days.  Rx for Pataday for conjunctivitis.  Rx for Flonase.  Patient instructed to follow up with her PCP next week for reevaluation.  She is currently in no respiratory distress.  She is afebrile with clear lung sounds and O2 sats at 98% on RA.  Rest, Push fluids, vaporizer, elevation of head of bed.  Ibuprofen and or Tylenol for any fever or body aches.  Over the counter cough suppressant- PRN- as discussed.   If symptoms worsen, recheck immediately otherwise follow up with your PCP PRN.  Worrisome symptoms discussed with instructions to go to the ED.  Patient verbalized understanding and agreed with this plan.         Edmund Kahn  6/7/2018, 4:24 PM     Orientation to room/Bed in low position, brakes on/Side rails x 2 or 4 up, assess large gaps, such that a patient could get extremity or other body part entrapped, use additional safety procedures

## 2024-08-30 ENCOUNTER — PATIENT OUTREACH (OUTPATIENT)
Dept: CARE COORDINATION | Facility: CLINIC | Age: 34
End: 2024-08-30

## 2024-09-13 ENCOUNTER — PATIENT OUTREACH (OUTPATIENT)
Dept: CARE COORDINATION | Facility: CLINIC | Age: 34
End: 2024-09-13

## 2024-10-16 ENCOUNTER — OFFICE VISIT (OUTPATIENT)
Dept: FAMILY MEDICINE | Facility: CLINIC | Age: 34
End: 2024-10-16
Payer: COMMERCIAL

## 2024-10-16 ENCOUNTER — TELEPHONE (OUTPATIENT)
Dept: FAMILY MEDICINE | Facility: CLINIC | Age: 34
End: 2024-10-16

## 2024-10-16 VITALS
WEIGHT: 130.4 LBS | OXYGEN SATURATION: 100 % | BODY MASS INDEX: 20.47 KG/M2 | SYSTOLIC BLOOD PRESSURE: 122 MMHG | DIASTOLIC BLOOD PRESSURE: 80 MMHG | RESPIRATION RATE: 22 BRPM | HEART RATE: 99 BPM | TEMPERATURE: 97.8 F | HEIGHT: 67 IN

## 2024-10-16 DIAGNOSIS — R31.0 GROSS HEMATURIA: Primary | ICD-10-CM

## 2024-10-16 DIAGNOSIS — Z83.49 FAMILY HISTORY OF THYROID DISEASE: ICD-10-CM

## 2024-10-16 PROBLEM — J42 CHRONIC BRONCHITIS, UNSPECIFIED CHRONIC BRONCHITIS TYPE (H): Status: RESOLVED | Noted: 2021-04-08 | Resolved: 2024-10-16

## 2024-10-16 LAB
ALBUMIN UR-MCNC: NEGATIVE MG/DL
ANION GAP SERPL CALCULATED.3IONS-SCNC: 15 MMOL/L (ref 7–15)
APPEARANCE UR: CLEAR
BILIRUB UR QL STRIP: NEGATIVE
BUN SERPL-MCNC: 12.8 MG/DL (ref 6–20)
CALCIUM SERPL-MCNC: 9.1 MG/DL (ref 8.8–10.4)
CHLORIDE SERPL-SCNC: 101 MMOL/L (ref 98–107)
COLOR UR AUTO: YELLOW
CREAT SERPL-MCNC: 0.82 MG/DL (ref 0.51–0.95)
EGFRCR SERPLBLD CKD-EPI 2021: >90 ML/MIN/1.73M2
ERYTHROCYTE [DISTWIDTH] IN BLOOD BY AUTOMATED COUNT: 11.7 % (ref 10–15)
GLUCOSE SERPL-MCNC: 101 MG/DL (ref 70–99)
GLUCOSE UR STRIP-MCNC: NEGATIVE MG/DL
HCO3 SERPL-SCNC: 23 MMOL/L (ref 22–29)
HCT VFR BLD AUTO: 39.5 % (ref 35–47)
HGB BLD-MCNC: 12.8 G/DL (ref 11.7–15.7)
HGB UR QL STRIP: NEGATIVE
KETONES UR STRIP-MCNC: NEGATIVE MG/DL
LEUKOCYTE ESTERASE UR QL STRIP: NEGATIVE
MCH RBC QN AUTO: 32.2 PG (ref 26.5–33)
MCHC RBC AUTO-ENTMCNC: 32.4 G/DL (ref 31.5–36.5)
MCV RBC AUTO: 99 FL (ref 78–100)
NITRATE UR QL: NEGATIVE
PH UR STRIP: 6.5 [PH] (ref 5–7)
PLATELET # BLD AUTO: 279 10E3/UL (ref 150–450)
POTASSIUM SERPL-SCNC: 4 MMOL/L (ref 3.4–5.3)
RBC # BLD AUTO: 3.98 10E6/UL (ref 3.8–5.2)
SODIUM SERPL-SCNC: 139 MMOL/L (ref 135–145)
SP GR UR STRIP: 1.01 (ref 1–1.03)
TSH SERPL DL<=0.005 MIU/L-ACNC: 0.56 UIU/ML (ref 0.3–4.2)
UROBILINOGEN UR STRIP-ACNC: 0.2 E.U./DL
WBC # BLD AUTO: 8.7 10E3/UL (ref 4–11)

## 2024-10-16 PROCEDURE — 36415 COLL VENOUS BLD VENIPUNCTURE: CPT | Performed by: FAMILY MEDICINE

## 2024-10-16 PROCEDURE — 81003 URINALYSIS AUTO W/O SCOPE: CPT | Performed by: FAMILY MEDICINE

## 2024-10-16 PROCEDURE — 85027 COMPLETE CBC AUTOMATED: CPT | Performed by: FAMILY MEDICINE

## 2024-10-16 PROCEDURE — 80048 BASIC METABOLIC PNL TOTAL CA: CPT | Performed by: FAMILY MEDICINE

## 2024-10-16 PROCEDURE — 84443 ASSAY THYROID STIM HORMONE: CPT | Performed by: FAMILY MEDICINE

## 2024-10-16 PROCEDURE — 99213 OFFICE O/P EST LOW 20 MIN: CPT | Performed by: FAMILY MEDICINE

## 2024-10-16 ASSESSMENT — ANXIETY QUESTIONNAIRES
1. FEELING NERVOUS, ANXIOUS, OR ON EDGE: SEVERAL DAYS
6. BECOMING EASILY ANNOYED OR IRRITABLE: NOT AT ALL
2. NOT BEING ABLE TO STOP OR CONTROL WORRYING: NOT AT ALL
7. FEELING AFRAID AS IF SOMETHING AWFUL MIGHT HAPPEN: NOT AT ALL
GAD7 TOTAL SCORE: 1
4. TROUBLE RELAXING: NOT AT ALL
8. IF YOU CHECKED OFF ANY PROBLEMS, HOW DIFFICULT HAVE THESE MADE IT FOR YOU TO DO YOUR WORK, TAKE CARE OF THINGS AT HOME, OR GET ALONG WITH OTHER PEOPLE?: NOT DIFFICULT AT ALL
7. FEELING AFRAID AS IF SOMETHING AWFUL MIGHT HAPPEN: NOT AT ALL
IF YOU CHECKED OFF ANY PROBLEMS ON THIS QUESTIONNAIRE, HOW DIFFICULT HAVE THESE PROBLEMS MADE IT FOR YOU TO DO YOUR WORK, TAKE CARE OF THINGS AT HOME, OR GET ALONG WITH OTHER PEOPLE: NOT DIFFICULT AT ALL
3. WORRYING TOO MUCH ABOUT DIFFERENT THINGS: NOT AT ALL
GAD7 TOTAL SCORE: 1
GAD7 TOTAL SCORE: 1
5. BEING SO RESTLESS THAT IT IS HARD TO SIT STILL: NOT AT ALL

## 2024-10-16 ASSESSMENT — PAIN SCALES - GENERAL: PAINLEVEL: NO PAIN (0)

## 2024-10-16 ASSESSMENT — PATIENT HEALTH QUESTIONNAIRE - PHQ9
SUM OF ALL RESPONSES TO PHQ QUESTIONS 1-9: 5
10. IF YOU CHECKED OFF ANY PROBLEMS, HOW DIFFICULT HAVE THESE PROBLEMS MADE IT FOR YOU TO DO YOUR WORK, TAKE CARE OF THINGS AT HOME, OR GET ALONG WITH OTHER PEOPLE: NOT DIFFICULT AT ALL
SUM OF ALL RESPONSES TO PHQ QUESTIONS 1-9: 5

## 2024-10-16 NOTE — TELEPHONE ENCOUNTER
Patient is calling to clinic for help with getting an appointment. She has concerns of decreased energy, hot and cold episodes at night-wakes up sweating, a little diarrhea. She is questioning if needs labs done to check on thyroid. She also reports that a couple of months ago she had problems with her kidneys. She was to go to the Emergency Department and did not go. A few days ago she noticed red tinted urine. Today urine is more pink tinged. She has not checked her temperature as she does not have a thermometer. She denies muscles aches, sore throat, chest pain, shortness of breath and difficultly breathing. She has not tested for covid.  Patient was scheduled clinic appointment today with Janette in [place].    Tammy Bales RN

## 2024-10-16 NOTE — LETTER
October 25, 2024      Jackie Quispe  [address] [address] [facility] [address]        Dear ,    We are writing to inform you of your test results.    Good afternoon Jackie,     Lab results came back unremarkable including normal thyroid function.     Let us know if there are any questions.     Thanks     Dr Savage    Resulted Orders   UA Macroscopic with reflex to Microscopic and Culture - Lab Collect   Result Value Ref Range    Color Urine Yellow Colorless, Straw, Light Yellow, Yellow    Appearance Urine Clear Clear    Glucose Urine Negative Negative mg/dL    Bilirubin Urine Negative Negative    Ketones Urine Negative Negative mg/dL    Specific Gravity Urine 1.010 1.003 - 1.035    Blood Urine Negative Negative    pH Urine 6.5 5.0 - 7.0    Protein Albumin Urine Negative Negative mg/dL    Urobilinogen Urine 0.2 0.2, 1.0 E.U./dL    Nitrite Urine Negative Negative    Leukocyte Esterase Urine Negative Negative    Narrative    Microscopic not indicated   CBC with platelets   Result Value Ref Range    WBC Count 8.7 4.0 - 11.0 10e3/uL    RBC Count 3.98 3.80 - 5.20 10e6/uL    Hemoglobin 12.8 11.7 - 15.7 g/dL    Hematocrit 39.5 35.0 - 47.0 %    MCV 99 78 - 100 fL    MCH 32.2 26.5 - 33.0 pg    MCHC 32.4 31.5 - 36.5 g/dL    RDW 11.7 10.0 - 15.0 %    Platelet Count 279 150 - 450 10e3/uL   Basic metabolic panel  (Ca, Cl, CO2, Creat, Gluc, K, Na, BUN)   Result Value Ref Range    Sodium 139 135 - 145 mmol/L    Potassium 4.0 3.4 - 5.3 mmol/L    Chloride 101 98 - 107 mmol/L    Carbon Dioxide (CO2) 23 22 - 29 mmol/L    Anion Gap 15 7 - 15 mmol/L    Urea Nitrogen 12.8 6.0 - 20.0 mg/dL    Creatinine 0.82 0.51 - 0.95 mg/dL    GFR Estimate >90 >60 mL/min/1.73m2      Comment:      eGFR calculated using 2021 CKD-EPI equation.    Calcium 9.1 8.8 - 10.4 mg/dL      Comment:      Reference intervals for this test were updated on 7/16/2024 to reflect our healthy population more accurately. There may be differences in the flagging of  prior results with similar values performed with this method. Those prior results can be interpreted in the context of the updated reference intervals.    Glucose 101 (H) 70 - 99 mg/dL   TSH with free T4 reflex   Result Value Ref Range    TSH 0.56 0.30 - 4.20 uIU/mL       If you have any questions or concerns, please call the clinic at the number listed above.       Sincerely,      Tru Savage MD

## 2024-10-16 NOTE — PROGRESS NOTES
"  Assessment & Plan     Gross hematuria  Differentials discussed in detail including nephrolithiasis.  Physical examination overall unremarkable.  UA findings negative.  CBC, BMP, CT abdomen/pelvis ordered for further evaluation  - CBC with platelets; Future  - Basic metabolic panel  (Ca, Cl, CO2, Creat, Gluc, K, Na, BUN); Future  - CT Abdomen Pelvis w Contrast; Future    Family history of thyroid disease  Family history of thyroid disease.  TSH ordered  - TSH with free T4 reflex; Future      Subjective   Jackie is a 34 year old, presenting for the following health issues:  UTI        10/16/2024     3:46 PM   Additional Questions   Roomed by Felicitas CAT CMA     History of Present Illness       Reason for visit:  Cold sweats tired. Slight blood in pee. Not feeling 100%  Symptom onset:  1-3 days ago  Symptoms include:  Cold Sweats, Tired, Slight kidney pain, slight blood in pee.  Symptom intensity:  Mild  Symptom progression:  Staying the same  Had these symptoms before:  Yes  Has tried/received treatment for these symptoms:  No  What makes it worse:  No.  What makes it better:  Sleep, lots of water.      -Not sure if she has a stone or if she has a hormone deficiency.  Has family history of thyroid problems. IUD in place      Review of Systems  Constitutional, neuro, ENT, endocrine, pulmonary, cardiac, gastrointestinal, genitourinary, musculoskeletal, integument and psychiatric systems are negative, except as otherwise noted.      Objective    /80 (BP Location: Right arm, Patient Position: Sitting, Cuff Size: Adult Regular)   Pulse 99   Temp 97.8  F (36.6  C) (Tympanic)   Resp 22   Ht 1.702 m (5' 7\")   Wt 59.1 kg (130 lb 6.4 oz)   LMP  (LMP Unknown)   SpO2 100%   BMI 20.42 kg/m    Body mass index is 20.42 kg/m .  Physical Exam   GENERAL: alert and no distress  EYES: Eyes grossly normal to inspection, PERRL and conjunctivae and sclerae normal  HENT: normal cephalic/atraumatic, nose and mouth without " ulcers or lesions, oropharynx clear, and oral mucous membranes moist  NECK: no adenopathy, no asymmetry, masses, or scars  RESP: lungs clear to auscultation - no rales, rhonchi or wheezes  CV: regular rate and rhythm, normal S1 S2, no S3 or S4, no murmur, click or rub, no peripheral edema  ABDOMEN: soft, nontender, no hepatosplenomegaly, no masses and bowel sounds normal  MS: no gross musculoskeletal defects noted, no edema  NEURO: Normal strength and tone, mentation intact and speech normal  PSYCH: mentation appears normal, affect normal/bright      Signed Electronically by: Tru Savage MD

## 2024-10-29 ENCOUNTER — ANCILLARY PROCEDURE (OUTPATIENT)
Dept: CT IMAGING | Facility: CLINIC | Age: 34
End: 2024-10-29
Attending: FAMILY MEDICINE
Payer: COMMERCIAL

## 2024-10-29 DIAGNOSIS — R31.0 GROSS HEMATURIA: ICD-10-CM

## 2024-10-29 PROCEDURE — 74178 CT ABD&PLV WO CNTR FLWD CNTR: CPT | Mod: TC | Performed by: RADIOLOGY

## 2024-10-29 RX ORDER — IOPAMIDOL 755 MG/ML
120 INJECTION, SOLUTION INTRAVASCULAR ONCE
Status: COMPLETED | OUTPATIENT
Start: 2024-10-29 | End: 2024-10-29

## 2024-10-29 RX ADMIN — IOPAMIDOL 120 ML: 755 INJECTION, SOLUTION INTRAVASCULAR at 09:50

## 2024-11-23 ENCOUNTER — HEALTH MAINTENANCE LETTER (OUTPATIENT)
Age: 34
End: 2024-11-23

## 2025-06-18 ENCOUNTER — OFFICE VISIT (OUTPATIENT)
Dept: FAMILY MEDICINE | Facility: CLINIC | Age: 35
End: 2025-06-18
Payer: COMMERCIAL

## 2025-06-18 VITALS
HEART RATE: 101 BPM | HEIGHT: 67 IN | SYSTOLIC BLOOD PRESSURE: 118 MMHG | RESPIRATION RATE: 18 BRPM | TEMPERATURE: 98.1 F | WEIGHT: 130 LBS | DIASTOLIC BLOOD PRESSURE: 72 MMHG | BODY MASS INDEX: 20.4 KG/M2 | OXYGEN SATURATION: 98 %

## 2025-06-18 DIAGNOSIS — R10.2 PELVIC PAIN: ICD-10-CM

## 2025-06-18 DIAGNOSIS — Z00.00 ROUTINE GENERAL MEDICAL EXAMINATION AT A HEALTH CARE FACILITY: Primary | ICD-10-CM

## 2025-06-18 DIAGNOSIS — Z30.433 ENCOUNTER FOR REMOVAL AND REINSERTION OF INTRAUTERINE CONTRACEPTIVE DEVICE: ICD-10-CM

## 2025-06-18 PROCEDURE — 58301 REMOVE INTRAUTERINE DEVICE: CPT | Performed by: FAMILY MEDICINE

## 2025-06-18 PROCEDURE — 58300 INSERT INTRAUTERINE DEVICE: CPT | Performed by: FAMILY MEDICINE

## 2025-06-18 PROCEDURE — 3074F SYST BP LT 130 MM HG: CPT | Performed by: FAMILY MEDICINE

## 2025-06-18 PROCEDURE — 1126F AMNT PAIN NOTED NONE PRSNT: CPT | Performed by: FAMILY MEDICINE

## 2025-06-18 PROCEDURE — 3078F DIAST BP <80 MM HG: CPT | Performed by: FAMILY MEDICINE

## 2025-06-18 SDOH — HEALTH STABILITY: PHYSICAL HEALTH: ON AVERAGE, HOW MANY DAYS PER WEEK DO YOU ENGAGE IN MODERATE TO STRENUOUS EXERCISE (LIKE A BRISK WALK)?: 3 DAYS

## 2025-06-18 SDOH — HEALTH STABILITY: PHYSICAL HEALTH: ON AVERAGE, HOW MANY MINUTES DO YOU ENGAGE IN EXERCISE AT THIS LEVEL?: 70 MIN

## 2025-06-18 ASSESSMENT — ANXIETY QUESTIONNAIRES
IF YOU CHECKED OFF ANY PROBLEMS ON THIS QUESTIONNAIRE, HOW DIFFICULT HAVE THESE PROBLEMS MADE IT FOR YOU TO DO YOUR WORK, TAKE CARE OF THINGS AT HOME, OR GET ALONG WITH OTHER PEOPLE: NOT DIFFICULT AT ALL
GAD7 TOTAL SCORE: 0
3. WORRYING TOO MUCH ABOUT DIFFERENT THINGS: NOT AT ALL
7. FEELING AFRAID AS IF SOMETHING AWFUL MIGHT HAPPEN: NOT AT ALL
1. FEELING NERVOUS, ANXIOUS, OR ON EDGE: NOT AT ALL
GAD7 TOTAL SCORE: 0
7. FEELING AFRAID AS IF SOMETHING AWFUL MIGHT HAPPEN: NOT AT ALL
GAD7 TOTAL SCORE: 0
2. NOT BEING ABLE TO STOP OR CONTROL WORRYING: NOT AT ALL
5. BEING SO RESTLESS THAT IT IS HARD TO SIT STILL: NOT AT ALL
6. BECOMING EASILY ANNOYED OR IRRITABLE: NOT AT ALL
8. IF YOU CHECKED OFF ANY PROBLEMS, HOW DIFFICULT HAVE THESE MADE IT FOR YOU TO DO YOUR WORK, TAKE CARE OF THINGS AT HOME, OR GET ALONG WITH OTHER PEOPLE?: NOT DIFFICULT AT ALL
4. TROUBLE RELAXING: NOT AT ALL

## 2025-06-18 ASSESSMENT — PATIENT HEALTH QUESTIONNAIRE - PHQ9
SUM OF ALL RESPONSES TO PHQ QUESTIONS 1-9: 0
10. IF YOU CHECKED OFF ANY PROBLEMS, HOW DIFFICULT HAVE THESE PROBLEMS MADE IT FOR YOU TO DO YOUR WORK, TAKE CARE OF THINGS AT HOME, OR GET ALONG WITH OTHER PEOPLE: NOT DIFFICULT AT ALL
SUM OF ALL RESPONSES TO PHQ QUESTIONS 1-9: 0

## 2025-06-18 ASSESSMENT — SOCIAL DETERMINANTS OF HEALTH (SDOH): HOW OFTEN DO YOU GET TOGETHER WITH FRIENDS OR RELATIVES?: ONCE A WEEK

## 2025-06-18 ASSESSMENT — PAIN SCALES - GENERAL: PAINLEVEL_OUTOF10: NO PAIN (0)

## 2025-06-18 NOTE — PROGRESS NOTES
"Preventive Care Visit  [facility]  Susie Hernandez MD, Family Medicine  Jun 18, 2025      Assessment & Plan     Routine general medical examination at a health care facility    Pelvic pain  Right ovarian cyst  Patient having intermittent pain at the right lower abdomen for 6 months or more that \"takes her breath away\", no other associating factors such as nausea, vomiting, constipation, diarrhea  - US Pelvic Limited; Future    Encounter for removal and reinsertion of intrauterine contraceptive device  See other note in same encounter            Counseling  Appropriate preventive services were addressed with this patient via screening, questionnaire, or discussion as appropriate for fall prevention, nutrition, physical activity, Tobacco-use cessation, social engagement, weight loss and cognition.  Checklist reviewing preventive services available has been given to the patient.  Reviewed patient's diet, addressing concerns and/or questions.   She is at risk for lack of exercise and has been provided with information to increase physical activity for the benefit of her well-being.   The patient was instructed to see the dentist every 6 months.             Trip Mejia is a 35 year old, presenting for the following:  Physical        6/18/2025     8:03 AM   Additional Questions   Roomed by Ben BARRY          Advance Care Planning    Health Care Directive received at today's visit.  Forwarded to [facility].        6/18/2025   General Health   How would you rate your overall physical health? Good   Feel stress (tense, anxious, or unable to sleep) Only a little   (!) STRESS CONCERN      6/18/2025   Nutrition   Three or more servings of calcium each day? Yes   Diet: Regular (no restrictions)   How many servings of fruit and vegetables per day? (!) 0-1   How many sweetened beverages each day? (!) 2         6/18/2025   Exercise   Days per week of moderate/strenous exercise 3 " days   Average minutes spent exercising at this level 70 min         6/18/2025   Social Factors   Frequency of gathering with friends or relatives Once a week   Worry food won't last until get money to buy more No   Food not last or not have enough money for food? No   Do you have housing? (Housing is defined as stable permanent housing and does not include staying outside in a car, in a tent, in an abandoned building, in an overnight shelter, or couch-surfing.) Yes   Are you worried about losing your housing? No   Lack of transportation? No   Unable to get utilities (heat,electricity)? No         6/18/2025   Dental   Dentist two times every year? (!) NO       Today's PHQ-9 Score:       6/18/2025     7:37 AM   PHQ-9 SCORE   PHQ-9 Total Score MyChart 0   PHQ-9 Total Score 0        Patient-reported         6/18/2025   Substance Use   Alcohol more than 3/day or more than 7/wk No   Do you use any other substances recreationally? No     Social History     Tobacco Use    Smoking status: Every Day     Current packs/day: 0.75     Types: Cigarettes    Smokeless tobacco: Never    Tobacco comments:     is trying to quit   Vaping Use    Vaping status: Never Used   Substance Use Topics    Alcohol use: Yes    Drug use: No                  6/18/2025   STI Screening   New sexual partner(s) since last STI/HIV test? (!) YES      History of abnormal Pap smear: YES - reflected in Problem List and Health Maintenance accordingly        Latest Ref Rng & Units 9/29/2023    11:02 AM 11/19/2021     3:04 PM 1/10/2018     5:09 PM   PAP / HPV   PAP  Negative for Intraepithelial Lesion or Malignancy (NILM)  Negative for Intraepithelial Lesion or Malignancy (NILM)     PAP (Historical)    NIL    HPV 16 DNA Negative Negative  Positive     HPV 18 DNA Negative Negative  Negative     Other HR HPV Negative Negative  Negative             6/18/2025   Contraception/Family Planning   Questions about contraception or family planning No   What are your  "periods like? (!) IRREGULAR        Reviewed and updated as needed this visit by Provider                    Past Medical History:   Diagnosis Date    Cervical high risk HPV (human papillomavirus) test positive 2021    Chickenpox     Shingles      Past Surgical History:   Procedure Laterality Date    MOUTH SURGERY      wisdom teeth     OB History    Para Term  AB Living   2 2 2 0 0 2   SAB IAB Ectopic Multiple Live Births   0 0 0 0 2      # Outcome Date GA Lbr Alex/2nd Weight Sex Type Anes PTL Lv   2 Term 12/04/15 38w3d / 00:05 3.204 kg (7 lb 1 oz) F Vag-Spont EPI N THEE      Name: Olivia      Apgar1: 8  Apgar5: 9   1 Term 13 40w6d  3.39 kg (7 lb 7.6 oz) M Vag-Spont EPI N THEE      Name: Braulio      Apgar1: 8  Apgar5: 9         Review of Systems  Constitutional, HEENT, cardiovascular, pulmonary, gi and gu systems are negative, except as otherwise noted.     Objective    Exam  /72   Pulse 101   Temp 98.1  F (36.7  C) (Tympanic)   Resp 18   Ht 1.702 m (5' 7\")   Wt 59 kg (130 lb)   LMP  (LMP Unknown)   SpO2 98%   BMI 20.36 kg/m     Estimated body mass index is 20.36 kg/m  as calculated from the following:    Height as of this encounter: 1.702 m (5' 7\").    Weight as of this encounter: 59 kg (130 lb).    Physical Exam  GENERAL: alert and no distress  NECK: no adenopathy, no asymmetry, masses, or scars  RESP: lungs clear to auscultation - no rales, rhonchi or wheezes  CV: regular rate and rhythm, normal S1 S2, no S3 or S4, no murmur, click or rub, no peripheral edema  ABDOMEN: soft, nontender, no hepatosplenomegaly, no masses and bowel sounds normal  MS: no gross musculoskeletal defects noted, no edema        Signed Electronically by: Susie Hernandez MD      "

## 2025-06-18 NOTE — PROGRESS NOTES
SUBJECTIVE:    Is a pregnancy test required: No.  Was a consent obtained?  Yes    Subjective: Jackie Quispe is a 35 year old  presents for IUD and desires Mirena type IUD.  She requests removal of the IUD because the IUD effectiveness has     Patient has been given the opportunity to ask questions about all forms of birth control, including all options appropriate for Jackie Quispe. Discussed that no method of birth control, except abstinence is 100% effective against pregnancy or sexually transmitted infection.     Jackie Quispe understands she may have the IUD removed at any time. IUD should be removed by a health care provider and the current IUD will be removed today.    The entire removal and insertion procedure was reviewed with the patient, including care after placement.    Today's PHQ-2 Score:       2023     7:59 AM   PHQ-2 (  [employer])   Q1: Little interest or pleasure in doing things 0   Q2: Feeling down, depressed or hopeless 0   PHQ-2 Score 0   Q1: Little interest or pleasure in doing things Not at all   Q2: Feeling down, depressed or hopeless Not at all   PHQ-2 Score 0       PROCEDURE:    A speculum exam was performed and the cervix was visualized. The IUD string was visualized. Using ring forceps, the string  was grasped and the IUD removed intact.    Under sterile technique, cervix was visualized with speculum and prepped with Betadine solution swab x 3. The uterus was gently straightened and sounded to 8.0 cm. IUD prepared for placement, and IUD inserted according to 's instructions without difficulty or significant ressitance, and deployed at the fundus. The strings were visualized and trimmed to 2.5 cm from the external os. Speculum removed.  Patient tolerated procedure well.    EBL: minimal    Complications: none      POST PROCEDURE:    Given 's handouts, including when to have IUD removed, list of danger s/sx, side effects and follow up  recommended. Encouraged condom use for prevention of STD. Advised to call for any fever, for prolonged or severe pain or bleeding, abnormal vaginal dischage, or unable to palpate strings. She was advised to use pain medications (ibuprofen) as needed for mild to moderate pain. Advised to follow-up in clinic in 4-6 weeks for IUD string check if unable to find strings or as directed by provider.     Susie Hernandez MD

## 2025-06-18 NOTE — PATIENT INSTRUCTIONS
Patient Education   Preventive Care Advice   This is general advice given by our system to help you stay healthy. However, your care team may have specific advice just for you. Please talk to your care team about your preventive care needs.  Nutrition  Eat 5 or more servings of fruits and vegetables each day.  Try wheat bread, brown rice and whole grain pasta (instead of white bread, rice, and pasta).  Get enough calcium and vitamin D. Check the label on foods and aim for 100% of the RDA (recommended daily allowance).  Lifestyle  Exercise at least 150 minutes each week  (30 minutes a day, 5 days a week).  Do muscle strengthening activities 2 days a week. These help control your weight and prevent disease.  No smoking.  Wear sunscreen to prevent skin cancer.  Have a dental exam and cleaning every 6 months.  Yearly exams  See your health care team every year to talk about:  Any changes in your health.  Any medicines your care team has prescribed.  Preventive care, family planning, and ways to prevent chronic diseases.  Shots (vaccines)   HPV shots (up to age 26), if you've never had them before.  Hepatitis B shots (up to age 59), if you've never had them before.  COVID-19 shot: Get this shot when it's due.  Flu shot: Get a flu shot every year.  Tetanus shot: Get a tetanus shot every 10 years.  Pneumococcal, hepatitis A, and RSV shots: Ask your care team if you need these based on your risk.  Shingles shot (for age 50 and up)  General health tests  Diabetes screening:  Starting at age 35, Get screened for diabetes at least every 3 years.  If you are younger than age 35, ask your care team if you should be screened for diabetes.  Cholesterol test: At age 39, start having a cholesterol test every 5 years, or more often if advised.  Bone density scan (DEXA): At age 50, ask your care team if you should have this scan for osteoporosis (brittle bones).  Hepatitis C: Get tested at least once in your life.  STIs (sexually  transmitted infections)  Before age 24: Ask your care team if you should be screened for STIs.  After age 24: Get screened for STIs if you're at risk. You are at risk for STIs (including HIV) if:  You are sexually active with more than one person.  You don't use condoms every time.  You or a partner was diagnosed with a sexually transmitted infection.  If you are at risk for HIV, ask about PrEP medicine to prevent HIV.  Get tested for HIV at least once in your life, whether you are at risk for HIV or not.  Cancer screening tests  Cervical cancer screening: If you have a cervix, begin getting regular cervical cancer screening tests starting at age 21.  Breast cancer scan (mammogram): If you've ever had breasts, begin having regular mammograms starting at age 40. This is a scan to check for breast cancer.  Colon cancer screening: It is important to start screening for colon cancer at age 45.  Have a colonoscopy test every 10 years (or more often if you're at risk) Or, ask your provider about stool tests like a FIT test every year or Cologuard test every 3 years.  To learn more about your testing options, visit:   .  For help making a decision, visit:   https://bit.ly/nn75588.  Prostate cancer screening test: If you have a prostate, ask your care team if a prostate cancer screening test (PSA) at age 55 is right for you.  Lung cancer screening: If you are a current or former smoker ages 50 to 80, ask your care team if ongoing lung cancer screenings are right for you.  For informational purposes only. Not to replace the advice of your health care provider. Copyright   2023 [facility]. All rights reserved. Clinically reviewed by the [facility] Transitions [facility]. [employer] [id number] - REV 01/24.  Safer Sex: Care Instructions  Overview  Safer sex is a way to reduce your risk of getting a sexually transmitted infection (STI). It can also help prevent pregnancy.  Several products can help you practice  safer sex and reduce your chance of STIs. One of the best is a condom. There are internal and external condoms. You can use a special rubber sheet (dental dam) for protection during oral sex. Disposable gloves can keep your hands from touching blood, semen, or other body fluids that can carry infections.  Remember that birth control methods such as diaphragms, IUDs, foams, and birth control pills do not stop you from getting STIs.  Follow-up care is a key part of your treatment and safety. Be sure to make and go to all appointments, and call your doctor if you are having problems. It's also a good idea to know your test results and keep a list of the medicines you take.  How can you care for yourself at home?  Think about getting vaccinated to help prevent hepatitis A, hepatitis B, and human papillomavirus (HPV). They can be spread through sex.  Use a condom every time you have sex. Use an external condom, which goes on the penis. Or use an internal condom, which goes into the vagina or anus.  Make sure you use the right size external condom. A condom that's too small can break easily. A condom that's too big can slip off during sex.  Use a new condom each time you have sex. Be careful not to poke a hole in the condom when you open the wrapper.  Don't use an internal condom and an external condom at the same time.  Never use petroleum jelly (such as Vaseline), grease, hand lotion, baby oil, or anything with oil in it. These products can make holes in the condom.  After intercourse, hold the edge of the condom as you remove it. This will help keep semen from spilling out of the condom.  Do not have sex with anyone who has symptoms of an STI, such as sores on the genitals or mouth.  Do not drink a lot of alcohol or use drugs before sex.  Limit your sex partners. Sex with one partner who has sex only with you can reduce your risk of getting an STI.  Don't share sex toys. But if you do share them, use a condom and clean  "the sex toys between each use.  Talk to any partners before you have sex. Talk about what you feel comfortable with and whether you have any boundaries with sex. And find out if your partner or partners may be at risk for any STI. Keep in mind that a person may be able to spread an STI even if they do not have symptoms. You and any partners may want to get tested for STIs.  Where can you learn more?  Go to https://www.[employer].net/patiented  Enter B608 in the search box to learn more about \"Safer Sex: Care Instructions.\"  Current as of: April 30, 2024  Content Version: 14.5    5706-2836 [employer].   Care instructions adapted under license by your healthcare professional. If you have questions about a medical condition or this instruction, always ask your healthcare professional. [employer] disclaims any warranty or liability for your use of this information.       "